# Patient Record
Sex: MALE | Race: WHITE | NOT HISPANIC OR LATINO | Employment: FULL TIME | URBAN - NONMETROPOLITAN AREA
[De-identification: names, ages, dates, MRNs, and addresses within clinical notes are randomized per-mention and may not be internally consistent; named-entity substitution may affect disease eponyms.]

---

## 2017-03-16 ENCOUNTER — CONVERSION ENCOUNTER (OUTPATIENT)
Dept: GASTROENTEROLOGY | Facility: CLINIC | Age: 47
End: 2017-03-16

## 2017-03-17 LAB
ALBUMIN SERPL-MCNC: 4.7 G/DL (ref 3.5–5)
ALBUMIN/GLOB SERPL: 1.7 G/DL (ref 1–2)
ALP SERPL-CCNC: 71 U/L (ref 38–126)
ALT SERPL-CCNC: 51 U/L (ref 13–69)
AMBIG ABBREV CMP14 DEFAULT: NORMAL
AMBIG ABBREV LP DEFAULT: NORMAL
AST SERPL-CCNC: 29 U/L (ref 15–46)
BASOPHILS # BLD AUTO: 0.03 10*3/MM3 (ref 0–0.2)
BASOPHILS NFR BLD AUTO: 0.4 % (ref 0–2.5)
BILIRUB SERPL-MCNC: 0.9 MG/DL (ref 0.2–1.3)
BUN SERPL-MCNC: 16 MG/DL (ref 7–20)
BUN/CREAT SERPL: 14.5 (ref 6.3–21.9)
CALCIUM SERPL-MCNC: 9.7 MG/DL (ref 8.4–10.2)
CHLORIDE SERPL-SCNC: 106 MMOL/L (ref 98–107)
CHOLEST SERPL-MCNC: 243 MG/DL (ref 0–199)
CO2 SERPL-SCNC: 30 MMOL/L (ref 26–30)
CREAT SERPL-MCNC: 1.1 MG/DL (ref 0.6–1.3)
EOSINOPHIL # BLD AUTO: 0.1 10*3/MM3 (ref 0–0.7)
EOSINOPHIL NFR BLD AUTO: 1.3 % (ref 0–7)
ERYTHROCYTE [DISTWIDTH] IN BLOOD BY AUTOMATED COUNT: 12.7 % (ref 11.5–14.5)
GLOBULIN SER CALC-MCNC: 2.7 GM/DL
GLUCOSE SERPL-MCNC: 109 MG/DL (ref 74–98)
HBA1C MFR BLD: 5.7 %
HCT VFR BLD AUTO: 48.3 % (ref 42–52)
HDLC SERPL-MCNC: 40 MG/DL (ref 40–60)
HGB BLD-MCNC: 15.7 G/DL (ref 14–18)
IMM GRANULOCYTES # BLD: 0.03 10*3/MM3 (ref 0–0.06)
IMM GRANULOCYTES NFR BLD: 0.4 % (ref 0–0.6)
LDLC SERPL CALC-MCNC: 178 MG/DL (ref 0–99)
LYMPHOCYTES # BLD AUTO: 1.34 10*3/MM3 (ref 0.6–3.4)
LYMPHOCYTES NFR BLD AUTO: 16.8 % (ref 10–50)
MCH RBC QN AUTO: 29.3 PG (ref 27–31)
MCHC RBC AUTO-ENTMCNC: 32.5 G/DL (ref 30–37)
MCV RBC AUTO: 90.1 FL (ref 80–94)
MONOCYTES # BLD AUTO: 0.9 10*3/MM3 (ref 0–0.9)
MONOCYTES NFR BLD AUTO: 11.3 % (ref 0–12)
NEUTROPHILS # BLD AUTO: 5.57 10*3/MM3 (ref 2–6.9)
NEUTROPHILS NFR BLD AUTO: 69.8 % (ref 37–80)
NRBC BLD AUTO-RTO: 0 /100 WBC (ref 0–0)
PLATELET # BLD AUTO: 218 10*3/MM3 (ref 130–400)
POTASSIUM SERPL-SCNC: 4.8 MMOL/L (ref 3.5–5.1)
PROT SERPL-MCNC: 7.4 G/DL (ref 6.3–8.2)
RBC # BLD AUTO: 5.36 10*6/MM3 (ref 4.7–6.1)
SODIUM SERPL-SCNC: 145 MMOL/L (ref 137–145)
TRIGL SERPL-MCNC: 126 MG/DL
TSH SERPL DL<=0.005 MIU/L-ACNC: 1.63 MIU/ML (ref 0.47–4.68)
VLDLC SERPL CALC-MCNC: 25.2 MG/DL
WBC # BLD AUTO: 7.97 10*3/MM3 (ref 4.8–10.8)

## 2017-03-20 PROBLEM — M26.609 TEMPOROMANDIBULAR JOINT DISORDER: Status: ACTIVE | Noted: 2017-03-20

## 2017-03-22 ENCOUNTER — OFFICE VISIT (OUTPATIENT)
Dept: FAMILY MEDICINE CLINIC | Facility: CLINIC | Age: 47
End: 2017-03-22

## 2017-03-22 VITALS
BODY MASS INDEX: 39.82 KG/M2 | OXYGEN SATURATION: 98 % | RESPIRATION RATE: 17 BRPM | TEMPERATURE: 98 F | HEIGHT: 72 IN | SYSTOLIC BLOOD PRESSURE: 119 MMHG | DIASTOLIC BLOOD PRESSURE: 72 MMHG | HEART RATE: 70 BPM | WEIGHT: 294 LBS

## 2017-03-22 DIAGNOSIS — G47.39 OTHER SLEEP APNEA: ICD-10-CM

## 2017-03-22 DIAGNOSIS — R53.82 CHRONIC FATIGUE: ICD-10-CM

## 2017-03-22 DIAGNOSIS — R73.03 BORDERLINE DIABETES: ICD-10-CM

## 2017-03-22 DIAGNOSIS — G47.09 OTHER INSOMNIA: ICD-10-CM

## 2017-03-22 DIAGNOSIS — F43.9 STRESS AT HOME: Primary | ICD-10-CM

## 2017-03-22 PROCEDURE — 99214 OFFICE O/P EST MOD 30 MIN: CPT | Performed by: INTERNAL MEDICINE

## 2017-03-22 RX ORDER — AMITRIPTYLINE HYDROCHLORIDE 10 MG/1
10 TABLET, FILM COATED ORAL NIGHTLY
Qty: 30 TABLET | Refills: 2 | Status: SHIPPED | OUTPATIENT
Start: 2017-03-22 | End: 2019-09-16

## 2017-03-22 RX ORDER — METFORMIN HYDROCHLORIDE 500 MG/1
TABLET, EXTENDED RELEASE ORAL
COMMUNITY
Start: 2016-02-04 | End: 2017-03-22

## 2017-03-22 NOTE — PROGRESS NOTES
Chief Complaint   Patient presents with   • Establish Care     Patient is here to establish care today.    • Fatigue     Patient states he just has chronic fatigue. When he comes home from work he just wants to crash.    • Weight Gain     Patient states he has an issue losing weight.        Subjective     History of Present Illness   Harish Anaya is a 46 y.o. male with history of borderline DM and obesity who presents to transfer care.  Patient had formerly been following with Dr. Washington and had been started on on metformin after his A1C was noted to be 6.4. He has since stopped metformin and had been controlling diet.     Typical meals: Egg in AM, Salad for lunch, meats for dinner.    He has tried to lose weight by walking regularly but notes that despite walking 3-5 miles per day on a regular basis he is unable to lose weight.  He does express he has stress associated with family life, work at [employer], and as a .      Sleep is a problem. His wife notes that he snores loud.  Patient admits to waking up fatigued.  He also has difficulty falling asleep due to thoughts which wont let him relax.    The following portions of the patient's history were reviewed and updated as appropriate: allergies, current medications, past family history, past medical history, past social history, past surgical history and problem list.    Review of Systems   Constitutional: Positive for fatigue. Negative for chills and fever.   HENT: Negative for congestion, ear pain, rhinorrhea, sinus pressure and sore throat.    Eyes: Negative for visual disturbance.   Respiratory: Negative for cough, chest tightness, shortness of breath and wheezing.    Cardiovascular: Negative for chest pain, palpitations and leg swelling.   Gastrointestinal: Negative for abdominal pain, blood in stool, constipation, diarrhea, nausea and vomiting.   Endocrine: Negative for polydipsia and polyuria.   Genitourinary: Negative for dysuria and hematuria.  "  Musculoskeletal: Negative for back pain.   Skin: Negative for rash.   Neurological: Negative for dizziness, light-headedness, numbness and headaches.   Psychiatric/Behavioral: Positive for sleep disturbance. Negative for dysphoric mood. The patient is not nervous/anxious.        Allergies   Allergen Reactions   • Other        Past Medical History:   Diagnosis Date   • Allergic rhinitis    • BMI 38.0-38.9,adult    • Diverticulosis of colon without hemorrhage    • Dizziness    • Heartburn    • Malaise and fatigue    • Polyuria    • Sore throat        Social History     Social History   • Marital status:      Spouse name: N/A   • Number of children: N/A   • Years of education: N/A     Occupational History   • Not on file.     Social History Main Topics   • Smoking status: Former Smoker   • Smokeless tobacco: Not on file   • Alcohol use No   • Drug use: No   • Sexual activity: Not on file     Other Topics Concern   • Not on file     Social History Narrative   • No narrative on file        Past Surgical History:   Procedure Laterality Date   • APPENDECTOMY     • CHOLECYSTECTOMY     • COLONOSCOPY      complete    • ELBOW PROCEDURE         Family History   Problem Relation Age of Onset   • Depression Mother    • Cancer Maternal Grandmother    • Melanoma Maternal Grandmother    • Parkinsonism Maternal Grandfather    • Diabetes Maternal Aunt    • Alcohol abuse Maternal Uncle    • Liver disease Maternal Uncle          Current Outpatient Prescriptions:   •  amitriptyline (ELAVIL) 10 MG tablet, Take 1 tablet by mouth Every Night., Disp: 30 tablet, Rfl: 2    Objective   /72 (BP Location: Right arm, Patient Position: Sitting, Cuff Size: Adult)  Pulse 70  Temp 98 °F (36.7 °C) (Oral)   Resp 17  Ht 72\" (182.9 cm)  Wt 294 lb (133 kg)  SpO2 98%  BMI 39.87 kg/m2    Physical Exam   Constitutional: He is oriented to person, place, and time. He appears well-nourished. No distress.   HENT:   Head: Atraumatic.   Right " Ear: External ear normal.   Left Ear: External ear normal.   Eyes: Conjunctivae are normal. Right eye exhibits no discharge. Left eye exhibits no discharge.   Cardiovascular: Normal rate and regular rhythm.    No murmur heard.  Pulmonary/Chest: Effort normal and breath sounds normal. He has no wheezes. He has no rales.   Abdominal: Soft. Bowel sounds are normal. He exhibits no distension. There is no tenderness.   Neurological: He is alert and oriented to person, place, and time.   Skin: No rash noted. He is not diaphoretic.   Psychiatric: He has a normal mood and affect.   Nursing note and vitals reviewed.      Assessment/Plan   Harish was seen today for establish care, fatigue and weight gain.    Diagnoses and all orders for this visit:    Stress at home    Other sleep apnea  -     Ambulatory Referral to Sleep Medicine    Chronic fatigue    Other insomnia  -     amitriptyline (ELAVIL) 10 MG tablet; Take 1 tablet by mouth Every Night.    Borderline diabetes          Discussion Summary:  47 yo W M presenting for follow up.    1. Borderline Diabetes  - hold metformin, continue diet control, A1C was 5.5 while off meds recently.      2. Morbid obesity  - diet control seems ineffective per patient  - cardio exercises encouraged  - appears to be associated with stress and CONRAD    3. Sleep apnea  - sleep referral placed, pt to see Dr. Rosales    4. Abnormal Polyps  - Pt to see Dr. Lara for Colonoscopy, needs follow up after 3 years which is this May.    5. Insomnia  - start low dose amitryptiline.    6. HCM - address at later visit.      Follow up:  Return in about 1 month (around 4/22/2017) for Next scheduled follow up.     Patient Instructions:  Patient instructions were provided.

## 2017-03-22 NOTE — PATIENT INSTRUCTIONS
Diabetes Mellitus and Food  It is important for you to manage your blood sugar (glucose) level. Your blood glucose level can be greatly affected by what you eat. Eating healthier foods in the appropriate amounts throughout the day at about the same time each day will help you control your blood glucose level. It can also help slow or prevent worsening of your diabetes mellitus. Healthy eating may even help you improve the level of your blood pressure and reach or maintain a healthy weight.   General recommendations for healthful eating and cooking habits include:  · Eating meals and snacks regularly. Avoid going long periods of time without eating to lose weight.  · Eating a diet that consists mainly of plant-based foods, such as fruits, vegetables, nuts, legumes, and whole grains.  · Using low-heat cooking methods, such as baking, instead of high-heat cooking methods, such as deep frying.  Work with your dietitian to make sure you understand how to use the Nutrition Facts information on food labels.  HOW CAN FOOD AFFECT ME?  Carbohydrates  Carbohydrates affect your blood glucose level more than any other type of food. Your dietitian will help you determine how many carbohydrates to eat at each meal and teach you how to count carbohydrates. Counting carbohydrates is important to keep your blood glucose at a healthy level, especially if you are using insulin or taking certain medicines for diabetes mellitus.  Alcohol  Alcohol can cause sudden decreases in blood glucose (hypoglycemia), especially if you use insulin or take certain medicines for diabetes mellitus. Hypoglycemia can be a life-threatening condition. Symptoms of hypoglycemia (sleepiness, dizziness, and disorientation) are similar to symptoms of having too much alcohol.   If your health care provider has given you approval to drink alcohol, do so in moderation and use the following guidelines:  · Women should not have more than one drink per day, and men  should not have more than two drinks per day. One drink is equal to:    12 oz of beer.    5 oz of wine.    1½ oz of hard liquor.  · Do not drink on an empty stomach.  · Keep yourself hydrated. Have water, diet soda, or unsweetened iced tea.  · Regular soda, juice, and other mixers might contain a lot of carbohydrates and should be counted.  WHAT FOODS ARE NOT RECOMMENDED?  As you make food choices, it is important to remember that all foods are not the same. Some foods have fewer nutrients per serving than other foods, even though they might have the same number of calories or carbohydrates. It is difficult to get your body what it needs when you eat foods with fewer nutrients. Examples of foods that you should avoid that are high in calories and carbohydrates but low in nutrients include:  · Trans fats (most processed foods list trans fats on the Nutrition Facts label).  · Regular soda.  · Juice.  · Candy.  · Sweets, such as cake, pie, doughnuts, and cookies.  · Fried foods.  WHAT FOODS CAN I EAT?  Eat nutrient-rich foods, which will nourish your body and keep you healthy. The food you should eat also will depend on several factors, including:  · The calories you need.  · The medicines you take.  · Your weight.  · Your blood glucose level.  · Your blood pressure level.  · Your cholesterol level.  You should eat a variety of foods, including:  · Protein.    Lean cuts of meat.    Proteins low in saturated fats, such as fish, egg whites, and beans. Avoid processed meats.  · Fruits and vegetables.    Fruits and vegetables that may help control blood glucose levels, such as apples, mangoes, and yams.  · Dairy products.    Choose fat-free or low-fat dairy products, such as milk, yogurt, and cheese.  · Grains, bread, pasta, and rice.    Choose whole grain products, such as multigrain bread, whole oats, and brown rice. These foods may help control blood pressure.  · Fats.    Foods containing healthful fats, such as nuts,  avocado, olive oil, canola oil, and fish.  DOES EVERYONE WITH DIABETES MELLITUS HAVE THE SAME MEAL PLAN?  Because every person with diabetes mellitus is different, there is not one meal plan that works for everyone. It is very important that you meet with a dietitian who will help you create a meal plan that is just right for you.     This information is not intended to replace advice given to you by your health care provider. Make sure you discuss any questions you have with your health care provider.     Document Released: 09/14/2006 Document Revised: 01/08/2016 Document Reviewed: 11/14/2014  [employer] Interactive Patient Education ©2016 [employer]

## 2017-03-23 DIAGNOSIS — E78.49 OTHER HYPERLIPIDEMIA: Primary | ICD-10-CM

## 2017-03-23 RX ORDER — ATORVASTATIN CALCIUM 20 MG/1
20 TABLET, FILM COATED ORAL DAILY
Qty: 30 TABLET | Refills: 3 | Status: SHIPPED | OUTPATIENT
Start: 2017-03-23 | End: 2020-01-14

## 2017-03-24 ENCOUNTER — TELEPHONE (OUTPATIENT)
Dept: FAMILY MEDICINE CLINIC | Facility: CLINIC | Age: 47
End: 2017-03-24

## 2017-03-24 NOTE — TELEPHONE ENCOUNTER
----- Message from Matti Rivera DO sent at 3/23/2017 10:52 PM EDT -----  Contact: SARA MOSQUEDA called it in.  ----- Message -----     From: Delmis Ruffin MA     Sent: 3/23/2017   4:08 PM       To: Matti Rivera DO    WIFE CAME BY OFFICE STATING THAT YOU HAD DISCUSSED PUTTING FRAN ON A LOW DOSE STATIN BUT NOTHING WAS SENT TO THE PHARMACY.

## 2017-11-10 DIAGNOSIS — R10.32 LEFT LOWER QUADRANT PAIN: Primary | ICD-10-CM

## 2017-11-10 RX ORDER — CIPROFLOXACIN 500 MG/1
500 TABLET, FILM COATED ORAL 2 TIMES DAILY
Qty: 14 TABLET | Refills: 0 | Status: SHIPPED | OUTPATIENT
Start: 2017-11-10 | End: 2017-11-17

## 2017-11-10 RX ORDER — METRONIDAZOLE 250 MG/1
250 TABLET ORAL 4 TIMES DAILY
Qty: 28 TABLET | Refills: 0 | Status: SHIPPED | OUTPATIENT
Start: 2017-11-10 | End: 2017-11-17

## 2019-09-16 ENCOUNTER — OFFICE VISIT (OUTPATIENT)
Dept: GASTROENTEROLOGY | Facility: CLINIC | Age: 49
End: 2019-09-16

## 2019-09-16 VITALS
SYSTOLIC BLOOD PRESSURE: 126 MMHG | TEMPERATURE: 98.5 F | WEIGHT: 299 LBS | HEART RATE: 105 BPM | BODY MASS INDEX: 40.5 KG/M2 | RESPIRATION RATE: 18 BRPM | HEIGHT: 72 IN | DIASTOLIC BLOOD PRESSURE: 87 MMHG

## 2019-09-16 DIAGNOSIS — R10.30 LOWER ABDOMINAL PAIN: Primary | ICD-10-CM

## 2019-09-16 DIAGNOSIS — R13.10 DYSPHAGIA, UNSPECIFIED TYPE: ICD-10-CM

## 2019-09-16 DIAGNOSIS — R10.32 LEFT LOWER QUADRANT PAIN: Primary | ICD-10-CM

## 2019-09-16 DIAGNOSIS — R12 HEARTBURN: ICD-10-CM

## 2019-09-16 DIAGNOSIS — K59.00 CONSTIPATION, UNSPECIFIED CONSTIPATION TYPE: ICD-10-CM

## 2019-09-16 PROCEDURE — 99204 OFFICE O/P NEW MOD 45 MIN: CPT | Performed by: INTERNAL MEDICINE

## 2019-09-16 RX ORDER — METRONIDAZOLE 250 MG/1
250 TABLET ORAL 4 TIMES DAILY
Qty: 28 TABLET | Refills: 0 | Status: SHIPPED | OUTPATIENT
Start: 2019-09-16 | End: 2020-01-10

## 2019-09-16 RX ORDER — CIPROFLOXACIN 500 MG/1
500 TABLET, FILM COATED ORAL 2 TIMES DAILY
Qty: 14 TABLET | Refills: 0 | Status: SHIPPED | OUTPATIENT
Start: 2019-09-16 | End: 2020-01-10

## 2019-09-16 NOTE — PROGRESS NOTES
Chief Complaint   Patient presents with   • Abdominal Pain     History of Present Illness     There is history of bilateral lower quadrant abdominal pain for the last 2 days.  The patient started having lower abdominal pain on Saturday night.  The pain is gradual in onset, moderate to severe in severity and aching in character.  Frequency being 2-3 times a day.  The bad pains may last for several minutes.  There is no radiation of abdominal pain.  Eating worsens the abdominal pain.  No definite relieving factors of abdominal pain.  There is associated fever.  Yesterday the patient was running a temperature of 99.5.  However today her temperature was 100.2.  He denies chills.  The patient has been constipated for the last 2 days.  The patient has however been passing flatus.  The patient has history of diverticulitis in the past.  His last colonoscopy was in 2014.  The patient has colon polyps.    The patient has a history of reflux off and on for the last several years.  The reflux is moderately severe.  Symptoms are described as retrosternal burning sensation, and indigestion.  There is history of occasional regurgitative symptoms.  Frequency being several times per week.  The symptoms are worse at night.  Currently the patient is not taking acid suppressive therapy.  The patient has intermittent mild solid food dysphagia at times.  This is not progressive.    The patient denies nausea or vomiting.  There is no history of recent weight loss.  There is no history of overt GI bleed (Hematemesis, melena or hematochezia).  The patient has history of anemia.  There is no history of liver or pancreatic disease.       Review of Systems   Constitutional: Positive for fatigue and fever. Negative for appetite change, chills and unexpected weight change.   HENT: Positive for trouble swallowing. Negative for mouth sores and nosebleeds.    Eyes: Negative for discharge and redness.   Respiratory: Positive for apnea. Negative for  cough and shortness of breath.    Cardiovascular: Negative for chest pain, palpitations and leg swelling.   Gastrointestinal: Positive for abdominal pain and constipation. Negative for abdominal distention, anal bleeding, blood in stool, diarrhea, nausea and vomiting.   Endocrine: Negative for cold intolerance, heat intolerance and polydipsia.   Genitourinary: Negative for dysuria, hematuria and urgency.   Musculoskeletal: Negative for arthralgias, joint swelling and myalgias.   Skin: Negative for rash.   Allergic/Immunologic: Negative for food allergies and immunocompromised state.   Neurological: Positive for weakness. Negative for dizziness, seizures, syncope and headaches.   Hematological: Negative for adenopathy. Does not bruise/bleed easily.   Psychiatric/Behavioral: Negative for dysphoric mood. The patient is not nervous/anxious and is not hyperactive.      Patient Active Problem List   Diagnosis   • Temporomandibular joint disorder   • Borderline diabetes   • Chronic fatigue     Past Medical History:   Diagnosis Date   • Allergic rhinitis    • BMI 38.0-38.9,adult    • Diverticulosis of colon without hemorrhage    • Dizziness    • Heartburn    • Malaise and fatigue    • Polyuria    • Sore throat      Past Surgical History:   Procedure Laterality Date   • APPENDECTOMY     • CHOLECYSTECTOMY     • COLONOSCOPY      complete    • ELBOW PROCEDURE       Family History   Problem Relation Age of Onset   • Depression Mother    • Cancer Maternal Grandmother    • Melanoma Maternal Grandmother    • Parkinsonism Maternal Grandfather    • Diabetes Maternal Aunt    • Alcohol abuse Maternal Uncle    • Liver disease Maternal Uncle      Social History     Tobacco Use   • Smoking status: Former Smoker   Substance Use Topics   • Alcohol use: No       Current Outpatient Medications:   •  atorvastatin (LIPITOR) 20 MG tablet, Take 1 tablet by mouth Daily., Disp: 30 tablet, Rfl: 3  •  ciprofloxacin (CIPRO) 500 MG tablet, Take 1  "tablet by mouth 2 (Two) Times a Day., Disp: 14 tablet, Rfl: 0  •  metroNIDAZOLE (FLAGYL) 250 MG tablet, Take 1 tablet by mouth 4 (Four) Times a Day., Disp: 28 tablet, Rfl: 0  •  Probiotic capsule, Take 1 capsule by mouth Daily., Disp: 30 capsule, Rfl: 1    Allergies   Allergen Reactions   • Other Unknown (See Comments)     NONE REPORTED ON 09.16.19       Blood pressure 126/87, pulse 105, temperature 98.5 °F (36.9 °C), resp. rate 18, height 182.9 cm (72\"), weight 136 kg (299 lb).    Physical Exam   Constitutional: He is oriented to person, place, and time. He appears well-developed and well-nourished. No distress.   HENT:   Head: Normocephalic and atraumatic.   Right Ear: Hearing and external ear normal.   Left Ear: Hearing and external ear normal.   Nose: Nose normal.   Mouth/Throat: Oropharynx is clear and moist and mucous membranes are normal. Mucous membranes are not pale, not dry and not cyanotic. No oral lesions. No oropharyngeal exudate.   Multiple skin tags around neck   Eyes: Conjunctivae and EOM are normal. Right eye exhibits no discharge. Left eye exhibits no discharge. No scleral icterus.   Neck: Trachea normal. Neck supple. No JVD present. No edema present. No thyroid mass and no thyromegaly present.   Cardiovascular: Normal rate, regular rhythm, S2 normal and normal heart sounds. Exam reveals no gallop, no S3 and no friction rub.   No murmur heard.  Pulmonary/Chest: Effort normal and breath sounds normal. No respiratory distress. He has no wheezes. He has no rales. He exhibits no tenderness.   Abdominal: Soft. Normal appearance and bowel sounds are normal. He exhibits no distension, no ascites and no mass. There is no splenomegaly or hepatomegaly. There is tenderness. There is no rigidity, no rebound and no guarding. No hernia.   Musculoskeletal: He exhibits no tenderness or deformity.     Vascular Status -  His right foot exhibits no edema. His left foot exhibits no edema.  Lymphadenopathy:     He has " no cervical adenopathy.        Left: No supraclavicular adenopathy present.   Neurological: He is alert and oriented to person, place, and time. He has normal strength. No cranial nerve deficit or sensory deficit. He exhibits normal muscle tone. Coordination normal.   Skin: No rash noted. He is not diaphoretic. No cyanosis. No pallor. Nails show no clubbing.   Psychiatric: He has a normal mood and affect. His behavior is normal. Judgment and thought content normal.   Nursing note and vitals reviewed.  Stigmata of chronic liver disease:  None.  Asterixis:  None.    Laboratory Testing:  Upon review of medical records:    Dated March 16, 2017 sodium 145 potassium 4.8 chloride 106 CO2 30 BUN 16 serum creatinine 1.10 glucose 109.  Calcium 9.7.  Total protein 7.4.  Albumin 4.70.  T bili 0.9 AST 29 ALT 51 alkaline phosphatase 71.  Total cholesterol 243.  Triglycerides 126.  TSH 1.630.  Hemoglobin A1c 5.70%.  WBC 7.97 hemoglobin 15.7 hematocrit 48.3 MCV 90.1 and platelet count 218.     Procedures:   Upon review of records:     Dated May 14, 2014 the patient underwent a colonoscopy to the terminal ileum which revealed: Left-sided diverticulosis, with an area of significant erythema and swelling in the left colon consistent with underlying diverticulitis, colon polyps, inverted diverticula, rectal nodule, and internal hemorrhoids.  Mucosa was noted to be somewhat flattened within the terminal ileum peer small bowel, terminal ileum, biopsy revealed no pathologic alterations, negative for celiac disease.  Random colon biopsy revealed no significant pathologic alterations.  Sigmoid colon polyp, biopsy revealed sessile serrated adenoma's without high-grade dysplasia (x2).  Sigmoid colon polyp, biopsy revealed inflammatory polyps (x2).  Rectum, biopsy revealed mucosal lymphoid aggregates (x4).    Dated May 14, 2014 the patient underwent an upper endoscopy which revealed: Severe erosive distal esophagitis grade 4, free  "gastroesophageal reflux, distal esophageal diverticulum (small), early stricturing of the distal esophagus, polypoid areas at the cardia, sliding hiatal hernia (less than 3 cm), and erythematous gastritis.  A possible underlying Pierce's cannot be excluded.  No scalloping was seen within the second portion of duodenum.  A significant amount of bile was noted within the stomach and this was suctioned.  Second portion of duodenum, biopsy revealed no pathologic alterations.  Antrum and body, biopsy revealed no pathologic alterations.  No H. pylori identified (negative CFV stain).  Stomach, cardia, biopsy revealed chronic active carditis.  No H. pylori identified (negative CFV stain, adequate control).  Negative for metaplasia, dysplasia, or malignancy.  Mid base, esophagus, left lateral, biopsy revealed reflux esophagitis.  No metaplasia identified (negative AB/PAS stain, adequate control).      Assessment:      ICD-10-CM ICD-9-CM   1. Lower abdominal pain R10.30 789.09   2. Constipation, unspecified constipation type K59.00 564.00   3. Heartburn R12 787.1   4. Dysphagia, unspecified type R13.10 787.20         Discussion:  1.     Plan/  Patient Instructions   1. Clear liquid diet until tomorrow morning.  2. If the patient feels better the diet may then be advanced to low fiber-low-fat diet, otherwise continue with clear liquids for another 24 hours.  3. Once the solid food is resumed eat low fiber diet (avoid fruits, vegetables, cereals, fiber supplements, nuts and seeds) for 5 days thereafter low-fat high-fiber diet.  4. Cipro (ciprofloxacin) 500 mg tablets.  Take 1 tablet twice a day for 7 days.  Side effects were discussed.  5. Flagyl (metronidazole) tablets 250 mg. Take 1 tablet one by mouth 4 times a day for 7 days.  Side effects were discussed.  6. Avoid laxatives, enemas for next 5 days.  However, for constipation the patient may use \"stool softeners\" 1-2 per day.  7. May take probiotics such as Align.  Take " one orally daily while taking antibiotics and 1-2 weeks thereafter.  8. The patient may call back tomorrow and day after tomorrow regarding progress.  9. A possible colonoscopy is recommended in 3 to 4 weeks.  The patient has history of colon polyps.  His last colonoscopy was in 2014.  10. The patient may take Tylenol 500 to 650 mg orally 4 times a day for fever.  11. Should the symptoms get worse the patient has been advised to call back or to seek medical help in the emergency room.  12. Recommend acid suppressive therapy.           Ruddy Lara MD

## 2019-09-16 NOTE — PATIENT INSTRUCTIONS
"1. Clear liquid diet until tomorrow morning.  2. If the patient feels better the diet may then be advanced to low fiber-low-fat diet, otherwise continue with clear liquids for another 24 hours.  3. Once the solid food is resumed eat low fiber diet (avoid fruits, vegetables, cereals, fiber supplements, nuts and seeds) for 5 days thereafter low-fat high-fiber diet.  4. Cipro (ciprofloxacin) 500 mg tablets.  Take 1 tablet twice a day for 7 days.  Side effects were discussed.  5. Flagyl (metronidazole) tablets 250 mg. Take 1 tablet one by mouth 4 times a day for 7 days.  Side effects were discussed.  6. Avoid laxatives, enemas for next 5 days.  However, for constipation the patient may use \"stool softeners\" 1-2 per day.  7. May take probiotics such as Align.  Take one orally daily while taking antibiotics and 1-2 weeks thereafter.  8. The patient may call back tomorrow and day after tomorrow regarding progress.  9. A possible colonoscopy is recommended in 3 to 4 weeks.  The patient has history of colon polyps.  His last colonoscopy was in 2014.  10. The patient may take Tylenol 500 to 650 mg orally 4 times a day for fever.  11. Should the symptoms get worse the patient has been advised to call back or to seek medical help in the emergency room.  12. Recommend acid suppressive therapy.      "

## 2019-12-19 ENCOUNTER — PREP FOR SURGERY (OUTPATIENT)
Dept: OTHER | Facility: HOSPITAL | Age: 49
End: 2019-12-19

## 2019-12-19 DIAGNOSIS — K59.00 CONSTIPATION, UNSPECIFIED CONSTIPATION TYPE: ICD-10-CM

## 2019-12-19 DIAGNOSIS — Z86.010 HISTORY OF COLON POLYPS: ICD-10-CM

## 2019-12-19 DIAGNOSIS — Z12.11 COLON CANCER SCREENING: ICD-10-CM

## 2019-12-19 DIAGNOSIS — R10.30 LOWER ABDOMINAL PAIN: Primary | ICD-10-CM

## 2019-12-19 DIAGNOSIS — R10.32 LEFT LOWER QUADRANT PAIN: ICD-10-CM

## 2019-12-19 DIAGNOSIS — K62.89 RECTAL NODULE: ICD-10-CM

## 2019-12-19 RX ORDER — SODIUM CHLORIDE 9 MG/ML
70 INJECTION, SOLUTION INTRAVENOUS CONTINUOUS PRN
Status: CANCELLED | OUTPATIENT
Start: 2020-01-17

## 2019-12-27 PROBLEM — Z86.012 HISTORY OF BENIGN CARCINOID TUMOR: Status: ACTIVE | Noted: 2019-12-27

## 2019-12-27 PROBLEM — R10.32 LEFT LOWER QUADRANT PAIN: Status: ACTIVE | Noted: 2019-12-27

## 2019-12-27 PROBLEM — K59.00 CONSTIPATION: Status: ACTIVE | Noted: 2019-12-27

## 2019-12-27 PROBLEM — R10.30 LOWER ABDOMINAL PAIN: Status: ACTIVE | Noted: 2019-12-27

## 2019-12-27 PROBLEM — Z12.11 COLON CANCER SCREENING: Status: ACTIVE | Noted: 2019-12-27

## 2020-01-10 ENCOUNTER — HOSPITAL ENCOUNTER (OUTPATIENT)
Dept: GENERAL RADIOLOGY | Facility: HOSPITAL | Age: 50
Discharge: HOME | End: 2020-01-10
Admitting: INTERNAL MEDICINE

## 2020-01-10 ENCOUNTER — OFFICE VISIT (OUTPATIENT)
Dept: INTERNAL MEDICINE | Facility: CLINIC | Age: 50
End: 2020-01-10

## 2020-01-10 VITALS
DIASTOLIC BLOOD PRESSURE: 75 MMHG | BODY MASS INDEX: 42.26 KG/M2 | SYSTOLIC BLOOD PRESSURE: 124 MMHG | OXYGEN SATURATION: 99 % | HEIGHT: 72 IN | HEART RATE: 65 BPM | RESPIRATION RATE: 18 BRPM | WEIGHT: 312 LBS | TEMPERATURE: 97.7 F

## 2020-01-10 DIAGNOSIS — G89.29 CHRONIC PAIN OF RIGHT KNEE: ICD-10-CM

## 2020-01-10 DIAGNOSIS — R53.82 CHRONIC FATIGUE: ICD-10-CM

## 2020-01-10 DIAGNOSIS — M79.10 MUSCLE PAIN: ICD-10-CM

## 2020-01-10 DIAGNOSIS — G47.39 OTHER SLEEP APNEA: ICD-10-CM

## 2020-01-10 DIAGNOSIS — M25.561 CHRONIC PAIN OF RIGHT KNEE: ICD-10-CM

## 2020-01-10 DIAGNOSIS — G47.09 OTHER INSOMNIA: ICD-10-CM

## 2020-01-10 DIAGNOSIS — N52.9 ERECTILE DYSFUNCTION, UNSPECIFIED ERECTILE DYSFUNCTION TYPE: ICD-10-CM

## 2020-01-10 DIAGNOSIS — R73.03 BORDERLINE DIABETES: ICD-10-CM

## 2020-01-10 DIAGNOSIS — R73.03 PREDIABETES: Primary | ICD-10-CM

## 2020-01-10 LAB — HBA1C MFR BLD: 5.8 %

## 2020-01-10 PROCEDURE — 73560 X-RAY EXAM OF KNEE 1 OR 2: CPT

## 2020-01-10 PROCEDURE — 99396 PREV VISIT EST AGE 40-64: CPT | Performed by: INTERNAL MEDICINE

## 2020-01-10 PROCEDURE — 83036 HEMOGLOBIN GLYCOSYLATED A1C: CPT | Performed by: INTERNAL MEDICINE

## 2020-01-10 PROCEDURE — 90715 TDAP VACCINE 7 YRS/> IM: CPT | Performed by: INTERNAL MEDICINE

## 2020-01-10 PROCEDURE — 90471 IMMUNIZATION ADMIN: CPT | Performed by: INTERNAL MEDICINE

## 2020-01-10 RX ORDER — AMITRIPTYLINE HYDROCHLORIDE 10 MG/1
10 TABLET, FILM COATED ORAL NIGHTLY PRN
Qty: 30 TABLET | Refills: 2 | Status: SHIPPED | OUTPATIENT
Start: 2020-01-10 | End: 2020-03-03 | Stop reason: SDUPTHER

## 2020-01-10 NOTE — PATIENT INSTRUCTIONS
Health Maintenance, Male  A healthy lifestyle and preventive care is important for your health and wellness. Ask your health care provider about what schedule of regular examinations is right for you.  What should I know about weight and diet?  Eat a Healthy Diet  · Eat plenty of vegetables, fruits, whole grains, low-fat dairy products, and lean protein.  · Do not eat a lot of foods high in solid fats, added sugars, or salt.    Maintain a Healthy Weight  Regular exercise can help you achieve or maintain a healthy weight. You should:  · Do at least 150 minutes of exercise each week. The exercise should increase your heart rate and make you sweat (moderate-intensity exercise).  · Do strength-training exercises at least twice a week.  Watch Your Levels of Cholesterol and Blood Lipids  · Have your blood tested for lipids and cholesterol every 5 years starting at 35 years of age. If you are at high risk for heart disease, you should start having your blood tested when you are 20 years old. You may need to have your cholesterol levels checked more often if:  ? Your lipid or cholesterol levels are high.  ? You are older than 50 years of age.  ? You are at high risk for heart disease.  What should I know about cancer screening?  Many types of cancers can be detected early and may often be prevented.  Lung Cancer  · You should be screened every year for lung cancer if:  ? You are a current smoker who has smoked for at least 30 years.  ? You are a former smoker who has quit within the past 15 years.  · Talk to your health care provider about your screening options, when you should start screening, and how often you should be screened.  Colorectal Cancer  · Routine colorectal cancer screening usually begins at 50 years of age and should be repeated every 5-10 years until you are 75 years old. You may need to be screened more often if early forms of precancerous polyps or small growths are found. Your health care provider may  recommend screening at an earlier age if you have risk factors for colon cancer.  · Your health care provider may recommend using home test kits to check for hidden blood in the stool.  · A small camera at the end of a tube can be used to examine your colon (sigmoidoscopy or colonoscopy). This checks for the earliest forms of colorectal cancer.  Prostate and Testicular Cancer  · Depending on your age and overall health, your health care provider may do certain tests to screen for prostate and testicular cancer.  · Talk to your health care provider about any symptoms or concerns you have about testicular or prostate cancer.  Skin Cancer  · Check your skin from head to toe regularly.  · Tell your health care provider about any new moles or changes in moles, especially if:  ? There is a change in a mole’s size, shape, or color.  ? You have a mole that is larger than a pencil eraser.  · Always use sunscreen. Apply sunscreen liberally and repeat throughout the day.  · Protect yourself by wearing long sleeves, pants, a wide-brimmed hat, and sunglasses when outside.  What should I know about heart disease, diabetes, and high blood pressure?  · If you are 18-39 years of age, have your blood pressure checked every 3-5 years. If you are 40 years of age or older, have your blood pressure checked every year. You should have your blood pressure measured twice--once when you are at a hospital or clinic, and once when you are not at a hospital or clinic. Record the average of the two measurements. To check your blood pressure when you are not at a hospital or clinic, you can use:  ? An automated blood pressure machine at a pharmacy.  ? A home blood pressure monitor.  · Talk to your health care provider about your target blood pressure.  · If you are between 45-79 years old, ask your health care provider if you should take aspirin to prevent heart disease.  · Have regular diabetes screenings by checking your fasting blood sugar  level.  ? If you are at a normal weight and have a low risk for diabetes, have this test once every three years after the age of 45.  ? If you are overweight and have a high risk for diabetes, consider being tested at a younger age or more often.  · A one-time screening for abdominal aortic aneurysm (AAA) by ultrasound is recommended for men aged 65-75 years who are current or former smokers.  What should I know about preventing infection?  Hepatitis B  If you have a higher risk for hepatitis B, you should be screened for this virus. Talk with your health care provider to find out if you are at risk for hepatitis B infection.  Hepatitis C  Blood testing is recommended for:  · Everyone born from 1945 through 1965.  · Anyone with known risk factors for hepatitis C.  Sexually Transmitted Diseases (STDs)  · You should be screened each year for STDs including gonorrhea and chlamydia if:  ? You are sexually active and are younger than 24 years of age.  ? You are older than 24 years of age and your health care provider tells you that you are at risk for this type of infection.  ? Your sexual activity has changed since you were last screened and you are at an increased risk for chlamydia or gonorrhea. Ask your health care provider if you are at risk.  · Talk with your health care provider about whether you are at high risk of being infected with HIV. Your health care provider may recommend a prescription medicine to help prevent HIV infection.  What else can I do?  · Schedule regular health, dental, and eye exams.  · Stay current with your vaccines (immunizations).  · Do not use any tobacco products, such as cigarettes, chewing tobacco, and e-cigarettes. If you need help quitting, ask your health care provider.  · Limit alcohol intake to no more than 2 drinks per day. One drink equals 12 ounces of beer, 5 ounces of wine, or 1½ ounces of hard liquor.  · Do not use street drugs.  · Do not share needles.  · Ask your health  care provider for help if you need support or information about quitting drugs.  · Tell your health care provider if you often feel depressed.  · Tell your health care provider if you have ever been abused or do not feel safe at home.  This information is not intended to replace advice given to you by your health care provider. Make sure you discuss any questions you have with your health care provider.  Document Released: 06/15/2009 Document Revised: 08/16/2017 Document Reviewed: 09/20/2016  [employer] Interactive Patient Education © 2019 [employer]

## 2020-01-10 NOTE — PROGRESS NOTES
Chief Complaint   Patient presents with   • Annual Exam     has concern for muscle weakness/pain in legs-mainly, knee pain, check sugar-Colonoscopy scheduled next week       Subjective     History of Present Illness   Harish Anaya is a 49 y.o. male presenting for annual physical.  Preventive health maintenance was reviewed and discussed today. Vaccines were updated.     Has been having muscle pains and calf pains with a sensation that his muscles are weak.  Has had moments where he feels weak in the legs.  Almost fell off stairs once.  Admits to having right knee troubles as a result of avoiding pressure on left.     Has colonoscopy planned for next week.     Has been having difficulty with falling asleep, did well with amitriptyline in the past.  Wishes to restart the medications.  Did not establish with sleep medicine for CONRAD noted a couple of years ago.     The following portions of the patient's history were reviewed and updated as appropriate: allergies, current medications, past family history, past medical history, past social history, past surgical history and problem list.    Review of Systems   Constitutional: Negative for chills, fatigue and fever.   HENT: Negative for congestion, ear pain, rhinorrhea, sinus pressure and sore throat.    Eyes: Negative for visual disturbance.   Respiratory: Negative for cough, chest tightness, shortness of breath and wheezing.    Cardiovascular: Negative for chest pain, palpitations and leg swelling.   Gastrointestinal: Negative for abdominal pain, blood in stool, constipation, diarrhea, nausea and vomiting.   Endocrine: Negative for polydipsia and polyuria.   Genitourinary: Negative for dysuria and hematuria.   Musculoskeletal: Negative for arthralgias and back pain.   Skin: Negative for rash.   Neurological: Negative for dizziness, light-headedness, numbness and headaches.   Psychiatric/Behavioral: Negative for dysphoric mood and sleep disturbance. The patient  "is not nervous/anxious.        No Known Allergies    Past Medical History:   Diagnosis Date   • Allergic rhinitis    • BMI 38.0-38.9,adult    • Diabetes mellitus (CMS/HCC)     borderline   • Diverticulosis of colon without hemorrhage    • Dizziness    • Heartburn    • Hyperlipidemia    • Malaise and fatigue    • Polyuria    • PONV (postoperative nausea and vomiting)    • Sore throat        Social History     Socioeconomic History   • Marital status:      Spouse name: Not on file   • Number of children: Not on file   • Years of education: Not on file   • Highest education level: Not on file   Tobacco Use   • Smoking status: Former Smoker   Substance and Sexual Activity   • Alcohol use: No   • Drug use: No   • Sexual activity: Defer        Past Surgical History:   Procedure Laterality Date   • APPENDECTOMY     • CHOLECYSTECTOMY     • COLONOSCOPY      complete    • ELBOW PROCEDURE         Family History   Problem Relation Age of Onset   • Depression Mother    • Cancer Maternal Grandmother    • Melanoma Maternal Grandmother    • Parkinsonism Maternal Grandfather    • Diabetes Maternal Aunt    • Alcohol abuse Maternal Uncle    • Liver disease Maternal Uncle          Current Outpatient Medications:   •  bisacodyl (DULCOLAX) 5 MG EC tablet, Take As Directed., Disp: 5 tablet, Rfl: 0  •  magnesium citrate 1.745 GM/30ML solution solution, Take As Directed., Disp: 592 mL, Rfl: 0  •  metoclopramide (REGLAN) 5 MG tablet, Take As Directed., Disp: 2 tablet, Rfl: 0  •  Probiotic capsule, Take 1 capsule by mouth Daily., Disp: 30 capsule, Rfl: 1  •  amitriptyline (ELAVIL) 10 MG tablet, Take 1 tablet by mouth At Night As Needed for Sleep., Disp: 30 tablet, Rfl: 2  •  atorvastatin (LIPITOR) 40 MG tablet, Take 1 tablet by mouth Every Night., Disp: 30 tablet, Rfl: 2    Objective   /75   Pulse 65   Temp 97.7 °F (36.5 °C)   Resp 18   Ht 182.9 cm (72\")   Wt (!) 142 kg (312 lb)   SpO2 99%   BMI 42.31 kg/m²     Physical " Exam   Constitutional: He is oriented to person, place, and time. He appears well-developed and well-nourished.   HENT:   Head: Normocephalic and atraumatic.   Right Ear: External ear normal.   Left Ear: External ear normal.   Nose: Nose normal.   Mouth/Throat: Oropharynx is clear and moist. No oropharyngeal exudate.   Eyes: Pupils are equal, round, and reactive to light. EOM are normal. Right eye exhibits no discharge. No scleral icterus.   Neck: Normal range of motion. Neck supple. No JVD present. No thyromegaly present.   Cardiovascular: Normal rate, regular rhythm, normal heart sounds and intact distal pulses. Exam reveals no friction rub.   No murmur heard.  Pulmonary/Chest: Effort normal and breath sounds normal. No stridor. No respiratory distress. He has no wheezes.   Abdominal: Soft. Bowel sounds are normal. He exhibits no distension. There is no tenderness. There is no guarding.   Genitourinary:   Genitourinary Comments: Deferred   Musculoskeletal: Normal range of motion. He exhibits no edema or tenderness.   Lymphadenopathy:     He has no cervical adenopathy.   Neurological: He is alert and oriented to person, place, and time. No cranial nerve deficit.   Skin: Skin is warm and dry. No rash noted.   Psychiatric: He has a normal mood and affect. His behavior is normal. Judgment and thought content normal.   Nursing note and vitals reviewed.      Assessment/Plan   Harish was seen today for annual exam.    Diagnoses and all orders for this visit:    Prediabetes  -     POC Glycosylated Hemoglobin (Hb A1C)    Other insomnia  -     amitriptyline (ELAVIL) 10 MG tablet; Take 1 tablet by mouth At Night As Needed for Sleep.    Other sleep apnea  -     Ambulatory Referral to Sleep Medicine    Chronic fatigue  -     CBC & Differential  -     Comprehensive Metabolic Panel  -     Lipid Panel  -     Vitamin B12  -     TSH  -     Vitamin D 25 Hydroxy  -     CK  -     Testosterone, Free, Total  -     CBC Auto  Differential    Borderline diabetes  -     CBC & Differential  -     Comprehensive Metabolic Panel  -     Lipid Panel  -     Vitamin B12  -     TSH  -     Vitamin D 25 Hydroxy  -     CK  -     CBC Auto Differential    Muscle pain  -     CBC & Differential  -     Comprehensive Metabolic Panel  -     Lipid Panel  -     Vitamin B12  -     TSH  -     Vitamin D 25 Hydroxy  -     CK  -     CBC Auto Differential    Erectile dysfunction, unspecified erectile dysfunction type  -     Testosterone, Free, Total    Chronic pain of right knee  -     XR Knee 1 or 2 View Right; Future    Other orders  -     Tdap Vaccine Greater Than or Equal To 6yo IM          Discussion Summary:  Patient is a 49 y.o. male presenting for annual physical    1. Preventive Health Maintenance  - Baseline labs are up-to-date or ordered per above.  - Vaccines reviewed and updated  - Preventive health measures were discussed including: healthy diet with increase in fruits and vegetables, regular exercise at least 3 times a week, safe sex practices, avoidance of drugs, tobacco, and alcohol, and regular seatbelt use.    2. Borderline Diabetes  - check labs  - diet control reviewed.     3. Obesity  - Weight loss options were discussed in detail including reducing fried, fatty, and sugary foods with diet control. A regular exercise regimen was discussed.  Patient's Body mass index is 42.31 kg/m². BMI is above normal parameters. Recommendations include: educational material and exercise counseling.    4. Right knee pain  - eval with knee xray.       5. Insomnia  - refilled amitryptiline, was working well for him.           Follow up:  Return in about 6 weeks (around 2/21/2020) for Next scheduled follow up.     Patient Instructions:  Patient instructions were provided.

## 2020-01-11 ENCOUNTER — APPOINTMENT (OUTPATIENT)
Dept: LAB | Facility: HOSPITAL | Age: 50
End: 2020-01-11

## 2020-01-11 LAB
25(OH)D3 SERPL-MCNC: 26.1 NG/ML (ref 30–100)
ALBUMIN SERPL-MCNC: 4.6 G/DL (ref 3.5–5.2)
ALBUMIN/GLOB SERPL: 1.8 G/DL
ALP SERPL-CCNC: 68 U/L (ref 39–117)
ALT SERPL W P-5'-P-CCNC: 30 U/L (ref 1–41)
ANION GAP SERPL CALCULATED.3IONS-SCNC: 12.2 MMOL/L (ref 5–15)
AST SERPL-CCNC: 18 U/L (ref 1–40)
BASOPHILS # BLD AUTO: 0.03 10*3/MM3 (ref 0–0.2)
BASOPHILS NFR BLD AUTO: 0.5 % (ref 0–1.5)
BILIRUB SERPL-MCNC: 0.3 MG/DL (ref 0.2–1.2)
BUN BLD-MCNC: 11 MG/DL (ref 6–20)
BUN/CREAT SERPL: 11.6 (ref 7–25)
CALCIUM SPEC-SCNC: 9.1 MG/DL (ref 8.6–10.5)
CHLORIDE SERPL-SCNC: 104 MMOL/L (ref 98–107)
CHOLEST SERPL-MCNC: 225 MG/DL (ref 0–200)
CK SERPL-CCNC: 114 U/L (ref 20–200)
CO2 SERPL-SCNC: 25.8 MMOL/L (ref 22–29)
CREAT BLD-MCNC: 0.95 MG/DL (ref 0.76–1.27)
DEPRECATED RDW RBC AUTO: 41.6 FL (ref 37–54)
EOSINOPHIL # BLD AUTO: 0.08 10*3/MM3 (ref 0–0.4)
EOSINOPHIL NFR BLD AUTO: 1.2 % (ref 0.3–6.2)
ERYTHROCYTE [DISTWIDTH] IN BLOOD BY AUTOMATED COUNT: 12.6 % (ref 12.3–15.4)
GFR SERPL CREATININE-BSD FRML MDRD: 85 ML/MIN/1.73
GLOBULIN UR ELPH-MCNC: 2.5 GM/DL
GLUCOSE BLD-MCNC: 120 MG/DL (ref 65–99)
HCT VFR BLD AUTO: 44 % (ref 37.5–51)
HDLC SERPL-MCNC: 32 MG/DL (ref 40–60)
HGB BLD-MCNC: 14.7 G/DL (ref 13–17.7)
IMM GRANULOCYTES # BLD AUTO: 0.02 10*3/MM3 (ref 0–0.05)
IMM GRANULOCYTES NFR BLD AUTO: 0.3 % (ref 0–0.5)
LDLC SERPL CALC-MCNC: 174 MG/DL (ref 0–100)
LDLC/HDLC SERPL: 5.44 {RATIO}
LYMPHOCYTES # BLD AUTO: 1.51 10*3/MM3 (ref 0.7–3.1)
LYMPHOCYTES NFR BLD AUTO: 23.1 % (ref 19.6–45.3)
MCH RBC QN AUTO: 30.1 PG (ref 26.6–33)
MCHC RBC AUTO-ENTMCNC: 33.4 G/DL (ref 31.5–35.7)
MCV RBC AUTO: 90.2 FL (ref 79–97)
MONOCYTES # BLD AUTO: 0.67 10*3/MM3 (ref 0.1–0.9)
MONOCYTES NFR BLD AUTO: 10.3 % (ref 5–12)
NEUTROPHILS # BLD AUTO: 4.22 10*3/MM3 (ref 1.7–7)
NEUTROPHILS NFR BLD AUTO: 64.6 % (ref 42.7–76)
NRBC BLD AUTO-RTO: 0 /100 WBC (ref 0–0.2)
PLATELET # BLD AUTO: 226 10*3/MM3 (ref 140–450)
PMV BLD AUTO: 12.1 FL (ref 6–12)
POTASSIUM BLD-SCNC: 4.6 MMOL/L (ref 3.5–5.2)
PROT SERPL-MCNC: 7.1 G/DL (ref 6–8.5)
RBC # BLD AUTO: 4.88 10*6/MM3 (ref 4.14–5.8)
SODIUM BLD-SCNC: 142 MMOL/L (ref 136–145)
TRIGL SERPL-MCNC: 95 MG/DL (ref 0–150)
TSH SERPL DL<=0.05 MIU/L-ACNC: 1.4 UIU/ML (ref 0.27–4.2)
VIT B12 BLD-MCNC: 283 PG/ML (ref 211–946)
VLDLC SERPL-MCNC: 19 MG/DL (ref 5–40)
WBC NRBC COR # BLD: 6.53 10*3/MM3 (ref 3.4–10.8)

## 2020-01-11 PROCEDURE — 80061 LIPID PANEL: CPT | Performed by: INTERNAL MEDICINE

## 2020-01-11 PROCEDURE — 36415 COLL VENOUS BLD VENIPUNCTURE: CPT | Performed by: INTERNAL MEDICINE

## 2020-01-11 PROCEDURE — 84403 ASSAY OF TOTAL TESTOSTERONE: CPT | Performed by: INTERNAL MEDICINE

## 2020-01-11 PROCEDURE — 84402 ASSAY OF FREE TESTOSTERONE: CPT | Performed by: INTERNAL MEDICINE

## 2020-01-11 PROCEDURE — 82306 VITAMIN D 25 HYDROXY: CPT | Performed by: INTERNAL MEDICINE

## 2020-01-11 PROCEDURE — 85025 COMPLETE CBC W/AUTO DIFF WBC: CPT | Performed by: INTERNAL MEDICINE

## 2020-01-11 PROCEDURE — 84443 ASSAY THYROID STIM HORMONE: CPT | Performed by: INTERNAL MEDICINE

## 2020-01-11 PROCEDURE — 82607 VITAMIN B-12: CPT | Performed by: INTERNAL MEDICINE

## 2020-01-11 PROCEDURE — 82550 ASSAY OF CK (CPK): CPT | Performed by: INTERNAL MEDICINE

## 2020-01-11 PROCEDURE — 80053 COMPREHEN METABOLIC PANEL: CPT | Performed by: INTERNAL MEDICINE

## 2020-01-13 LAB
TESTOST FREE SERPL-MCNC: 4 PG/ML (ref 6.8–21.5)
TESTOST SERPL-MCNC: 59 NG/DL (ref 264–916)

## 2020-01-14 DIAGNOSIS — R79.89 LOW TESTOSTERONE IN MALE: ICD-10-CM

## 2020-01-14 DIAGNOSIS — E78.5 HYPERLIPIDEMIA, UNSPECIFIED HYPERLIPIDEMIA TYPE: Primary | ICD-10-CM

## 2020-01-14 RX ORDER — ATORVASTATIN CALCIUM 40 MG/1
40 TABLET, FILM COATED ORAL NIGHTLY
Qty: 30 TABLET | Refills: 2 | Status: SHIPPED | OUTPATIENT
Start: 2020-01-14 | End: 2020-03-03 | Stop reason: SDUPTHER

## 2020-01-14 NOTE — PROGRESS NOTES
Testosterone was very low and cholesterol is very high again as compared to last year.  I would recommend work up with urologist.  I also want him to start a statin medication for high cholesterol.  It will be sent in to his pharmacy.

## 2020-01-17 ENCOUNTER — ANESTHESIA (OUTPATIENT)
Dept: GASTROENTEROLOGY | Facility: HOSPITAL | Age: 50
End: 2020-01-17

## 2020-01-17 ENCOUNTER — ANESTHESIA EVENT (OUTPATIENT)
Dept: GASTROENTEROLOGY | Facility: HOSPITAL | Age: 50
End: 2020-01-17

## 2020-01-17 ENCOUNTER — HOSPITAL ENCOUNTER (OUTPATIENT)
Facility: HOSPITAL | Age: 50
Setting detail: HOSPITAL OUTPATIENT SURGERY
Discharge: HOME | End: 2020-01-17
Attending: INTERNAL MEDICINE | Admitting: INTERNAL MEDICINE

## 2020-01-17 VITALS
BODY MASS INDEX: 41.63 KG/M2 | HEIGHT: 72 IN | SYSTOLIC BLOOD PRESSURE: 104 MMHG | RESPIRATION RATE: 16 BRPM | OXYGEN SATURATION: 97 % | HEART RATE: 60 BPM | TEMPERATURE: 98.6 F | DIASTOLIC BLOOD PRESSURE: 83 MMHG | WEIGHT: 307.38 LBS

## 2020-01-17 DIAGNOSIS — Z12.11 COLON CANCER SCREENING: ICD-10-CM

## 2020-01-17 DIAGNOSIS — R10.32 LEFT LOWER QUADRANT PAIN: ICD-10-CM

## 2020-01-17 DIAGNOSIS — K59.00 CONSTIPATION, UNSPECIFIED CONSTIPATION TYPE: ICD-10-CM

## 2020-01-17 DIAGNOSIS — R10.30 LOWER ABDOMINAL PAIN: ICD-10-CM

## 2020-01-17 DIAGNOSIS — Z86.012 HISTORY OF BENIGN CARCINOID TUMOR: ICD-10-CM

## 2020-01-17 PROCEDURE — 25010000002 FENTANYL CITRATE (PF) 100 MCG/2ML SOLUTION: Performed by: NURSE ANESTHETIST, CERTIFIED REGISTERED

## 2020-01-17 PROCEDURE — 45385 COLONOSCOPY W/LESION REMOVAL: CPT | Performed by: INTERNAL MEDICINE

## 2020-01-17 PROCEDURE — 25010000002 MIDAZOLAM PER 1MG: Performed by: NURSE ANESTHETIST, CERTIFIED REGISTERED

## 2020-01-17 PROCEDURE — 25010000002 PROPOFOL 10 MG/ML EMULSION: Performed by: NURSE ANESTHETIST, CERTIFIED REGISTERED

## 2020-01-17 PROCEDURE — 25010000002 ONDANSETRON PER 1 MG: Performed by: NURSE ANESTHETIST, CERTIFIED REGISTERED

## 2020-01-17 PROCEDURE — S0260 H&P FOR SURGERY: HCPCS | Performed by: INTERNAL MEDICINE

## 2020-01-17 PROCEDURE — 45380 COLONOSCOPY AND BIOPSY: CPT | Performed by: INTERNAL MEDICINE

## 2020-01-17 RX ORDER — SIMETHICONE 20 MG/.3ML
EMULSION ORAL AS NEEDED
Status: DISCONTINUED | OUTPATIENT
Start: 2020-01-17 | End: 2020-01-17 | Stop reason: HOSPADM

## 2020-01-17 RX ORDER — ONDANSETRON 2 MG/ML
INJECTION INTRAMUSCULAR; INTRAVENOUS AS NEEDED
Status: DISCONTINUED | OUTPATIENT
Start: 2020-01-17 | End: 2020-01-17 | Stop reason: SURG

## 2020-01-17 RX ORDER — LIDOCAINE 50 MG/G
OINTMENT TOPICAL AS NEEDED
Status: DISCONTINUED | OUTPATIENT
Start: 2020-01-17 | End: 2020-01-17 | Stop reason: HOSPADM

## 2020-01-17 RX ORDER — FENTANYL CITRATE 50 UG/ML
INJECTION, SOLUTION INTRAMUSCULAR; INTRAVENOUS AS NEEDED
Status: DISCONTINUED | OUTPATIENT
Start: 2020-01-17 | End: 2020-01-17 | Stop reason: SURG

## 2020-01-17 RX ORDER — SODIUM CHLORIDE 9 MG/ML
70 INJECTION, SOLUTION INTRAVENOUS CONTINUOUS PRN
Status: DISCONTINUED | OUTPATIENT
Start: 2020-01-17 | End: 2020-01-17 | Stop reason: HOSPADM

## 2020-01-17 RX ORDER — PROPOFOL 10 MG/ML
VIAL (ML) INTRAVENOUS AS NEEDED
Status: DISCONTINUED | OUTPATIENT
Start: 2020-01-17 | End: 2020-01-17 | Stop reason: SURG

## 2020-01-17 RX ORDER — KETAMINE HCL IN NACL, ISO-OSM 100MG/10ML
SYRINGE (ML) INJECTION AS NEEDED
Status: DISCONTINUED | OUTPATIENT
Start: 2020-01-17 | End: 2020-01-17 | Stop reason: SURG

## 2020-01-17 RX ORDER — MIDAZOLAM HYDROCHLORIDE 2 MG/2ML
INJECTION, SOLUTION INTRAMUSCULAR; INTRAVENOUS AS NEEDED
Status: DISCONTINUED | OUTPATIENT
Start: 2020-01-17 | End: 2020-01-17 | Stop reason: SURG

## 2020-01-17 RX ADMIN — PROPOFOL 20 MG: 10 INJECTION, EMULSION INTRAVENOUS at 09:22

## 2020-01-17 RX ADMIN — FENTANYL CITRATE 50 MCG: 50 INJECTION, SOLUTION INTRAMUSCULAR; INTRAVENOUS at 09:16

## 2020-01-17 RX ADMIN — PROPOFOL 20 MG: 10 INJECTION, EMULSION INTRAVENOUS at 09:24

## 2020-01-17 RX ADMIN — PROPOFOL 20 MG: 10 INJECTION, EMULSION INTRAVENOUS at 09:19

## 2020-01-17 RX ADMIN — PROPOFOL 20 MG: 10 INJECTION, EMULSION INTRAVENOUS at 09:20

## 2020-01-17 RX ADMIN — SODIUM CHLORIDE 70 ML/HR: 9 INJECTION, SOLUTION INTRAVENOUS at 08:30

## 2020-01-17 RX ADMIN — PROPOFOL 20 MG: 10 INJECTION, EMULSION INTRAVENOUS at 09:28

## 2020-01-17 RX ADMIN — Medication 25 MG: at 09:16

## 2020-01-17 RX ADMIN — PROPOFOL 20 MG: 10 INJECTION, EMULSION INTRAVENOUS at 09:44

## 2020-01-17 RX ADMIN — MIDAZOLAM HYDROCHLORIDE 1 MG: 1 INJECTION, SOLUTION INTRAMUSCULAR; INTRAVENOUS at 09:16

## 2020-01-17 RX ADMIN — FENTANYL CITRATE 50 MCG: 50 INJECTION, SOLUTION INTRAMUSCULAR; INTRAVENOUS at 09:17

## 2020-01-17 RX ADMIN — PROPOFOL 20 MG: 10 INJECTION, EMULSION INTRAVENOUS at 09:18

## 2020-01-17 RX ADMIN — Medication 25 MG: at 09:20

## 2020-01-17 RX ADMIN — PROPOFOL 20 MG: 10 INJECTION, EMULSION INTRAVENOUS at 09:41

## 2020-01-17 RX ADMIN — PROPOFOL 20 MG: 10 INJECTION, EMULSION INTRAVENOUS at 09:34

## 2020-01-17 RX ADMIN — PROPOFOL 20 MG: 10 INJECTION, EMULSION INTRAVENOUS at 09:32

## 2020-01-17 RX ADMIN — PROPOFOL 20 MG: 10 INJECTION, EMULSION INTRAVENOUS at 09:26

## 2020-01-17 RX ADMIN — PROPOFOL 20 MG: 10 INJECTION, EMULSION INTRAVENOUS at 09:30

## 2020-01-17 RX ADMIN — PROPOFOL 20 MG: 10 INJECTION, EMULSION INTRAVENOUS at 09:21

## 2020-01-17 RX ADMIN — PROPOFOL 20 MG: 10 INJECTION, EMULSION INTRAVENOUS at 09:38

## 2020-01-17 RX ADMIN — MIDAZOLAM HYDROCHLORIDE 1 MG: 1 INJECTION, SOLUTION INTRAMUSCULAR; INTRAVENOUS at 09:03

## 2020-01-17 RX ADMIN — ONDANSETRON 4 MG: 2 INJECTION INTRAMUSCULAR; INTRAVENOUS at 09:03

## 2020-01-17 RX ADMIN — PROPOFOL 20 MG: 10 INJECTION, EMULSION INTRAVENOUS at 09:47

## 2020-01-17 RX ADMIN — PROPOFOL 50 MG: 10 INJECTION, EMULSION INTRAVENOUS at 09:16

## 2020-01-17 RX ADMIN — PROPOFOL 20 MG: 10 INJECTION, EMULSION INTRAVENOUS at 09:36

## 2020-01-17 NOTE — ANESTHESIA POSTPROCEDURE EVALUATION
Patient: Harish Anaya    Procedure Summary     Date:  01/17/20 Room / Location:  [facility] / [facility]    Anesthesia Start:  0901 Anesthesia Stop:      Procedure:  COLONOSCOPY (N/A Anus) Diagnosis:       Lower abdominal pain      Constipation, unspecified constipation type      Left lower quadrant pain      History of benign carcinoid tumor      Colon cancer screening      (Lower abdominal pain [R10.30])      (Constipation, unspecified constipation type [K59.00])      (Left lower quadrant pain [R10.32])      (History of benign carcinoid tumor [Z86.012])      (Colon cancer screening [Z12.11])    Surgeon:  Ruddy Lara MD Provider:  Sg Billings CRNA    Anesthesia Type:  MAC ASA Status:  3          Anesthesia Type: MAC    Vitals  No vitals data found for the desired time range.          Post Anesthesia Care and Evaluation    Patient location during evaluation: PHASE II  Patient participation: complete - patient participated  Level of consciousness: awake  Pain score: 0  Pain management: adequate  Airway patency: patent  Anesthetic complications: No anesthetic complications  PONV Status: none  Cardiovascular status: acceptable  Respiratory status: acceptable and nasal cannula  Hydration status: acceptable    Comments: vsss resp spont, reflexes intact, responsive, report given to pacu nurse

## 2020-01-17 NOTE — H&P
Chief complaint: Colon cancer screening.    History of present illness: The patient has history of constipation.  History of carcinoid.  The patient had diverticulitis which has been treated and resolved.  Reflux.    Past medical history:   Past Medical History:   Diagnosis Date   • Allergic rhinitis    • BMI 38.0-38.9,adult    • Diabetes mellitus (CMS/HCC)     borderline   • Diverticulosis of colon without hemorrhage    • Dizziness    • Heartburn    • Hyperlipidemia    • Malaise and fatigue    • Polyuria    • PONV (postoperative nausea and vomiting)    • Sore throat        Surgical history:    Past Surgical History:   Procedure Laterality Date   • APPENDECTOMY     • CHOLECYSTECTOMY     • COLONOSCOPY      complete    • ELBOW PROCEDURE         Social history:  Social History     Socioeconomic History   • Marital status:      Spouse name: Not on file   • Number of children: Not on file   • Years of education: Not on file   • Highest education level: Not on file   Tobacco Use   • Smoking status: Former Smoker   Substance and Sexual Activity   • Alcohol use: No   • Drug use: No   • Sexual activity: Defer       Allergies:  Patient has no known allergies.  Latex allergy: None  Contrast allergy: None    Medications:  Medications Prior to Admission   Medication Sig Dispense Refill Last Dose   • bisacodyl (DULCOLAX) 5 MG EC tablet Take As Directed. 5 tablet 0 1/16/2020 at Unknown time   • magnesium citrate 1.745 GM/30ML solution solution Take As Directed. 592 mL 0 1/16/2020 at 0400   • metoclopramide (REGLAN) 5 MG tablet Take As Directed. 2 tablet 0 1/16/2020 at Unknown time   • Probiotic capsule Take 1 capsule by mouth Daily. 30 capsule 1 Taking   • amitriptyline (ELAVIL) 10 MG tablet Take 1 tablet by mouth At Night As Needed for Sleep. 30 tablet 2    • atorvastatin (LIPITOR) 40 MG tablet Take 1 tablet by mouth Every Night. 30 tablet 2        Review of systems:   Constitutional: No recent: Fever, Weight loss,  "  Respiratory: No recent: SOB, Cough,   Cardiovascular: No recent: Chest Pains, congestive heart failure or arrhythmias.   Neurological: No recent: Seizures, CVA, TIA.   Genitourinary: No recent: Renal Failure, UTI.  Endocrine: No recent: Worsening of diabetes or thyroid disease.  Musculoskeletal:   No recent: Joint swelling.  Hem. Oncology: No recent: Anemia or bleeding.  Psychiatric: No recent: Worsening of depression or anxiety.     VITAL SIGNS:    Blood pressure 148/80, pulse 65, temperature 98.1 °F (36.7 °C), temperature source Temporal, resp. rate 16, height 182.9 cm (72\"), weight (!) 139 kg (307 lb 6 oz), SpO2 97 %.    PHYSICAL EXAMINATION:   HEENT: Normal.   Lungs: Clear to auscultation.  Heart: No S3, no murmur.    Abdomen: Soft.  BS+ ND, NT  Extremities: No edema.  No cyanosis.  Neuro: Alert X 3. No focal deficit.    Assessment: Colon cancer screening.  History of carcinoid.  The patient has history of diverticulitis which has been treated and resolved.  History of constipation.    Plan:       Risks/Benefits:  The potential benefits, risk and/or side effects of the procedure and alternatives have been discussed with the patient/authorized representative and questions were answered.    "

## 2020-01-17 NOTE — DISCHARGE INSTRUCTIONS
To assist you in voiding:  Drink plenty of fluids  Listen to running water while attempting to void.    If you are unable to urinate and you have an uncomfortable urge to void or it has been   6 hours since you were discharged, return to the Emergency Room.    Rest today  No pushing,pulling,tugging,heavy lifting, or strenuous activity   No major decision making,driving,or drinking alcoholic beverages for 24 hours due to the sedation you received  Always use good hand hygiene/washing technique  No driving on pain medication.    ************************************************************************************    Postprocedure instructions:    1. Nothing by mouth to fully alert.  2. Once fully alert may have clear liquid diet.  3. Advance diet as tolerated.  4. Vital signs as routine.    Diet:     1. Low-fat diet.  2. [place] All Bran-Bran Buds 1/4 cup daily.  3. May add Honey Bunches of Oats with Almonds 1/4 cup for taste.  4. Use almond milk, 1 or 2% milk.  5. Drink liberal amounts of water.    Blood Thinner Directions:    Avoid Aspirin & other NSAIDS for _7__ days. Tylenol is okay.    Treatments:  If still constipated may use over-the-counter Schwartz magnesium caplet. Take one caplet one orally at night.    Other Instructions:    Call [facility] at [phone] or come to the Emergency Department if you experience the following: Chest pain, abdominal pain, bleeding (vomiting of blood or coffee colored material, black stools or epi blood in stools), fever/chills, nausea and vomiting or dizziness.      Follow-up:  DR. PATRICE LARA in 4 weeks.Office phone # [phone].    Follow-up colonoscopy: in 5 years.    ************************************************************************************    Notes to the patient and the family from Dr. Lara.    Dear patient/family member,    Today your colon was examined extensively from rectum to cecum and beyond into the small intestine twice.   Findings  on today's procedure are as follows:    1. Diverticulosis. These are benign outpouchings in the colon.   2. Colon polyps. 4 were found and removed.   3. No cancer. No inflammation or colitis. No suggestion of Crohn's disease.  4. Internal hemorrhoids.    Recommendations:    1. As above.      Should you have more questions please do not hesitate to talk to the nurse who can call me and let me talk to you.      I hope you feel better.    Ruddy Lara M.D., FACP, FACG.

## 2020-01-17 NOTE — ANESTHESIA PREPROCEDURE EVALUATION
Anesthesia Evaluation     Patient summary reviewed and Nursing notes reviewed   history of anesthetic complications: PONV  NPO Solid Status: > 8 hours  NPO Liquid Status: > 8 hours           Airway   Mallampati: II  TM distance: >3 FB  Neck ROM: full  Difficult intubation highly probable and Possible difficult intubation  Dental      Pulmonary    (+) a smoker Former, COPD, shortness of breath, sleep apnea, decreased breath sounds,   Cardiovascular     (+) PELAYO, hyperlipidemia,   CAD:  inc risk obese, florina, dm.      Neuro/Psych  (+) dizziness/light headedness,     GI/Hepatic/Renal/Endo    (+)   diabetes mellitus type 2 poorly controlled,     Musculoskeletal     (+) arthralgias, back pain, chronic pain, myalgias, neck pain,   Abdominal   (+) obese,    Substance History      OB/GYN          Other                        Anesthesia Plan    ASA 3     MAC   (Risks and benefits discussed including risk of aspiration, recall and dental damage. All patient questions answered.    Will continue with plan of care.)  intravenous induction     Anesthetic plan, all risks, benefits, and alternatives have been provided, discussed and informed consent has been obtained with: patient.    Plan discussed with CRNA.

## 2020-01-22 LAB
LAB AP CASE REPORT: NORMAL
PATH REPORT.FINAL DX SPEC: NORMAL

## 2020-01-23 ENCOUNTER — OFFICE VISIT (OUTPATIENT)
Dept: UROLOGY | Facility: CLINIC | Age: 50
End: 2020-01-23

## 2020-01-23 VITALS
HEART RATE: 74 BPM | OXYGEN SATURATION: 99 % | TEMPERATURE: 98 F | DIASTOLIC BLOOD PRESSURE: 82 MMHG | SYSTOLIC BLOOD PRESSURE: 144 MMHG

## 2020-01-23 DIAGNOSIS — E29.1 HYPOGONADISM MALE: Primary | ICD-10-CM

## 2020-01-23 PROCEDURE — 99204 OFFICE O/P NEW MOD 45 MIN: CPT | Performed by: UROLOGY

## 2020-01-23 RX ORDER — TESTOSTERONE CYPIONATE 200 MG/ML
400 INJECTION, SOLUTION INTRAMUSCULAR
Qty: 4 ML | Refills: 2 | Status: SHIPPED | OUTPATIENT
Start: 2020-01-23 | End: 2020-03-03

## 2020-01-23 RX ORDER — TESTOSTERONE CYPIONATE 200 MG/ML
400 INJECTION, SOLUTION INTRAMUSCULAR
Status: DISCONTINUED | OUTPATIENT
Start: 2020-01-23 | End: 2020-02-21

## 2020-01-23 NOTE — PROGRESS NOTES
Chief Complaint  Low Testosterone        HPI  Héctor is a 49 y.o. male who is referred today for evaluation of hypogonadism and low testosterone level.  He states that for the past several years he has had a declining energy level progressive problems with fatigue and weakness and now progressive problems with weight gain aching in the muscles low sexual desire and high cholesterol.  He admits to being a big meat eater and even tried the Atkins diet last year to try to lose weight.  Is having problems with erectile dysfunction for the past year but that is not a priority for him.  He has been told he may need Lipitor for his elevated cholesterol but has not taken it because he already has aching in the muscles.  Fortunately his LDL cholesterol is significantly elevated at 174.  Thankfully his thyroid and A1c were okay.    Vitals:    01/23/20 1449   BP: 144/82   Pulse: 74   Temp: 98 °F (36.7 °C)   SpO2: 99%       Past Medical History  Past Medical History:   Diagnosis Date   • Allergic rhinitis    • BMI 38.0-38.9,adult    • Diabetes mellitus (CMS/HCC)     borderline   • Diverticulosis of colon without hemorrhage    • Dizziness    • Heartburn    • Hyperlipidemia    • Malaise and fatigue    • Polyuria    • PONV (postoperative nausea and vomiting)    • Sore throat        Past Surgical History  Past Surgical History:   Procedure Laterality Date   • APPENDECTOMY     • CHOLECYSTECTOMY     • COLONOSCOPY      complete    • COLONOSCOPY N/A 1/17/2020    Procedure: COLONOSCOPY;  Surgeon: Ruddy Lara MD;  Location: [facility] ENDOSCOPY;  Service: Gastroenterology   • ELBOW PROCEDURE         Medications  has a current medication list which includes the following prescription(s): amitriptyline, atorvastatin, and probiotic.      Allergies  No Known Allergies    Social History  Social History     Socioeconomic History   • Marital status:      Spouse name: Not on file   • Number of children: Not on file   • Years of education:  Not on file   • Highest education level: Not on file   Tobacco Use   • Smoking status: Former Smoker   • Smokeless tobacco: Never Used   Substance and Sexual Activity   • Alcohol use: No   • Drug use: No   • Sexual activity: Defer       Family History  He has no family history of bladder or kidney cancer  He has no family history of kidney stones      AUA Symptom Score:      Review of Systems  Review of Systems  Positive for feeling poorly tired weight gain eyesight problems palpitations leg cramps dyspnea with exertion painful joints frequent heartburn problems swallowing limb weakness sleep disturbance hot flashes feeling of weakness negative in other categories.  Physical Exam  Physical Exam   Constitutional: He is oriented to person, place, and time. He appears well-developed and well-nourished.   HENT:   Head: Normocephalic and atraumatic.   Neck: Normal range of motion.   Pulmonary/Chest: Effort normal. No respiratory distress.   Abdominal: Soft. He exhibits no distension and no mass. There is no tenderness. No hernia. Hernia confirmed negative in the right inguinal area and confirmed negative in the left inguinal area.   Genitourinary: Penis normal.         Musculoskeletal: Normal range of motion.   Lymphadenopathy:     He has no cervical adenopathy.   Neurological: He is alert and oriented to person, place, and time.   Skin: Skin is warm and dry.   Psychiatric: He has a normal mood and affect. His behavior is normal.   Vitals reviewed.      Labs Recent and today in the office:  Results for orders placed or performed during the hospital encounter of 01/17/20   Tissue Pathology Exam   Result Value Ref Range    Case Report       Surgical Pathology Report                         Case: [id number]                                  Authorizing Provider:  Ruddy Lara MD         Collected:           01/17/2020 09:28 AM          Ordering Location:     [facility]    Received:            01/17/2020 12:24  PM                                 [facility] ENDO                                                                    Pathologist:           Thomas Abrams MD                                                             Specimens:   1) - Large Intestine, Transverse Colon                                                              2) - Large Intestine, Left / Descending Colon                                                       3) - Large Intestine, Rectum, area of previous resection biopsy                                     4) - Large Intestine, Cecum                                                                         5) - Large Intestine, Right / Ascending Colon                                                        6) - Anus, polypoid area - HPV P16                                                         Final Diagnosis       See Scanned Report           Assessment and plan:    Hypogonadism: Patient has had rather severe progressive problems for the past several years and is noted to have a near castrate level of testosterone.  I have therefore recommended a complete evaluation of his pituitary gonadal axis and then return for testosterone supplement for a trial.  If indicated.  It is explained about the critical need to monitor for toxicity of therapy with routine blood work and there is a possibility that he will need to take Arimidex to prevent testosterone conversion to estrogen and periodic blood donation if he develops polycythemia.  He has a negative family history of prostate cancer but we will monitor his PSA and he will need a TIFFANY if it ever goes up and when he turns 51.    He is informed about taking coenzyme Q 10 to reduce the muscle aching from statins and more importantly to make radical changes in his diet towards a more plant-based heart healthy diet such as the Mediterranean diet.    He will receive Depo-testosterone 400 mg every 14 days and then return in 2 months with follow-up blood work to  assess for toxicity.

## 2020-01-26 LAB
ESTRADIOL SERPL-MCNC: 23.3 PG/ML (ref 7.6–42.6)
LH SERPL-ACNC: 2.3 MIU/ML (ref 1.7–8.6)
PROLACTIN SERPL-MCNC: 34.9 NG/ML (ref 4–15.2)
PSA SERPL-MCNC: 0.08 NG/ML (ref 0–4)
TESTOST FREE SERPL-MCNC: 2.7 PG/ML (ref 6.8–21.5)
TESTOST SERPL-MCNC: 34.5 NG/DL (ref 264–916)

## 2020-01-28 ENCOUNTER — CLINICAL SUPPORT (OUTPATIENT)
Dept: UROLOGY | Facility: CLINIC | Age: 50
End: 2020-01-28

## 2020-01-28 DIAGNOSIS — E29.1 HYPOGONADISM IN MALE: ICD-10-CM

## 2020-01-28 PROCEDURE — 96372 THER/PROPH/DIAG INJ SC/IM: CPT | Performed by: UROLOGY

## 2020-01-28 RX ADMIN — TESTOSTERONE CYPIONATE 400 MG: 200 INJECTION, SOLUTION INTRAMUSCULAR at 13:44

## 2020-01-28 NOTE — PROGRESS NOTES
Patient in office for Testosterone Injection. Patient supplied medication.  No reaction at site  [id number]  Exp-07/2021  Lot-[id number]  KG/NIKKI

## 2020-01-30 ENCOUNTER — TELEPHONE (OUTPATIENT)
Dept: UROLOGY | Facility: CLINIC | Age: 50
End: 2020-01-30

## 2020-01-30 NOTE — TELEPHONE ENCOUNTER
I called patient per Dr. Mcgovern to let him know his prolactin level came back elevated/abnormal, patient can continue depo testo injections but Dr. Mcgovern wants patient to see an endocrinologist, at this time patient wants to discuss with his wife and he or she will call us back.  AR/CMA

## 2020-01-31 DIAGNOSIS — R79.89 PROLACTIN INCREASED: Primary | ICD-10-CM

## 2020-02-11 ENCOUNTER — CLINICAL SUPPORT (OUTPATIENT)
Dept: UROLOGY | Facility: CLINIC | Age: 50
End: 2020-02-11

## 2020-02-11 DIAGNOSIS — R79.89 LOW TESTOSTERONE: ICD-10-CM

## 2020-02-11 PROCEDURE — 96372 THER/PROPH/DIAG INJ SC/IM: CPT | Performed by: UROLOGY

## 2020-02-11 RX ADMIN — TESTOSTERONE CYPIONATE 400 MG: 200 INJECTION, SOLUTION INTRAMUSCULAR at 13:40

## 2020-02-11 NOTE — PROGRESS NOTES
2ml/400mg of testosterone was given Right dorsogluteal IM. No site reaction or other complications noted.   ndc:[id number]  lot#[id number]  Exp:07/2021  *patient supplied

## 2020-02-13 ENCOUNTER — OFFICE VISIT (OUTPATIENT)
Dept: GASTROENTEROLOGY | Facility: CLINIC | Age: 50
End: 2020-02-13

## 2020-02-13 VITALS
DIASTOLIC BLOOD PRESSURE: 80 MMHG | WEIGHT: 311 LBS | HEART RATE: 72 BPM | HEIGHT: 72 IN | TEMPERATURE: 97.4 F | SYSTOLIC BLOOD PRESSURE: 120 MMHG | BODY MASS INDEX: 42.12 KG/M2

## 2020-02-13 DIAGNOSIS — E66.3 OVER WEIGHT: ICD-10-CM

## 2020-02-13 DIAGNOSIS — R12 HEARTBURN: ICD-10-CM

## 2020-02-13 DIAGNOSIS — D12.2 ADENOMATOUS POLYP OF ASCENDING COLON: Primary | ICD-10-CM

## 2020-02-13 DIAGNOSIS — K57.30 DIVERTICULOSIS OF COLON WITHOUT HEMORRHAGE: ICD-10-CM

## 2020-02-13 PROCEDURE — 99214 OFFICE O/P EST MOD 30 MIN: CPT | Performed by: INTERNAL MEDICINE

## 2020-02-13 RX ORDER — MULTIPLE VITAMINS W/ MINERALS TAB 9MG-400MCG
1 TAB ORAL 2 TIMES DAILY
COMMUNITY

## 2020-02-13 NOTE — PROGRESS NOTES
Chief Complaint   Patient presents with   • Colon Polyps     History of Present Illness     The patient had undergone a colonoscopy to terminal ileum in January 2020.  He was found to have 4 small 3-5 millimeters sessile polyps which were removed.  Biopsies from the cecum and ascending colon polyps revealed tubular adenomata.  An area of previously resected submucosal nodule site appeared to be stable.  The patient was also found to have a perianal polypoid area.  Biopsies did not reveal HPV changes.  He was found to have pandiverticulosis.    Currently the patient feels okay in terms of reflux.  He denies dysphagia or odynophagia.  There is no nausea or vomiting.  He denies abdominal pain.  The patient denies diarrhea or constipation.  He has been having good bowel movements.  There is no overt GI bleed (hematemesis, melena or hematochezia).  The patient has been having generalized fatigability and tiredness.  Recent laboratory tests show low normal B12.  The patient is also being worked up  from endocrine point of view.  There is no anemia.  There is no history of liver or pancreatic disease.       Review of Systems   Constitutional: Positive for fatigue. Negative for appetite change, chills, fever and unexpected weight change.   HENT: Negative for mouth sores, nosebleeds and trouble swallowing.    Eyes: Negative for discharge and redness.   Respiratory: Positive for apnea. Negative for cough and shortness of breath.    Cardiovascular: Negative for chest pain, palpitations and leg swelling.   Gastrointestinal: Negative for abdominal distention, abdominal pain, anal bleeding, blood in stool, constipation, diarrhea, nausea and vomiting.   Endocrine: Negative for cold intolerance, heat intolerance and polydipsia.   Genitourinary: Negative for dysuria, hematuria and urgency.   Musculoskeletal: Negative for arthralgias, joint swelling and myalgias.   Skin: Negative for rash.   Allergic/Immunologic: Positive for  environmental allergies. Negative for food allergies and immunocompromised state.   Neurological: Negative for dizziness, seizures, syncope and headaches.   Hematological: Negative for adenopathy. Does not bruise/bleed easily.   Psychiatric/Behavioral: Negative for dysphoric mood. The patient is not nervous/anxious and is not hyperactive.      Patient Active Problem List   Diagnosis   • Temporomandibular joint disorder   • Borderline diabetes   • Chronic fatigue   • Lower abdominal pain   • Constipation   • Left lower quadrant pain   • History of benign carcinoid tumor   • Colon cancer screening     Past Medical History:   Diagnosis Date   • Allergic rhinitis    • BMI 38.0-38.9,adult    • Diabetes mellitus (CMS/HCC)     borderline   • Diverticulosis of colon without hemorrhage    • Dizziness    • Heartburn    • Hyperlipidemia    • Malaise and fatigue    • Polyuria    • PONV (postoperative nausea and vomiting)    • Sore throat    • Vegetarian diet     since 3 wk ago     Past Surgical History:   Procedure Laterality Date   • APPENDECTOMY     • CHOLECYSTECTOMY     • COLONOSCOPY      complete    • COLONOSCOPY N/A 1/17/2020    Procedure: COLONOSCOPY;  Surgeon: Ruddy Lara MD;  Location: [facility] ENDOSCOPY;  Service: Gastroenterology   • ELBOW PROCEDURE       Family History   Problem Relation Age of Onset   • Depression Mother    • Cancer Maternal Grandmother    • Melanoma Maternal Grandmother    • Parkinsonism Maternal Grandfather    • Diabetes Maternal Aunt    • Alcohol abuse Maternal Uncle    • Liver disease Maternal Uncle    • Colon cancer Neg Hx    • Cirrhosis Neg Hx    • Liver cancer Neg Hx      Social History     Tobacco Use   • Smoking status: Former Smoker   • Smokeless tobacco: Never Used   Substance Use Topics   • Alcohol use: No       Current Outpatient Medications:   •  amitriptyline (ELAVIL) 10 MG tablet, Take 1 tablet by mouth At Night As Needed for Sleep., Disp: 30 tablet, Rfl: 2  •  Ascorbic Acid  "(VITAMIN C GUMMIE PO), Take  by mouth Daily., Disp: , Rfl:   •  atorvastatin (LIPITOR) 40 MG tablet, Take 1 tablet by mouth Every Night., Disp: 30 tablet, Rfl: 2  •  COENZYME Q10 PO, Take 1 tablet by mouth Daily., Disp: , Rfl:   •  Multiple Vitamins-Minerals (MULTIVITAMIN WITH MINERALS) tablet tablet, Take 1 tablet by mouth 2 (Two) Times a Day., Disp: , Rfl:   •  Probiotic capsule, Take 1 capsule by mouth Daily., Disp: 30 capsule, Rfl: 1  •  Testosterone Cypionate (DEPO-TESTOSTERONE) 200 MG/ML injection, Inject 2 mL into the appropriate muscle as directed by prescriber Every 14 (Fourteen) Days., Disp: 4 mL, Rfl: 2    Current Facility-Administered Medications:   •  Testosterone Cypionate (DEPOTESTOTERONE CYPIONATE) injection 400 mg, 400 mg, Intramuscular, Q14 Days, Sg Mcgovern MD, 400 mg at 02/11/20 1340    No Known Allergies    Blood pressure 120/80, pulse 72, temperature 97.4 °F (36.3 °C), height 182.9 cm (72\"), weight (!) 141 kg (311 lb).    Physical Exam   Constitutional: He is oriented to person, place, and time. He appears well-developed and well-nourished. No distress.   HENT:   Head: Normocephalic and atraumatic.   Right Ear: Hearing and external ear normal.   Left Ear: Hearing and external ear normal.   Nose: Nose normal.   Mouth/Throat: Oropharynx is clear and moist and mucous membranes are normal. Mucous membranes are not pale, not dry and not cyanotic. No oral lesions. No oropharyngeal exudate.   Eyes: Conjunctivae and EOM are normal. Right eye exhibits no discharge. Left eye exhibits no discharge. No scleral icterus.   Neck: Trachea normal. Neck supple. No JVD present. No edema present. No thyroid mass and no thyromegaly present.   Cardiovascular: Normal rate, regular rhythm, S2 normal and normal heart sounds. Exam reveals no gallop, no S3 and no friction rub.   No murmur heard.  Pulmonary/Chest: Effort normal and breath sounds normal. No respiratory distress. He has no wheezes. He has no rales. He " exhibits no tenderness.   Abdominal: Soft. Normal appearance and bowel sounds are normal. He exhibits no distension, no ascites and no mass. There is no splenomegaly or hepatomegaly. There is no tenderness. There is no rigidity, no rebound and no guarding. No hernia.   Musculoskeletal: He exhibits no tenderness or deformity.     Vascular Status -  His right foot exhibits no edema. His left foot exhibits no edema.  Lymphadenopathy:     He has no cervical adenopathy.        Left: No supraclavicular adenopathy present.   Neurological: He is alert and oriented to person, place, and time. He has normal strength. No cranial nerve deficit or sensory deficit. He exhibits normal muscle tone. Coordination normal.   Skin: No rash noted. He is not diaphoretic. No cyanosis. No pallor. Nails show no clubbing.   Psychiatric: He has a normal mood and affect. His behavior is normal. Judgment and thought content normal.   Nursing note and vitals reviewed.  Stigmata of chronic liver disease:  None.  Asterixis:  None.    Laboratory Testing:  Upon review of medical records:    Dated March 16, 2017 sodium 145 potassium 4.8 chloride 106 CO2 30 BUN 16 serum creatinine 1.10 glucose 109.  Calcium 9.7.  Total protein 7.4.  Albumin 4.70.  T bili 0.9 AST 29 ALT 51 alkaline phosphatase 71.  Total cholesterol 243.  Triglycerides 126.  TSH 1.630.  Hemoglobin A1c 5.70%.  WBC 7.97 hemoglobin 15.7 hematocrit 48.3 MCV 90.1 and platelet count 218.    Dated January 11, 2020 sodium 142 potassium 4.6 chloride 104 CO2 25.8 BUN 11 serum creatinine 0.95 glucose 120.  Calcium 9.1.  Total protein 7.1.  Albumin 4.60.  T bili 0.3 AST 18 ALT 30 alkaline phosphatase 68.  Total cholesterol 225.  Triglycerides 95.  TSH 1.400.  Vitamin B12 level 283.  WBC 6.53 hemoglobin 14.7 hematocrit 44.0 MCV 90.2 and platelet count 226.     Procedures:   Upon review of records:     Dated May 14, 2014 the patient underwent a colonoscopy to the terminal ileum which revealed:  Left-sided diverticulosis, with an area of significant erythema and swelling in the left colon consistent with underlying diverticulitis, colon polyps, inverted diverticula, rectal nodule, and internal hemorrhoids.  Mucosa was noted to be somewhat flattened within the terminal ileum. Small bowel, terminal ileum, biopsy revealed no pathologic alterations, negative for celiac disease.  Random colon biopsy revealed no significant pathologic alterations.  Sigmoid colon polyp, biopsy revealed sessile serrated adenoma's without high-grade dysplasia (x2).  Sigmoid colon polyp, biopsy revealed inflammatory polyps (x2).  Rectum, biopsy revealed mucosal lymphoid aggregates (x4).     Dated May 14, 2014 the patient underwent an upper endoscopy which revealed: Severe erosive distal esophagitis grade 4, free gastroesophageal reflux, distal esophageal diverticulum (small), early stricturing of the distal esophagus, polypoid areas at the cardia, sliding hiatal hernia (less than 3 cm), and erythematous gastritis.  A possible underlying Pierce's cannot be excluded.  No scalloping was seen within the second portion of duodenum.  A significant amount of bile was noted within the stomach and this was suctioned.  Second portion of duodenum, biopsy revealed no pathologic alterations.  Antrum and body, biopsy revealed no pathologic alterations.  No H. pylori identified (negative CFV stain).  Stomach, cardia, biopsy revealed chronic active carditis.  No H. pylori identified (negative CFV stain, adequate control).  Negative for metaplasia, dysplasia, or malignancy.  Mid base, esophagus, left lateral, biopsy revealed reflux esophagitis.  No metaplasia identified (negative AB/PAS stain, adequate control).     Dated January 17, 2020 the patient underwent colonoscopy to terminal ileum which revealed: Pandiverticulosis.  Colon polyps.  4 were removed.  3-5 mm in size.  Internal hemorrhoids.  Verrucoid skin lesion in the perineum.  Biopsied.   Transverse colon polyp, biopsy revealed early hyperplastic polyp.  Descending colon polyp, biopsy revealed adenomatous polyp (tubular adenoma).  Negative for high-grade dysplasia.  Rectal, area of previous resection, biopsy revealed colonic type mucosa with nonspecific surface erosions.  No additional abnormalities identified.  Cecum polyp, biopsy revealed benign mucosal fold, multiple deeper levels evaluated.  Ascending colon polyp, biopsy revealed sessile serrated adenoma.  Negative for cytologic dysplasia.  Anal, polypoid area, HPV P 16, biopsy revealed mildly polypoid squamous mucosa with no evidence of viral cytopathic change or dysplasia.  Negative for P16 (immunohistochemical stain with good control, requested by ordering clinician).      Assessment:      ICD-10-CM ICD-9-CM   1. Adenomatous polyp of ascending colon D12.2 211.3   2. Diverticulosis of colon without hemorrhage K57.30 562.10   3. Heartburn R12 787.1   4. Over weight E66.3 278.02         Discussion:  1.     Plan/  Patient Instructions   1. Antireflux measures.  2. Low-fat diet.  3. Low impact regular exercise.  4. Weight reduction.  5. Protonix (Pantoprazole) tablet 40 mg tablet. Take 1 tablet orally in the morning half an hour before eating every day.  6. It may be prudent to check MMA (the patient has low normal B12 level).  There is no history of renal insufficiency.  7. Of interest the patient will be having endocrine evaluation in the near future.  8. A follow-up colonoscopy is recommended in 5 years (January 2025).  9. For follow-up the patient will call back.       Ruddy Lara MD

## 2020-02-14 NOTE — PATIENT INSTRUCTIONS
1. Antireflux measures.  2. Low-fat diet.  3. Low impact regular exercise.  4. Weight reduction.  5. Protonix (Pantoprazole) tablet 40 mg tablet. Take 1 tablet orally in the morning half an hour before eating every day.  6. It may be prudent to check MMA (the patient has low normal B12 level).  There is no history of renal insufficiency.  7. Of interest the patient will be having endocrine evaluation in the near future.  8. A follow-up colonoscopy is recommended in 5 years (January 2025).  9. For follow-up the patient will call back.

## 2020-02-21 ENCOUNTER — OFFICE VISIT (OUTPATIENT)
Dept: INTERNAL MEDICINE | Facility: CLINIC | Age: 50
End: 2020-02-21

## 2020-02-21 VITALS
RESPIRATION RATE: 17 BRPM | BODY MASS INDEX: 41.85 KG/M2 | WEIGHT: 309 LBS | OXYGEN SATURATION: 100 % | HEIGHT: 72 IN | DIASTOLIC BLOOD PRESSURE: 75 MMHG | HEART RATE: 72 BPM | TEMPERATURE: 98 F | SYSTOLIC BLOOD PRESSURE: 142 MMHG

## 2020-02-21 DIAGNOSIS — E78.5 HYPERLIPIDEMIA, UNSPECIFIED HYPERLIPIDEMIA TYPE: ICD-10-CM

## 2020-02-21 DIAGNOSIS — E53.8 LOW VITAMIN B12 LEVEL: ICD-10-CM

## 2020-02-21 DIAGNOSIS — R73.03 BORDERLINE DIABETES: ICD-10-CM

## 2020-02-21 DIAGNOSIS — R79.89 ELEVATED PROLACTIN LEVEL: ICD-10-CM

## 2020-02-21 DIAGNOSIS — R53.82 CHRONIC FATIGUE: ICD-10-CM

## 2020-02-21 DIAGNOSIS — R79.89 LOW TESTOSTERONE IN MALE: Primary | ICD-10-CM

## 2020-02-21 PROCEDURE — 99214 OFFICE O/P EST MOD 30 MIN: CPT | Performed by: INTERNAL MEDICINE

## 2020-02-21 NOTE — PATIENT INSTRUCTIONS
Chronic Fatigue Syndrome  Chronic fatigue syndrome (CFS) is a condition that causes extreme tiredness (fatigue). This fatigue does not improve with rest, and it gets worse with physical or mental activity. You may have several other symptoms along with fatigue.  Symptoms may come and go, but they generally last for months. Sometimes, CFS gets better over time, but it can be a lifelong condition. There is no cure, but there are many possible treatments. You will need to work with your health care providers to find a treatment plan that works best for you.  What are the causes?  The cause of CFS is not known. There may be more than one cause. Possible causes include:  · An infection.  · An abnormal body defense system (immune system).  · Low blood pressure.  · Poor diet.  · Physical or emotional stress.  What increases the risk?  You are more likely to develop this condition if:  · You are female.  · You are 40?50 years old.  · You have a family history of CFS.  · You live with a lot of emotional stress.  What are the signs or symptoms?  The main symptom of CFS is fatigue that is severe enough to interfere with day-to-day activities. This fatigue does not get better with rest, and it gets worse with physical or mental activity. There are eight other major symptoms of CFS:  · Lack of energy (malaise) that lasts more than 24 hours after physical exertion.  · Sleep that does not relieve fatigue (unrefreshing sleep).  · Short-term memory loss or confusion.  · Joint pain without redness or swelling.  · Muscle aches.  · Headaches.  · Painful and swollen glands (lymph nodes) in the neck or under the arms.  · Sore throat.  You may also have:  · Abdominal cramps, constipation, or diarrhea (irritable bowel).  · Chills.  · Night sweats.  · Vision changes.  · Dizziness.  · Mental confusion (brain fog).  · Clumsiness.  · Sensitivity to food, noise, or odors.  · Mood swings, depression, or anxiety attacks.  How is this  diagnosed?  There are no tests that can diagnose this condition. Your health care provider will make the diagnosis based on your medical history, a physical exam, and a mental health exam. However, it is important to make sure that your symptoms are not caused by another medical condition. You may have lab tests or X-rays to rule out other conditions.  For your health care provider to diagnose CFS:  · You must have had fatigue for at least 6 straight months.  · Fatigue must be your first symptom, and it must be severe enough to interfere with day-to-day activities.  · There must be no other cause found for the fatigue.  · You must also have at least four of the eight other major symptoms of CFS.  How is this treated?  There is no cure for CFS. The condition affects everyone differently. You will need to work with your team of health care providers to find the best treatments for your symptoms. Your team may include your primary care provider, physical and exercise therapists, and mental health therapists. Treatment may include:  · Improving sleep with a regular bedtime routine.  · Avoiding caffeine, alcohol, and tobacco.  · Doing light exercise and stretching during the day.  · Taking medicines to help you sleep or to relieve joint or muscle pain.  · Learning and practicing relaxation techniques.  · Using memory aids or doing brainteasers to improve memory and concentration.  · Seeing a mental health therapist to evaluate and treat depression, if necessary.  · Trying massage therapy, acupuncture, and movement exercises, such as yoga or leopoldo chi.  Follow these instructions at home:    Activity  · Exercise regularly, as told by your health care provider.  · Avoid fatigue by pacing yourself during the day and getting enough sleep at night.  · Go to bed and get up at the same time every day.  Eating and drinking  · Avoid caffeine and alcohol.  · Avoid heavy meals in the evening.  · Eat a well-balanced diet.  General  instructions  · Take over-the-counter and prescription medicines only as told by your health care provider.  · Do not use herbal or dietary supplements unless they are approved by your health care provider.  · Maintain a healthy weight.  · Avoid stress and use stress-reducing techniques that you learn in therapy.  · Do not use any products that contain nicotine or tobacco, such as cigarettes and e-cigarettes. If you need help quitting, ask your health care provider.  · Consider joining a CFS support group.  · Keep all follow-up visits as told by your health care provider. This is important.  Contact a health care provider if:  · Your symptoms do not get better or they get worse.  · You feel angry, guilty, anxious, or depressed.  This information is not intended to replace advice given to you by your health care provider. Make sure you discuss any questions you have with your health care provider.  Document Released: 01/25/2006 Document Revised: 08/24/2017 Document Reviewed: 03/27/2017  [employer] Interactive Patient Education © 2020 [employer]

## 2020-02-22 ENCOUNTER — LAB (OUTPATIENT)
Dept: LAB | Facility: HOSPITAL | Age: 50
End: 2020-02-22

## 2020-02-22 DIAGNOSIS — R79.89 ELEVATED PROLACTIN LEVEL: ICD-10-CM

## 2020-02-22 DIAGNOSIS — E53.8 LOW VITAMIN B12 LEVEL: ICD-10-CM

## 2020-02-22 LAB
HCYS SERPL-MCNC: 14.4 UMOL/L (ref 0–15)
PROLACTIN SERPL-MCNC: 115 NG/ML (ref 4.04–15.2)

## 2020-02-22 PROCEDURE — 83921 ORGANIC ACID SINGLE QUANT: CPT

## 2020-02-22 PROCEDURE — 83090 ASSAY OF HOMOCYSTEINE: CPT

## 2020-02-22 PROCEDURE — 84146 ASSAY OF PROLACTIN: CPT

## 2020-02-22 PROCEDURE — 36415 COLL VENOUS BLD VENIPUNCTURE: CPT

## 2020-02-23 DIAGNOSIS — E22.1 HYPERPROLACTINEMIA (HCC): Primary | ICD-10-CM

## 2020-02-23 NOTE — PROGRESS NOTES
Let the patient know that I have ordered an MRI of the head for further evaluation of hyperprolactinemia.  His levels were higher on his recent blood work.

## 2020-02-26 LAB
Lab: NORMAL
METHYLMALONATE SERPL-SCNC: 157 NMOL/L (ref 0–378)

## 2020-02-27 ENCOUNTER — HOSPITAL ENCOUNTER (OUTPATIENT)
Dept: MRI IMAGING | Facility: HOSPITAL | Age: 50
Discharge: HOME | End: 2020-02-27
Admitting: INTERNAL MEDICINE

## 2020-02-27 DIAGNOSIS — E22.1 HYPERPROLACTINEMIA (HCC): ICD-10-CM

## 2020-02-27 PROCEDURE — A9577 INJ MULTIHANCE: HCPCS | Performed by: INTERNAL MEDICINE

## 2020-02-27 PROCEDURE — 70553 MRI BRAIN STEM W/O & W/DYE: CPT

## 2020-02-27 PROCEDURE — 0 GADOBENATE DIMEGLUMINE 529 MG/ML SOLUTION: Performed by: INTERNAL MEDICINE

## 2020-02-27 RX ADMIN — GADOBENATE DIMEGLUMINE 15 ML: 529 INJECTION, SOLUTION INTRAVENOUS at 19:54

## 2020-02-27 RX ADMIN — GADOBENATE DIMEGLUMINE 13 ML: 529 INJECTION, SOLUTION INTRAVENOUS at 19:52

## 2020-02-28 ENCOUNTER — APPOINTMENT (OUTPATIENT)
Dept: MRI IMAGING | Facility: HOSPITAL | Age: 50
End: 2020-02-28

## 2020-02-28 NOTE — PROGRESS NOTES
Imaging does show positive for a pituitary adenoma which is small.  Please notify endocrinology and see if they can arrange for an earlier appointment. Pt currently has an appointment in a month with [facility] location.

## 2020-03-03 ENCOUNTER — OFFICE VISIT (OUTPATIENT)
Dept: ENDOCRINOLOGY | Facility: CLINIC | Age: 50
End: 2020-03-03

## 2020-03-03 VITALS
HEIGHT: 72 IN | BODY MASS INDEX: 42.1 KG/M2 | OXYGEN SATURATION: 99 % | HEART RATE: 71 BPM | SYSTOLIC BLOOD PRESSURE: 132 MMHG | DIASTOLIC BLOOD PRESSURE: 80 MMHG | WEIGHT: 310.8 LBS

## 2020-03-03 DIAGNOSIS — R29.818 SUSPECTED SLEEP APNEA: ICD-10-CM

## 2020-03-03 DIAGNOSIS — D35.2 PITUITARY MICROADENOMA (HCC): ICD-10-CM

## 2020-03-03 DIAGNOSIS — E29.1 HYPOGONADISM IN MALE: ICD-10-CM

## 2020-03-03 DIAGNOSIS — G47.09 OTHER INSOMNIA: ICD-10-CM

## 2020-03-03 DIAGNOSIS — E22.1 HYPERPROLACTINEMIA (HCC): Primary | ICD-10-CM

## 2020-03-03 DIAGNOSIS — E66.01 MORBID OBESITY (HCC): ICD-10-CM

## 2020-03-03 DIAGNOSIS — E78.5 HYPERLIPIDEMIA, UNSPECIFIED HYPERLIPIDEMIA TYPE: ICD-10-CM

## 2020-03-03 PROBLEM — K59.00 CONSTIPATION: Status: RESOLVED | Noted: 2019-12-27 | Resolved: 2020-03-03

## 2020-03-03 PROBLEM — E78.2 MULTIPLE-TYPE HYPERLIPIDEMIA: Status: ACTIVE | Noted: 2020-03-03

## 2020-03-03 PROBLEM — K57.30 DIVERTICULOSIS OF LARGE INTESTINE: Status: ACTIVE | Noted: 2020-03-03

## 2020-03-03 PROCEDURE — 99204 OFFICE O/P NEW MOD 45 MIN: CPT | Performed by: INTERNAL MEDICINE

## 2020-03-03 RX ORDER — AMITRIPTYLINE HYDROCHLORIDE 10 MG/1
10 TABLET, FILM COATED ORAL NIGHTLY PRN
Qty: 30 TABLET | Refills: 2 | Status: SHIPPED | OUTPATIENT
Start: 2020-03-03 | End: 2022-06-08 | Stop reason: SDUPTHER

## 2020-03-03 RX ORDER — ATORVASTATIN CALCIUM 40 MG/1
40 TABLET, FILM COATED ORAL NIGHTLY
Qty: 30 TABLET | Refills: 2 | Status: SHIPPED | OUTPATIENT
Start: 2020-03-03 | End: 2021-01-21

## 2020-03-03 NOTE — PROGRESS NOTES
"Chief Complaint   Patient presents with   • Prolactin Increase     New Patient        Referring Provider  Sg Mcgovern MD     HPI   Harish Anaya is a 49 y.o. male had concerns including Prolactin Increase (New Patient).      Has had fatigue for 5 years or so. Thought was in part due to prediabetes.    In the workup for fatigue he was diagnosed with low testosterone and elevated prolactin.   Prolactin level when first checked was slightly elevated at 34 and on recheck a few weeks later was 115.  My the pituitary completed on 2/27/2020 confirmed a 5 mm pituitary microadenoma.    Urology started him on testosterone recently. Since being started on the testosterone therapy he has noted some increase in anxiousness. This has fluctuated - comes on for a few hours and resolves spontaneously. His last injection was on Tuesday. He still has fatigue but feels anxious internally. He was started on a dose of 400 mg every 2 weeks.     In 2016/2017 he developed some symptoms of brain fog and head pressure. He has noted some breast soreness, no breast discharge or galactorrhea.   Also has some knee pain - joint pains.     Weigh:t over the last several years if he wants to lose weight he has to exercise strenuously and \"almost starve\". He has done atkins/keto. For the past 6 weeks he has been on a vegan diet and he hasn't lost any weight.     He has prediabetes - was on metformin in the past.     He also has suspected sleep apnea - snores at night. Hasn't been screened for CONRAD but has been referred to a sleep specialist.    Past Medical History:   Diagnosis Date   • Allergic rhinitis    • BMI 38.0-38.9,adult    • Diabetes mellitus (CMS/HCC)     borderline   • Diverticulosis of colon without hemorrhage    • Dizziness    • Heartburn    • Hyperlipidemia    • Malaise and fatigue    • Polyuria    • PONV (postoperative nausea and vomiting)    • Sore throat    • Vegetarian diet     since 3 wk ago     Past Surgical History: "   Procedure Laterality Date   • APPENDECTOMY     • CHOLECYSTECTOMY     • COLONOSCOPY      complete    • COLONOSCOPY N/A 1/17/2020    Procedure: COLONOSCOPY;  Surgeon: Ruddy Lara MD;  Location: [facility] ENDOSCOPY;  Service: Gastroenterology   • ELBOW PROCEDURE        Family History   Problem Relation Age of Onset   • Depression Mother    • Cancer Maternal Grandmother    • Melanoma Maternal Grandmother    • Parkinsonism Maternal Grandfather    • Diabetes Maternal Aunt    • Alcohol abuse Maternal Uncle    • Liver disease Maternal Uncle    • Colon cancer Neg Hx    • Cirrhosis Neg Hx    • Liver cancer Neg Hx       Social History     Socioeconomic History   • Marital status:      Spouse name: Not on file   • Number of children: Not on file   • Years of education: Not on file   • Highest education level: Not on file   Tobacco Use   • Smoking status: Former Smoker   • Smokeless tobacco: Never Used   Substance and Sexual Activity   • Alcohol use: No   • Drug use: No   • Sexual activity: Defer      No Known Allergies   Current Outpatient Medications on File Prior to Visit   Medication Sig Dispense Refill   • amitriptyline (ELAVIL) 10 MG tablet Take 1 tablet by mouth At Night As Needed for Sleep. 30 tablet 2   • Ascorbic Acid (VITAMIN C GUMMIE PO) Take  by mouth Daily.     • atorvastatin (LIPITOR) 40 MG tablet Take 1 tablet by mouth Every Night. 30 tablet 2   • COENZYME Q10 PO Take 1 tablet by mouth Daily.     • cyanocobalamin ([employer] VITAMIN B-12) 1000 MCG tablet Take 1 tablet by mouth Daily. 30 tablet 2   • Multiple Vitamins-Minerals (MULTIVITAMIN WITH MINERALS) tablet tablet Take 1 tablet by mouth 2 (Two) Times a Day.     • Probiotic capsule Take 1 capsule by mouth Daily. 30 capsule 1   • [DISCONTINUED] Testosterone Cypionate (DEPO-TESTOSTERONE) 200 MG/ML injection Inject 2 mL into the appropriate muscle as directed by prescriber Every 14 (Fourteen) Days. 4 mL 2     No current facility-administered medications on  "file prior to visit.         Review of Systems   Constitutional: Positive for fatigue and unexpected weight gain.   HENT: Negative.    Eyes: Negative.    Respiratory: Negative.    Cardiovascular: Negative.    Gastrointestinal: Negative.    Endocrine: Negative.    Genitourinary: Negative.    Musculoskeletal: Negative.    Skin: Negative.    Allergic/Immunologic: Negative.    Neurological: Positive for weakness. Negative for headache.   Hematological: Negative.    Psychiatric/Behavioral: Positive for sleep disturbance. The patient is nervous/anxious.       ROS was reviewed and verified. All other ROS negative.    /80 (BP Location: Right arm, Patient Position: Sitting, Cuff Size: Adult)   Pulse 71   Ht 182.9 cm (72\")   Wt (!) 141 kg (310 lb 12.8 oz)   SpO2 99%   BMI 42.15 kg/m²      Physical Exam     Constitutional:  well developed; well nourished  no acute distress  obese - Body mass index is 42.15 kg/m².   ENT/Thyroid: no thyromegaly  no palpable nodules   Eyes: EOM intact  Conjunctiva: clear   Respiratory:  breathing is unlabored  clear to auscultation bilaterally   Cardiovascular:  regular rate and rhythm, S1, S2 normal, no murmur, click, rub or gallop   Chest:  Not performed.   Abdomen: Not performed.   : Not performed.   Musculoskeletal: negative findings:  ROM of all joints is normal, no deformities present   Skin: dry and warm   Neuro: normal without focal findings, mental status, speech normal, alert and oriented x3 and DIEGO   Psych: oriented to time, place and person, mood and affect are within normal limits     Labs/Imaging    Lab on 02/22/2020   Component Date Value Ref Range Status   • Methylmalonic Acid 02/22/2020 157  0 - 378 nmol/L Final   • Disclaimer: 02/22/2020 Comment   Final    This test was developed and its performance characteristics  determined by [facility]. It has not been cleared or approved  by the Food and Drug Administration.   • Homocysteine, Plasma (Quant) 02/22/2020 14.4  0.0 " - 15.0 umol/L Final   • Prolactin 02/22/2020 115.00* 4.04 - 15.20 ng/mL Final   Office Visit on 01/23/2020   Component Date Value Ref Range Status   • PSA 01/23/2020 0.082  0.000 - 4.000 ng/mL Final    Results may be falsely decreased if patient taking Biotin.   • Estradiol 01/23/2020 23.3  7.6 - 42.6 pg/mL Final    Roche ECLIA methodology   • LH 01/23/2020 2.3  1.7 - 8.6 mIU/mL Final   • Prolactin 01/23/2020 34.9* 4.0 - 15.2 ng/mL Final   • Testosterone, Total 01/23/2020 34.5* 264.0 - 916.0 ng/dL Final    Comment: This [facility] LC/MS-MS method is currently certified by the CDC  Hormone Standardization Program (HoSt). Adult male reference  interval is based on a population of healthy nonobese males  (BMI <30) between 19 and 39 years old. Waleska, et.al. JCEM  2017,102;6203-6600. PMID: 24827740.     • Testosterone, Free 01/23/2020 2.7* 6.8 - 21.5 pg/mL Final   Admission on 01/17/2020, Discharged on 01/17/2020   Component Date Value Ref Range Status   • Case Report 01/17/2020    Final                    Value:Surgical Pathology Report                         Case: [id number]                                  Authorizing Provider:  Ruddy Lara MD         Collected:           01/17/2020 09:28 AM          Ordering Location:     [facility]    Received:            01/17/2020 12:24 PM                                 [facility]                                                                    Pathologist:           Thomas Abrams MD                                                             Specimens:   1) - Large Intestine, Transverse Colon                                                              2) - Large Intestine, Left / Descending Colon                                                       3) - Large Intestine, Rectum, area of previous resection biopsy                                     4) - Large Intestine, Cecum                                                                         5) - Large  Intestine, Right / Ascending Colon                                                                                 6) - Anus, polypoid area - HPV P16                                                        • Final Diagnosis 01/17/2020    Final                    Value:This result contains rich text formatting which cannot be displayed here.   Office Visit on 01/10/2020   Component Date Value Ref Range Status   • Hemoglobin A1C 01/10/2020 5.8  % Final   • Glucose 01/11/2020 120* 65 - 99 mg/dL Final   • BUN 01/11/2020 11  6 - 20 mg/dL Final   • Creatinine 01/11/2020 0.95  0.76 - 1.27 mg/dL Final   • Sodium 01/11/2020 142  136 - 145 mmol/L Final   • Potassium 01/11/2020 4.6  3.5 - 5.2 mmol/L Final   • Chloride 01/11/2020 104  98 - 107 mmol/L Final   • CO2 01/11/2020 25.8  22.0 - 29.0 mmol/L Final   • Calcium 01/11/2020 9.1  8.6 - 10.5 mg/dL Final   • Total Protein 01/11/2020 7.1  6.0 - 8.5 g/dL Final   • Albumin 01/11/2020 4.60  3.50 - 5.20 g/dL Final   • ALT (SGPT) 01/11/2020 30  1 - 41 U/L Final   • AST (SGOT) 01/11/2020 18  1 - 40 U/L Final   • Alkaline Phosphatase 01/11/2020 68  39 - 117 U/L Final   • Total Bilirubin 01/11/2020 0.3  0.2 - 1.2 mg/dL Final   • eGFR Non African Amer 01/11/2020 85  >60 mL/min/1.73 Final   • Globulin 01/11/2020 2.5  gm/dL Final   • A/G Ratio 01/11/2020 1.8  g/dL Final   • BUN/Creatinine Ratio 01/11/2020 11.6  7.0 - 25.0 Final   • Anion Gap 01/11/2020 12.2  5.0 - 15.0 mmol/L Final   • Total Cholesterol 01/11/2020 225* 0 - 200 mg/dL Final   • Triglycerides 01/11/2020 95  0 - 150 mg/dL Final   • HDL Cholesterol 01/11/2020 32* 40 - 60 mg/dL Final   • LDL Cholesterol  01/11/2020 174* 0 - 100 mg/dL Final   • VLDL Cholesterol 01/11/2020 19  5 - 40 mg/dL Final   • LDL/HDL Ratio 01/11/2020 5.44   Final   • Vitamin B-12 01/11/2020 283  211 - 946 pg/mL Final   • TSH 01/11/2020 1.400  0.270 - 4.200 uIU/mL Final   • 25 Hydroxy, Vitamin D 01/11/2020 26.1* 30.0 - 100.0 ng/ml Final   • Creatine Kinase  01/11/2020 114  20 - 200 U/L Final   • Testosterone, Total 01/11/2020 59* 264 - 916 ng/dL Final    Adult male reference interval is based on a population of  healthy nonobese males (BMI <30) between 19 and 39 years old.  Waleska et.al. JCEM 2017,102;3235-6426. PMID: [id number].   • Testosterone, Free 01/11/2020 4.0* 6.8 - 21.5 pg/mL Final   • WBC 01/11/2020 6.53  3.40 - 10.80 10*3/mm3 Final   • RBC 01/11/2020 4.88  4.14 - 5.80 10*6/mm3 Final   • Hemoglobin 01/11/2020 14.7  13.0 - 17.7 g/dL Final   • Hematocrit 01/11/2020 44.0  37.5 - 51.0 % Final   • MCV 01/11/2020 90.2  79.0 - 97.0 fL Final   • MCH 01/11/2020 30.1  26.6 - 33.0 pg Final   • MCHC 01/11/2020 33.4  31.5 - 35.7 g/dL Final   • RDW 01/11/2020 12.6  12.3 - 15.4 % Final   • RDW-SD 01/11/2020 41.6  37.0 - 54.0 fl Final   • MPV 01/11/2020 12.1* 6.0 - 12.0 fL Final   • Platelets 01/11/2020 226  140 - 450 10*3/mm3 Final   • Neutrophil % 01/11/2020 64.6  42.7 - 76.0 % Final   • Lymphocyte % 01/11/2020 23.1  19.6 - 45.3 % Final   • Monocyte % 01/11/2020 10.3  5.0 - 12.0 % Final   • Eosinophil % 01/11/2020 1.2  0.3 - 6.2 % Final   • Basophil % 01/11/2020 0.5  0.0 - 1.5 % Final   • Immature Grans % 01/11/2020 0.3  0.0 - 0.5 % Final   • Neutrophils, Absolute 01/11/2020 4.22  1.70 - 7.00 10*3/mm3 Final   • Lymphocytes, Absolute 01/11/2020 1.51  0.70 - 3.10 10*3/mm3 Final   • Monocytes, Absolute 01/11/2020 0.67  0.10 - 0.90 10*3/mm3 Final   • Eosinophils, Absolute 01/11/2020 0.08  0.00 - 0.40 10*3/mm3 Final   • Basophils, Absolute 01/11/2020 0.03  0.00 - 0.20 10*3/mm3 Final   • Immature Grans, Absolute 01/11/2020 0.02  0.00 - 0.05 10*3/mm3 Final   • nRBC 01/11/2020 0.0  0.0 - 0.2 /100 WBC Final       PROCEDURE: MRI BRAIN W WO CONTRAST-     HISTORY: Neuroendocrine sign / symptoms; E22.1-Hyperprolactinemia     PROCEDURE: Multiplanar multisequence imaging of the brain was performed  both before and following the administration of 15 mL MultiHance  intravenous contrast.      COMPARISON: None.     FINDINGS: There are no significant white matter abnormalities. There is  no mass, mass effect or midline shift. There is no hydrocephalus. There  are no areas of restricted diffusion. There is no pathologic contrast  enhancement.     The midbrain, yasmin, cerebellum and craniocervical junction are  unremarkable. Dedicated imaging of the sella and pituitary gland  demonstrates a 5 mm nonenhancing lesion in the left aspects of the  pituitary gland consistent with a microadenoma. The major intracranial  vasculature demonstrates the expected flow related signal. The paranasal  sinuses are clear.     IMPRESSION:  Findings consistent with a 5 mm pituitary microadenoma in  the left aspects of the pituitary gland.           This report was finalized on 2/28/2020 7:35 AM by April Nagy M.D.      Assessment and Plan    Harish was seen today for prolactin increase.    Diagnoses and all orders for this visit:    Hyperprolactinemia (CMS/HCC)  Most recent prolactin level on 2/22/2020 was elevated at 115.  Consistent with prolactinoma with a 5 mm pituitary microadenoma noted on MRI.  Recheck level today as the two levels checked between January and February were quite different.  Once prolactin level has resulted, plan to initiate cabergoline twice weekly.  Discussed potential side effects including fatigue, malaise, nausea, headache.  Recheck prolactin level 1 month after cabergoline has been started.  Repeat MRI in 6 to 12 months depending on prolactin response.    Pituitary microadenoma (CMS/HCC)  MRI from 2/27/2020 shows a 5 mm pituitary microadenoma.  This is most likely a prolactinoma, but we need to screen other pituitary axes to ensure there isn't co-secretion of another hormone or alternatively another pituitary deficiency.  He does have hypogonadotrophic hypogonadism, which is due in part to hyperprolactinemia. TSH was normal, but free T4 also should be checked due to pituitary  abnormality.  -     TSH; Future  -     T4, Free; Future  -     Prolactin; Future  -     Insulin-like Growth Factor; Future  -     Cortisol - AM; Future  -     ACTH; Future    Hypogonadism in male  Hypogonadotrophic hypogonadism confirmed with morning testosterone level from 1/11/2020 of 59.  The recheck was unfortunately done in the afternoon, but despite that was still quite low at 34.  There are multiple factors contributing to hypogonadism including obesity, untreated CONRAD, hyperprolactinemia.  I have recommended that he discontinue testosterone therapy as hyperprolactinemia should be treated as this is a known cause for hypogonadism.  He also needs to seek treatment for sleep apnea and continue efforts at weight loss, all of which should improve testosterone levels.    I will monitor the testosterone levels and if they do not return to normal, treatment will be considered at that time.  Patient and his wife are in agreement with the plan.    Morbid obesity (CMS/formerly [facility])  BMI 42.2.  Screening additional labs as above.  Once CONRAD treated and hypogonadism improved or treated, he should have some success with weight loss as long as he is maintaining a calorie deficit.    Suspected sleep apnea  Patient snores at night and has daytime fatigue.  He needs a sleep evaluation.  Untreated sleep apnea increases risk of cardiovascular disease, stroke, insulin resistance, difficulty with weight loss, hypogonadism.       Return in about 3 months (around 6/3/2020) for next scheduled follow up. The patient was instructed to contact the clinic with any interval questions or concerns.    Elma Thomas, DO   Endocrinologist    Please note that portions of this document were completed with a voice recognition program. Efforts were made to edit the dictations, but occasionally words are mis-transcribed.  Answers for HPI/ROS submitted by the patient on 3/3/2020   What is the primary reason for your visit?: Other  Please describe your  symptoms.: Fatigue , high prolactin  Have you had these symptoms before?: Yes  How long have you been having these symptoms?: 1-4 weeks ago

## 2020-03-04 ENCOUNTER — LAB (OUTPATIENT)
Dept: LAB | Facility: HOSPITAL | Age: 50
End: 2020-03-04

## 2020-03-04 DIAGNOSIS — D35.2 PITUITARY MICROADENOMA (HCC): ICD-10-CM

## 2020-03-04 LAB
CORTIS SERPL-MCNC: 5 MCG/DL
PROLACTIN SERPL-MCNC: 126 NG/ML (ref 4.04–15.2)
T4 FREE SERPL-MCNC: 0.96 NG/DL (ref 0.93–1.7)
TSH SERPL DL<=0.05 MIU/L-ACNC: 0.74 UIU/ML (ref 0.27–4.2)

## 2020-03-04 PROCEDURE — 82024 ASSAY OF ACTH: CPT

## 2020-03-04 PROCEDURE — 84439 ASSAY OF FREE THYROXINE: CPT

## 2020-03-04 PROCEDURE — 84146 ASSAY OF PROLACTIN: CPT

## 2020-03-04 PROCEDURE — 82533 TOTAL CORTISOL: CPT

## 2020-03-04 PROCEDURE — 84305 ASSAY OF SOMATOMEDIN: CPT

## 2020-03-04 PROCEDURE — 84443 ASSAY THYROID STIM HORMONE: CPT

## 2020-03-05 DIAGNOSIS — E22.1 HYPERPROLACTINEMIA (HCC): Primary | ICD-10-CM

## 2020-03-05 LAB — ACTH PLAS-MCNC: 32.7 PG/ML (ref 7.2–63.3)

## 2020-03-05 RX ORDER — COSYNTROPIN 0.25 MG/ML
0.25 INJECTION, POWDER, FOR SOLUTION INTRAMUSCULAR; INTRAVENOUS ONCE
Status: COMPLETED | OUTPATIENT
Start: 2020-03-06 | End: 2020-03-18

## 2020-03-05 RX ORDER — CABERGOLINE 0.5 MG/1
0.25 TABLET ORAL 2 TIMES WEEKLY
Qty: 8 TABLET | Refills: 5 | Status: SHIPPED | OUTPATIENT
Start: 2020-03-05 | End: 2020-06-03 | Stop reason: SDUPTHER

## 2020-03-06 LAB — IGF-I SERPL-MCNC: 125 NG/ML (ref 67–205)

## 2020-03-18 ENCOUNTER — LAB (OUTPATIENT)
Dept: ENDOCRINOLOGY | Facility: CLINIC | Age: 50
End: 2020-03-18
Payer: COMMERCIAL

## 2020-03-18 ENCOUNTER — CLINICAL SUPPORT (OUTPATIENT)
Dept: ENDOCRINOLOGY | Facility: CLINIC | Age: 50
End: 2020-03-18

## 2020-03-18 DIAGNOSIS — R53.82 CHRONIC FATIGUE: ICD-10-CM

## 2020-03-18 LAB
CORTIS SERPL-MCNC: 16.42 MCG/DL
CORTIS SERPL-MCNC: 20.71 MCG/DL
CORTIS SERPL-MCNC: 6.8 MCG/DL

## 2020-03-18 PROCEDURE — 82533 TOTAL CORTISOL: CPT | Performed by: INTERNAL MEDICINE

## 2020-03-18 PROCEDURE — 96372 THER/PROPH/DIAG INJ SC/IM: CPT | Performed by: INTERNAL MEDICINE

## 2020-03-18 RX ADMIN — COSYNTROPIN 0.25 MG: 0.25 INJECTION, POWDER, FOR SOLUTION INTRAMUSCULAR; INTRAVENOUS at 08:36

## 2020-04-17 ENCOUNTER — TELEPHONE (OUTPATIENT)
Dept: INTERNAL MEDICINE | Facility: CLINIC | Age: 50
End: 2020-04-17

## 2020-04-17 DIAGNOSIS — B35.4 RINGWORM OF BODY: Primary | ICD-10-CM

## 2020-04-17 RX ORDER — CLOTRIMAZOLE 1 %
CREAM (GRAM) TOPICAL 2 TIMES DAILY
Qty: 28 G | Refills: 0 | Status: SHIPPED | OUTPATIENT
Start: 2020-04-17 | End: 2020-06-03

## 2020-04-17 NOTE — TELEPHONE ENCOUNTER
PT'S WIFE SARA STATED THAT THE PT HAS A BUMP ON HIS BACK AND IT CONTINUES TO GET BIGGER EACH DAY. STARTED OUT VERY SMALL ON Sunday AND NOW IT IS THE SIZE OF THE PALM OF HIS HAND. IT IS A SOLID RAISED WHELP, HOT TO TOUCH AND PAINFUL BUT NOT ITCHY.     CALL BACK [phone]. PT IS AT WORK TODAY BUT IF YOU DON'T REACH HIM PLEASE CALL HIS WIFE [phone]    SAM POWELL

## 2020-04-17 NOTE — TELEPHONE ENCOUNTER
Patient sent photo of a lesion and I sent in cream.  Video visit may be appropriate.  Please check with patient.

## 2020-05-21 ENCOUNTER — TELEPHONE (OUTPATIENT)
Dept: INTERNAL MEDICINE | Facility: CLINIC | Age: 50
End: 2020-05-21

## 2020-05-28 DIAGNOSIS — R73.03 BORDERLINE DIABETES: ICD-10-CM

## 2020-05-28 DIAGNOSIS — E78.5 HYPERLIPIDEMIA, UNSPECIFIED HYPERLIPIDEMIA TYPE: Primary | ICD-10-CM

## 2020-05-29 DIAGNOSIS — E78.5 HYPERLIPIDEMIA, UNSPECIFIED HYPERLIPIDEMIA TYPE: Primary | ICD-10-CM

## 2020-05-29 DIAGNOSIS — R73.03 BORDERLINE DIABETES: ICD-10-CM

## 2020-05-30 ENCOUNTER — LAB (OUTPATIENT)
Dept: LAB | Facility: HOSPITAL | Age: 50
End: 2020-05-30

## 2020-05-30 DIAGNOSIS — E22.1 HYPERPROLACTINEMIA (HCC): ICD-10-CM

## 2020-05-30 DIAGNOSIS — R73.03 BORDERLINE DIABETES: ICD-10-CM

## 2020-05-30 DIAGNOSIS — E78.5 HYPERLIPIDEMIA, UNSPECIFIED HYPERLIPIDEMIA TYPE: ICD-10-CM

## 2020-05-30 LAB
ALBUMIN SERPL-MCNC: 4.7 G/DL (ref 3.5–5.2)
ALBUMIN/GLOB SERPL: 2 G/DL
ALP SERPL-CCNC: 77 U/L (ref 39–117)
ALT SERPL W P-5'-P-CCNC: 35 U/L (ref 1–41)
ANION GAP SERPL CALCULATED.3IONS-SCNC: 10.8 MMOL/L (ref 5–15)
AST SERPL-CCNC: 23 U/L (ref 1–40)
BASOPHILS # BLD AUTO: 0.04 10*3/MM3 (ref 0–0.2)
BASOPHILS NFR BLD AUTO: 0.7 % (ref 0–1.5)
BILIRUB SERPL-MCNC: 0.7 MG/DL (ref 0.2–1.2)
BUN BLD-MCNC: 9 MG/DL (ref 6–20)
BUN/CREAT SERPL: 10 (ref 7–25)
CALCIUM SPEC-SCNC: 9.4 MG/DL (ref 8.6–10.5)
CHLORIDE SERPL-SCNC: 104 MMOL/L (ref 98–107)
CHOLEST SERPL-MCNC: 136 MG/DL (ref 0–200)
CO2 SERPL-SCNC: 26.2 MMOL/L (ref 22–29)
CREAT BLD-MCNC: 0.9 MG/DL (ref 0.76–1.27)
DEPRECATED RDW RBC AUTO: 41.7 FL (ref 37–54)
EOSINOPHIL # BLD AUTO: 0.08 10*3/MM3 (ref 0–0.4)
EOSINOPHIL NFR BLD AUTO: 1.4 % (ref 0.3–6.2)
ERYTHROCYTE [DISTWIDTH] IN BLOOD BY AUTOMATED COUNT: 12.4 % (ref 12.3–15.4)
GFR SERPL CREATININE-BSD FRML MDRD: 90 ML/MIN/1.73
GLOBULIN UR ELPH-MCNC: 2.3 GM/DL
GLUCOSE BLD-MCNC: 120 MG/DL (ref 65–99)
HBA1C MFR BLD: 6.3 % (ref 4.8–5.6)
HCT VFR BLD AUTO: 45.8 % (ref 37.5–51)
HDLC SERPL-MCNC: 32 MG/DL (ref 40–60)
HGB BLD-MCNC: 15.3 G/DL (ref 13–17.7)
IMM GRANULOCYTES # BLD AUTO: 0.02 10*3/MM3 (ref 0–0.05)
IMM GRANULOCYTES NFR BLD AUTO: 0.3 % (ref 0–0.5)
LDLC SERPL CALC-MCNC: 85 MG/DL (ref 0–100)
LDLC/HDLC SERPL: 2.64 {RATIO}
LYMPHOCYTES # BLD AUTO: 1.44 10*3/MM3 (ref 0.7–3.1)
LYMPHOCYTES NFR BLD AUTO: 24.9 % (ref 19.6–45.3)
MCH RBC QN AUTO: 30.2 PG (ref 26.6–33)
MCHC RBC AUTO-ENTMCNC: 33.4 G/DL (ref 31.5–35.7)
MCV RBC AUTO: 90.5 FL (ref 79–97)
MONOCYTES # BLD AUTO: 0.59 10*3/MM3 (ref 0.1–0.9)
MONOCYTES NFR BLD AUTO: 10.2 % (ref 5–12)
NEUTROPHILS # BLD AUTO: 3.61 10*3/MM3 (ref 1.7–7)
NEUTROPHILS NFR BLD AUTO: 62.5 % (ref 42.7–76)
NRBC BLD AUTO-RTO: 0 /100 WBC (ref 0–0.2)
PLATELET # BLD AUTO: 210 10*3/MM3 (ref 140–450)
PMV BLD AUTO: 11.5 FL (ref 6–12)
POTASSIUM BLD-SCNC: 4.5 MMOL/L (ref 3.5–5.2)
PROLACTIN SERPL-MCNC: 0.7 NG/ML (ref 4.04–15.2)
PROT SERPL-MCNC: 7 G/DL (ref 6–8.5)
RBC # BLD AUTO: 5.06 10*6/MM3 (ref 4.14–5.8)
SODIUM BLD-SCNC: 141 MMOL/L (ref 136–145)
TRIGL SERPL-MCNC: 97 MG/DL (ref 0–150)
VLDLC SERPL-MCNC: 19.4 MG/DL (ref 5–40)
WBC NRBC COR # BLD: 5.78 10*3/MM3 (ref 3.4–10.8)

## 2020-05-30 PROCEDURE — 80053 COMPREHEN METABOLIC PANEL: CPT

## 2020-05-30 PROCEDURE — 83036 HEMOGLOBIN GLYCOSYLATED A1C: CPT

## 2020-05-30 PROCEDURE — 80061 LIPID PANEL: CPT

## 2020-05-30 PROCEDURE — 84146 ASSAY OF PROLACTIN: CPT

## 2020-05-30 PROCEDURE — 36415 COLL VENOUS BLD VENIPUNCTURE: CPT

## 2020-05-30 PROCEDURE — 85025 COMPLETE CBC W/AUTO DIFF WBC: CPT

## 2020-06-03 ENCOUNTER — TELEMEDICINE (OUTPATIENT)
Dept: ENDOCRINOLOGY | Facility: CLINIC | Age: 50
End: 2020-06-03

## 2020-06-03 VITALS — WEIGHT: 299 LBS | HEIGHT: 72 IN | BODY MASS INDEX: 40.5 KG/M2

## 2020-06-03 DIAGNOSIS — R29.818 SUSPECTED SLEEP APNEA: ICD-10-CM

## 2020-06-03 DIAGNOSIS — E22.1 HYPERPROLACTINEMIA (HCC): Primary | ICD-10-CM

## 2020-06-03 DIAGNOSIS — R73.03 PREDIABETES: ICD-10-CM

## 2020-06-03 DIAGNOSIS — E29.1 HYPOGONADISM IN MALE: ICD-10-CM

## 2020-06-03 DIAGNOSIS — D35.2 PITUITARY MICROADENOMA (HCC): ICD-10-CM

## 2020-06-03 DIAGNOSIS — E66.01 MORBID OBESITY (HCC): ICD-10-CM

## 2020-06-03 PROCEDURE — 99214 OFFICE O/P EST MOD 30 MIN: CPT | Performed by: INTERNAL MEDICINE

## 2020-06-03 RX ORDER — CABERGOLINE 0.5 MG/1
0.25 TABLET ORAL
Qty: 4 TABLET | Refills: 5 | Status: SHIPPED | OUTPATIENT
Start: 2020-06-03 | End: 2020-09-09 | Stop reason: SDUPTHER

## 2020-06-03 NOTE — PROGRESS NOTES
"Chief Complaint   Patient presents with   • Prolactinoma     follow-up        HPI   Harish Anaya is a 50 y.o. male had concerns including Prolactinoma (follow-up).      Visit was conducted via telemedicine. Consent was obtained from the patient to conduct a video visit.    Pt was started on cabergoline 0.25 mg twice a week. Prolactin level has improved from 100s to 0.7.    He is feeling better overall. Still has some days that he is fatigued.     He has noted improvement significant improvement in his stamina for physical activity.     He is no longer experiencing anxiousness as he had after the high testosterone doses.   Libido has also improved. He denies any ED.     He is following a vegan diet and lipids have improved.   A1c has increased to 6.3 as he has been eating a few more carbs.   In the past was on metformin and tolerated without difficulty.   Weight is down from our last measurement though he feels like he hasn't lost weight.     He hasn't been screened for sleep apnea. He has a referral but hasn't gone.     The following portions of the patient's history were reviewed and updated as appropriate: allergies, current medications, past family history, past medical history, past social history, past surgical history and problem list.    Review of Systems   Constitutional: Positive for fatigue.   HENT: Negative.    Eyes: Negative.    Respiratory: Negative.    Cardiovascular: Negative.    Gastrointestinal: Negative.    Endocrine: Negative.    Genitourinary: Negative.    Musculoskeletal: Negative.    Skin: Negative.    Allergic/Immunologic: Negative.    Neurological: Negative.    Hematological: Negative.    Psychiatric/Behavioral: Negative.       ROS was reviewed and verified. All other ROS negative.    Ht 182.9 cm (72\")   Wt 136 kg (299 lb)   BMI 40.55 kg/m²      Constitutional:  no acute distress   ENT/Thyroid: Not examined    Eyes: Not examined    Respiratory:  No conversational dyspnea  "   Cardiovascular:  Not performed.   Chest:  Not performed.   Abdomen: Not performed.   : Not performed.   Musculoskeletal: Not examined   Skin: not performed.   Neuro: mental status, speech normal, alert and oriented x3   Psych: oriented to time, place and person, mood and affect are within normal limits     CMP:  Lab Results   Component Value Date     (H) 03/16/2017    BUN 9 05/30/2020    CREATININE 0.90 05/30/2020    EGFRIFNONA 90 05/30/2020    EGFRIFAFRI 88 03/16/2017    BCR 10.0 05/30/2020     05/30/2020    K 4.5 05/30/2020    CO2 26.2 05/30/2020    CALCIUM 9.4 05/30/2020    PROTENTOTREF 7.4 03/16/2017    ALBUMIN 4.70 05/30/2020    LABGLOBREF 2.7 03/16/2017    LABIL2 1.7 03/16/2017    BILITOT 0.7 05/30/2020    ALKPHOS 77 05/30/2020    AST 23 05/30/2020    ALT 35 05/30/2020     Lipid Panel:  Lab Results   Component Value Date    CHOL 136 05/30/2020    TRIG 97 05/30/2020    HDL 32 (L) 05/30/2020    VLDL 19.4 05/30/2020    LDL 85 05/30/2020     HbA1c:  Lab Results   Component Value Date    HGBA1C 6.30 (H) 05/30/2020    HGBA1C 5.8 01/10/2020     TSH:  Lab Results   Component Value Date    TSH 0.737 03/04/2020     Lab Results   Component Value Date    PROLACTIN 0.70 (L) 05/30/2020    PROLACTIN 126.00 (H) 03/04/2020    PROLACTIN 115.00 (H) 02/22/2020     Lab Results   Component Value Date    TESTOSTERONE 34.5 (L) 01/23/2020       Assessment and Plan    Harish was seen today for prolactinoma.    Diagnoses and all orders for this visit:    Hyperprolactinemia (CMS/HCC)  Improved on 0.25 mg twice weekly.  Decrease dose to 0.25 mg weekly.  Recheck level in 3 months.  -     Prolactin; Future  -     cabergoline (DOSTINEX) 0.5 MG tablet; Take 0.5 tablets by mouth Every 7 (Seven) Days.    Pituitary microadenoma (CMS/HCC)  MRI from February 2020 confirmed a 5 mm pituitary microadenoma.  Repeat MRI in August.  -     MRI Pituitary With & Without Contrast; Future    Hypogonadism in male  Suspected multifactorial  due to obesity, suspected sleep apnea, hyperprolactinemia.  Recheck level in 3 months as prolactin levels are now low.  Libido and fatigue improved since cabergoline was started.  -     Testosterone, Free, Total; Future    Prediabetes  A1c has trended up to 6.3.  Discussed the option of adding back metformin as he has been on in the past though due to recent recalls and patient plans for lifestyle modifications, will hold off for now.    Morbid obesity (CMS/[facility])  BMI 40.6.  5 to 10% or more weight loss is recommended for management of glucose levels and overall health.  Discussed decreasing calorie intake, intermittent fasting, increasing exercise.    Suspected sleep apnea  Untreated sleep apnea increases insulin resistance, makes weight loss more difficult, increases cardiovascular and stroke risk.  Sleep evaluation recommended.  Patient has a referral but has not scheduled.       Return in about 3 months (around 9/3/2020) for next scheduled follow up. The patient was instructed to contact the clinic with any interval questions or concerns.    Elma Thomas, DO   Endocrinologist    Please note that portions of this document were completed with a voice recognition program. Efforts were made to edit the dictations, but occasionally words are mis-transcribed.

## 2020-06-05 ENCOUNTER — TELEMEDICINE (OUTPATIENT)
Dept: INTERNAL MEDICINE | Facility: CLINIC | Age: 50
End: 2020-06-05

## 2020-06-05 DIAGNOSIS — E66.9 OBESITY, UNSPECIFIED CLASSIFICATION, UNSPECIFIED OBESITY TYPE, UNSPECIFIED WHETHER SERIOUS COMORBIDITY PRESENT: ICD-10-CM

## 2020-06-05 DIAGNOSIS — Z11.59 ENCOUNTER FOR HEPATITIS C SCREENING TEST FOR LOW RISK PATIENT: Primary | ICD-10-CM

## 2020-06-05 DIAGNOSIS — E78.49 OTHER HYPERLIPIDEMIA: ICD-10-CM

## 2020-06-05 DIAGNOSIS — R73.03 BORDERLINE DIABETES: ICD-10-CM

## 2020-06-05 PROCEDURE — 99214 OFFICE O/P EST MOD 30 MIN: CPT | Performed by: INTERNAL MEDICINE

## 2020-06-05 NOTE — PROGRESS NOTES
No chief complaint on file.  CC: borderline diabetes    Subjective     History of Present Illness   Harish Anaya is a 50 y.o. male presenting for follow up on lab work.  Recent lab work presented elevated A1c of 6.3.  His prolactin levels have dropped to low levels and he continues to follow with endocrinology and was recently told to decrease his dose of bromocriptine.  He feels very well and states that overall he is happy with his current care.  He has switched over to a plant-based diet and his cholesterol levels have dropped significantly.  He unfortunately has not been able to lose much weight as his carbohydrate intake increased with this diet.    The following portions of the patient's history were reviewed and updated as appropriate: allergies, current medications, past family history, past medical history, past social history, past surgical history and problem list.    Review of Systems   Constitutional: Negative for chills, fatigue and fever.   HENT: Negative for congestion, ear pain, rhinorrhea, sinus pressure and sore throat.    Eyes: Negative for visual disturbance.   Respiratory: Negative for cough, chest tightness, shortness of breath and wheezing.    Cardiovascular: Negative for chest pain, palpitations and leg swelling.   Gastrointestinal: Negative for abdominal pain, blood in stool, constipation, diarrhea, nausea and vomiting.   Endocrine: Negative for polydipsia and polyuria.   Genitourinary: Negative for dysuria and hematuria.   Musculoskeletal: Negative for arthralgias and back pain.   Skin: Negative for rash.   Neurological: Negative for dizziness, light-headedness, numbness and headaches.   Psychiatric/Behavioral: Negative for dysphoric mood and sleep disturbance. The patient is not nervous/anxious.        No Known Allergies    Past Medical History:   Diagnosis Date   • Allergic rhinitis    • BMI 38.0-38.9,adult    • Diabetes mellitus (CMS/[facility])     borderline   • Diverticulosis of  colon without hemorrhage    • Dizziness    • Heartburn    • Hyperlipidemia    • Malaise and fatigue    • Polyuria    • PONV (postoperative nausea and vomiting)    • Sore throat    • Vegetarian diet     since 3 wk ago       Social History     Socioeconomic History   • Marital status:      Spouse name: Not on file   • Number of children: Not on file   • Years of education: Not on file   • Highest education level: Not on file   Tobacco Use   • Smoking status: Former Smoker   • Smokeless tobacco: Never Used   Substance and Sexual Activity   • Alcohol use: No   • Drug use: No   • Sexual activity: Defer        Past Surgical History:   Procedure Laterality Date   • APPENDECTOMY     • CHOLECYSTECTOMY     • COLONOSCOPY      complete    • COLONOSCOPY N/A 1/17/2020    Procedure: COLONOSCOPY;  Surgeon: Ruddy Lara MD;  Location: [facility];  Service: Gastroenterology   • ELBOW PROCEDURE         Family History   Problem Relation Age of Onset   • Depression Mother    • Cancer Maternal Grandmother    • Melanoma Maternal Grandmother    • Parkinsonism Maternal Grandfather    • Diabetes Maternal Aunt    • Alcohol abuse Maternal Uncle    • Liver disease Maternal Uncle    • Colon cancer Neg Hx    • Cirrhosis Neg Hx    • Liver cancer Neg Hx          Current Outpatient Medications:   •  amitriptyline (ELAVIL) 10 MG tablet, Take 1 tablet by mouth At Night As Needed for Sleep., Disp: 30 tablet, Rfl: 2  •  Ascorbic Acid (VITAMIN C GUMMIE PO), Take  by mouth Daily., Disp: , Rfl:   •  atorvastatin (LIPITOR) 40 MG tablet, Take 1 tablet by mouth Every Night., Disp: 30 tablet, Rfl: 2  •  cabergoline (DOSTINEX) 0.5 MG tablet, Take 0.5 tablets by mouth Every 7 (Seven) Days., Disp: 4 tablet, Rfl: 5  •  COENZYME Q10 PO, Take 1 tablet by mouth Daily., Disp: , Rfl:   •  Multiple Vitamins-Minerals (MULTIVITAMIN WITH MINERALS) tablet tablet, Take 1 tablet by mouth 2 (Two) Times a Day., Disp: , Rfl:   •  Probiotic capsule, Take 1 capsule  by mouth Daily., Disp: 30 capsule, Rfl: 1    Objective   There were no vitals taken for this visit.    Physical Exam   Constitutional: He is oriented to person, place, and time. He appears well-developed and well-nourished.   HENT:   Head: Normocephalic and atraumatic.   Eyes: Conjunctivae are normal.   Neck: Normal range of motion. Neck supple.   Pulmonary/Chest: Effort normal.   Musculoskeletal: Normal range of motion.   Neurological: He is alert and oriented to person, place, and time.   Skin: No rash noted.   Psychiatric: He has a normal mood and affect. His behavior is normal.   Nursing note and vitals reviewed.      Assessment/Plan   Diagnoses and all orders for this visit:    Encounter for hepatitis C screening test for low risk patient  -     Hepatitis Panel, Acute; Future    Borderline diabetes  -     Hemoglobin A1c; Future    Other hyperlipidemia  -     Lipid Panel; Future    Obesity, unspecified classification, unspecified obesity type, unspecified whether serious comorbidity present          Discussion Summary:  Patient is a 50 y.o. male presenting for follow up.    Borderline diabetes  -Patient counseled to reduce carbohydrate intake.  Try taking more protein and reducing portion sizes of meals.  Follow-up A1c in 3 months.    Obesity  - Weight loss options were discussed in detail including reducing fried, fatty, and sugary foods with diet control. A regular exercise regimen was discussed.  Patient's There is no height or weight on file to calculate BMI. BMI is above normal parameters. Recommendations include: exercise counseling and nutrition counseling.    Hyperlipidemia  -Consider reducing statin medications if patient has significant improvement in weight and glucose control.        Follow up:  Return in about 3 months (around 9/5/2020) for Annual physical.

## 2020-07-10 ENCOUNTER — OFFICE VISIT (OUTPATIENT)
Dept: PULMONOLOGY | Facility: CLINIC | Age: 50
End: 2020-07-10

## 2020-07-10 VITALS
WEIGHT: 304 LBS | BODY MASS INDEX: 41.17 KG/M2 | HEIGHT: 72 IN | SYSTOLIC BLOOD PRESSURE: 124 MMHG | RESPIRATION RATE: 18 BRPM | OXYGEN SATURATION: 97 % | HEART RATE: 70 BPM | DIASTOLIC BLOOD PRESSURE: 72 MMHG

## 2020-07-10 DIAGNOSIS — G47.19 EXCESSIVE DAYTIME SLEEPINESS: ICD-10-CM

## 2020-07-10 DIAGNOSIS — G47.33 OBSTRUCTIVE SLEEP APNEA: Primary | ICD-10-CM

## 2020-07-10 DIAGNOSIS — R06.83 SNORING: ICD-10-CM

## 2020-07-10 PROCEDURE — 99204 OFFICE O/P NEW MOD 45 MIN: CPT | Performed by: INTERNAL MEDICINE

## 2020-07-10 NOTE — PROGRESS NOTES
CONSULT NOTE    Requested by:  Matti Rivera DO      Chief Complaint   Patient presents with   • Consult   • Sleeping Problem       Subjective:  Harish Anaya is a 50 y.o. male.     History of Present Illness   Patient came in today for evaluation of possible sleep apnea. Patient endorses loud snoring. he also says that he has been tired and has fatigue during the day, for the past few years.      The patient says that he rarely gets restful night sleep and his quality has diminished considerably. he does have a tendency to feel sleepy while watching TV and reading a book.      The patient has been told that he also occasionally acts out his dreams.    he is complaining of occasional headaches. There is known family history of sleep apnea, in his mother.      his Epsworth Sleepiness score was 13 /24.     he drinks 2-3 cups/cans of caffeinated drinks per day.      The following portions of the patient's history were reviewed and updated as appropriate: allergies, current medications, past family history, past medical history, past social history and past surgical history.    Review of Systems   Constitutional: Negative for chills, fatigue and fever.   HENT: Negative for sinus pressure, sneezing and sore throat.    Respiratory: Positive for cough. Negative for chest tightness, shortness of breath and wheezing.    Cardiovascular: Negative for palpitations and leg swelling.   Psychiatric/Behavioral: Positive for sleep disturbance.   All other systems reviewed and are negative.      Past Medical History:   Diagnosis Date   • Allergic rhinitis    • BMI 38.0-38.9,adult    • Diabetes mellitus (CMS/HCC)     borderline   • Diverticulosis of colon without hemorrhage    • Dizziness    • Heartburn    • Hyperlipidemia    • Malaise and fatigue    • Polyuria    • PONV (postoperative nausea and vomiting)    • Sore throat    • Vegetarian diet     since 3 wk ago       Social History     Tobacco Use   • Smoking status:  "Former Smoker   • Smokeless tobacco: Never Used   Substance Use Topics   • Alcohol use: No         Objective:  Visit Vitals  /72   Pulse 70   Resp 18   Ht 182.9 cm (72.01\")   Wt (!) 138 kg (304 lb)   SpO2 97%   BMI 41.22 kg/m²       Physical Exam   Constitutional: He is oriented to person, place, and time. He appears well-developed and well-nourished.   HENT:   Head: Atraumatic.   Crowded Oropharynx.    Eyes: Pupils are equal, round, and reactive to light. EOM are normal.   Neck: No JVD present. No tracheal deviation present. No thyromegaly present.   Increased adipose tissue.    Cardiovascular: Normal rate and regular rhythm.   Pulmonary/Chest: Effort normal and breath sounds normal. No respiratory distress. He has no wheezes.   Musculoskeletal: Normal range of motion. He exhibits no edema.   Gait was normal.   Neurological: He is alert and oriented to person, place, and time.   Skin: Skin is warm and dry.   Psychiatric: He has a normal mood and affect. His behavior is normal.   Vitals reviewed.      Assessment/Plan:  Harish was seen today for consult and sleeping problem.    Diagnoses and all orders for this visit:    Obstructive sleep apnea  -     Home Sleep Study; Future    Excessive daytime sleepiness  -     Home Sleep Study; Future    Snoring  -     Home Sleep Study; Future        Return in about 14 weeks (around 10/16/2020) for Recheck, Monty/Monse, In [place], ....Also 7-8 mths w/ Dr. Rosales ([place]).    DISCUSSION(if any):  Laboratory workup was also reviewed which showed   Lab Results   Component Value Date    CO2 26.2 05/30/2020     ===========================  ===========================    Sleep questionnaire was reviewed with the patient    The pathophysiology of sleep apnea was discussed, with the patient.     We will encourage him to schedule the sleep study soon.     The patient was made aware of the limitation of the home sleep study, whereby it may underestimate the true AHI and also " carries a low sensitivity.  I have informed him that even if the home sleep study is negative, we may suggest an in lab sleep study to completely and definitively rule out/in sleep apnea.  The patient has understood.  This was communicated to the patient, in case home study is to be requested.    The patient is agreeable to try CPAP/BiPAP, if needed.     Patient was educated on good sleep hygiene measures and voiced understanding of the same.     Patient was given reading material regarding sleep apnea    Patient was counseled regarding weight loss.       Dictated utilizing Dragon dictation.    This document was electronically signed by Kristian Rosales MD on 07/10/20 at 09:25

## 2020-07-29 ENCOUNTER — HOSPITAL ENCOUNTER (OUTPATIENT)
Dept: SLEEP MEDICINE | Facility: HOSPITAL | Age: 50
Discharge: HOME | End: 2020-07-29
Admitting: INTERNAL MEDICINE

## 2020-07-29 DIAGNOSIS — G47.33 OBSTRUCTIVE SLEEP APNEA: ICD-10-CM

## 2020-07-29 DIAGNOSIS — G47.19 EXCESSIVE DAYTIME SLEEPINESS: ICD-10-CM

## 2020-07-29 DIAGNOSIS — R06.83 SNORING: ICD-10-CM

## 2020-07-29 PROCEDURE — 95806 SLEEP STUDY UNATT&RESP EFFT: CPT

## 2020-07-29 PROCEDURE — 95806 SLEEP STUDY UNATT&RESP EFFT: CPT | Performed by: INTERNAL MEDICINE

## 2020-08-04 DIAGNOSIS — G47.33 OSA (OBSTRUCTIVE SLEEP APNEA): Primary | ICD-10-CM

## 2020-08-27 ENCOUNTER — HOSPITAL ENCOUNTER (OUTPATIENT)
Dept: MRI IMAGING | Facility: HOSPITAL | Age: 50
Discharge: HOME | End: 2020-08-27
Admitting: INTERNAL MEDICINE

## 2020-08-27 DIAGNOSIS — D35.2 PITUITARY MICROADENOMA (HCC): ICD-10-CM

## 2020-08-27 PROCEDURE — 70553 MRI BRAIN STEM W/O & W/DYE: CPT

## 2020-08-27 PROCEDURE — 0 GADOBENATE DIMEGLUMINE 529 MG/ML SOLUTION: Performed by: INTERNAL MEDICINE

## 2020-08-27 PROCEDURE — A9577 INJ MULTIHANCE: HCPCS | Performed by: INTERNAL MEDICINE

## 2020-08-27 RX ADMIN — GADOBENATE DIMEGLUMINE 15 ML: 529 INJECTION, SOLUTION INTRAVENOUS at 15:12

## 2020-08-27 RX ADMIN — GADOBENATE DIMEGLUMINE 12 ML: 529 INJECTION, SOLUTION INTRAVENOUS at 15:13

## 2020-09-05 ENCOUNTER — LAB (OUTPATIENT)
Dept: LAB | Facility: HOSPITAL | Age: 50
End: 2020-09-05

## 2020-09-05 DIAGNOSIS — Z11.59 ENCOUNTER FOR HEPATITIS C SCREENING TEST FOR LOW RISK PATIENT: ICD-10-CM

## 2020-09-05 DIAGNOSIS — E22.1 HYPERPROLACTINEMIA (HCC): ICD-10-CM

## 2020-09-05 DIAGNOSIS — R73.03 BORDERLINE DIABETES: ICD-10-CM

## 2020-09-05 DIAGNOSIS — E29.1 HYPOGONADISM IN MALE: ICD-10-CM

## 2020-09-05 DIAGNOSIS — E78.49 OTHER HYPERLIPIDEMIA: ICD-10-CM

## 2020-09-05 LAB
CHOLEST SERPL-MCNC: 165 MG/DL (ref 0–200)
HAV IGM SERPL QL IA: NORMAL
HBA1C MFR BLD: 6.1 % (ref 4.8–5.6)
HBV CORE IGM SERPL QL IA: NORMAL
HBV SURFACE AG SERPL QL IA: NORMAL
HCV AB SER DONR QL: NORMAL
HDLC SERPL-MCNC: 29 MG/DL (ref 40–60)
HOLD SPECIMEN: NORMAL
LDLC SERPL CALC-MCNC: 116 MG/DL (ref 0–100)
LDLC/HDLC SERPL: 3.99 {RATIO}
PROLACTIN SERPL-MCNC: 0.78 NG/ML (ref 4.04–15.2)
TRIGL SERPL-MCNC: 102 MG/DL (ref 0–150)
VLDLC SERPL-MCNC: 20.4 MG/DL (ref 5–40)

## 2020-09-05 PROCEDURE — 83036 HEMOGLOBIN GLYCOSYLATED A1C: CPT

## 2020-09-05 PROCEDURE — 80074 ACUTE HEPATITIS PANEL: CPT

## 2020-09-05 PROCEDURE — 84402 ASSAY OF FREE TESTOSTERONE: CPT

## 2020-09-05 PROCEDURE — 80061 LIPID PANEL: CPT

## 2020-09-05 PROCEDURE — 84146 ASSAY OF PROLACTIN: CPT

## 2020-09-05 PROCEDURE — 84403 ASSAY OF TOTAL TESTOSTERONE: CPT

## 2020-09-05 PROCEDURE — 36415 COLL VENOUS BLD VENIPUNCTURE: CPT

## 2020-09-08 LAB
TESTOST FREE SERPL-MCNC: 5.1 PG/ML (ref 6.8–21.5)
TESTOST SERPL-MCNC: 247 NG/DL (ref 264–916)

## 2020-09-09 ENCOUNTER — PATIENT MESSAGE (OUTPATIENT)
Dept: ENDOCRINOLOGY | Facility: CLINIC | Age: 50
End: 2020-09-09

## 2020-09-09 ENCOUNTER — OFFICE VISIT (OUTPATIENT)
Dept: ENDOCRINOLOGY | Facility: CLINIC | Age: 50
End: 2020-09-09

## 2020-09-09 VITALS
DIASTOLIC BLOOD PRESSURE: 78 MMHG | WEIGHT: 309.2 LBS | BODY MASS INDEX: 41.88 KG/M2 | HEART RATE: 72 BPM | OXYGEN SATURATION: 98 % | RESPIRATION RATE: 16 BRPM | SYSTOLIC BLOOD PRESSURE: 126 MMHG | HEIGHT: 72 IN

## 2020-09-09 DIAGNOSIS — G47.33 OBSTRUCTIVE SLEEP APNEA: ICD-10-CM

## 2020-09-09 DIAGNOSIS — E22.1 HYPERPROLACTINEMIA (HCC): Primary | ICD-10-CM

## 2020-09-09 DIAGNOSIS — E29.1 HYPOGONADISM IN MALE: ICD-10-CM

## 2020-09-09 DIAGNOSIS — R73.03 PREDIABETES: ICD-10-CM

## 2020-09-09 DIAGNOSIS — D35.2 PITUITARY MICROADENOMA (HCC): ICD-10-CM

## 2020-09-09 DIAGNOSIS — E66.01 MORBID OBESITY (HCC): ICD-10-CM

## 2020-09-09 PROCEDURE — 99214 OFFICE O/P EST MOD 30 MIN: CPT | Performed by: INTERNAL MEDICINE

## 2020-09-09 RX ORDER — CABERGOLINE 0.5 MG/1
0.25 TABLET ORAL
Qty: 4 TABLET | Refills: 5 | Status: SHIPPED | OUTPATIENT
Start: 2020-09-09 | End: 2021-05-24 | Stop reason: SDUPTHER

## 2020-09-09 NOTE — PROGRESS NOTES
"Chief Complaint   Patient presents with   • Hyperprolactinemia        HPI   Harish Anaya is a 50 y.o. male had concerns including Hyperprolactinemia.    Here today for follow-up on hyperprolactinemia, low testosterone, prediabetes and obesity.  Sleep apnea was diagnosed and he has had a CPAP for 3/4 weeks but reports taking it off during the night unknowingly.   He hasn't been taking the elavil consistently but feels better when he takes it consistently, also sleeps more soundly.    Testosterone level has improved from 30s to 240s.  He has noted improvement in his stamina and energy levels though still is not feeling back to his baseline.    Prolactin levels are low at 0.78 on cabergoline 0.25 mg once a week.  Dose was decreased from twice a week after his last level was also low at 0.7.    Recent MRI from 8/27/2020 showed a microadenoma was unchanged in size (5mm) though was less conspicuous.     Was on a vegan diet for some time. Has added back eggs and dairy (cheese) and fish.   Feels like he eats late too often. Also wants to start exercising.   Weight, despite diet control, is up about 10 lbs since we last spoke. He hasn't been monitoring his calorie intake.     The following portions of the patient's history were reviewed and updated as appropriate: allergies, current medications, past family history, past medical history, past social history, past surgical history and problem list.    Review of Systems   Constitutional: Negative.    HENT: Negative.    Eyes: Negative.    Respiratory: Negative.    Cardiovascular: Negative.    Gastrointestinal: Negative.    Endocrine: Negative.    Genitourinary: Negative.    Musculoskeletal: Negative.    Skin: Negative.    Allergic/Immunologic: Negative.    Neurological: Negative.    Hematological: Negative.    Psychiatric/Behavioral: Negative.       ROS was reviewed and verified. All other ROS negative.    /78   Pulse 72   Resp 16   Ht 182.9 cm (72.01\")   Wt " (!) 140 kg (309 lb 3.2 oz)   SpO2 98%   BMI 41.93 kg/m²      Physical Exam      Constitutional:  well developed; well nourished  no acute distress  obese - Body mass index is 41.93 kg/m².   ENT/Thyroid: not examined   Eyes: EOM intact  Conjunctiva: clear   Respiratory:  breathing is unlabored   Cardiovascular:  Not performed.   Chest:  Not performed.   Abdomen: Not performed.   : Not performed.   Musculoskeletal: negative findings:  ROM of all joints is normal, no deformities present   Skin: not performed.   Neuro: normal without focal findings and mental status, speech normal, alert and oriented x3   Psych: oriented to time, place and person, mood and affect are within normal limits     Labs/Imaging    Lab on 09/05/2020   Component Date Value Ref Range Status   • Prolactin 09/05/2020 0.78* 4.04 - 15.20 ng/mL Final   • Testosterone, Total 09/05/2020 247* 264 - 916 ng/dL Final    Adult male reference interval is based on a population of  healthy nonobese males (BMI <30) between 19 and 39 years old.  Waleska et.al. JCEM 2017,102;4194-4392. PMID: 98397311.   • Testosterone, Free 09/05/2020 5.1* 6.8 - 21.5 pg/mL Final   • Total Cholesterol 09/05/2020 165  0 - 200 mg/dL Final   • Triglycerides 09/05/2020 102  0 - 150 mg/dL Final   • HDL Cholesterol 09/05/2020 29* 40 - 60 mg/dL Final   • LDL Cholesterol  09/05/2020 116* 0 - 100 mg/dL Final   • VLDL Cholesterol 09/05/2020 20.4  5 - 40 mg/dL Final   • LDL/HDL Ratio 09/05/2020 3.99   Final   • Hemoglobin A1C 09/05/2020 6.10* 4.80 - 5.60 % Final   • Hepatitis B Surface Ag 09/05/2020 Non-Reactive  Non-Reactive Final   • Hep A IgM 09/05/2020 Non-Reactive  Non-Reactive Final   • Hep B C IgM 09/05/2020 Non-Reactive  Non-Reactive Final   • Hepatitis C Ab 09/05/2020 Non-Reactive  Non-Reactive Final   • Extra Tube 09/05/2020 Hold for add-ons.   Final    Auto resulted.       Lab Results   Component Value Date    TESTOSTEROTT 247 (L) 09/05/2020    TESTOSTEROTT 59 (L) 01/11/2020  "    Lab Results   Component Value Date    TESTFRE 5.1 (L) 09/05/2020    TESTFRE 2.7 (L) 01/23/2020    TESTFRE 4.0 (L) 01/11/2020     Lab Results   Component Value Date    PROLACTIN 0.78 (L) 09/05/2020    PROLACTIN 0.70 (L) 05/30/2020    PROLACTIN 126.00 (H) 03/04/2020     MRI PITUITARY     INDICATION: Neuroendocrine sign/symptoms. Follow-up pituitary adenoma.     FINDINGS: Multisequence multiplanar MR imaging of the pituitary without  and with intravenous contrast. Correlation is made with previous exam  dated 02/27/2020. A previously seen 5 mm area of decreased enhancement  involving the left side of the pituitary gland is less conspicuous. The  left side of the pituitary does remain somewhat more prominent than the  right side. The findings again are likely related to a tiny microadenoma  measuring a few millimeters in size and less conspicuous than previous.  The pituitary infundibulum is unremarkable. Posterior pituitary  otherwise appears unremarkable as well. This was not a complete MRI of  the brain but the visualized portions of the brain demonstrate no acute  process or abnormal enhancement.     IMPRESSION:  Previously described 5 mm left-sided pituitary microadenoma  is slightly less conspicuous. No other change identified.     This report was finalized on 8/27/2020 3:48 PM by Carson Ivy MD.    Assessment and Plan    Harish was seen today for hyperprolactinemia.    Diagnoses and all orders for this visit:    Hyperprolactinemia (CMS/HCC)  Controlled on cabergoline 0.25 mg once a week.  Prolactin level remains low therefore, decreased to half a tablet every 2 weeks.  Recheck labs in 2 months.  -     cabergoline (DOSTINEX) 0.5 MG tablet; Take 0.5 tablets by mouth Every 14 (Fourteen) Days.  -     Prolactin; Future    Pituitary microadenoma (CMS/HCC)  Measuring unchanged at 5 mm though is \"less conspicuous\" on most recent MRI from 8/27/2020.  Repeat MRI in 1 year.    Morbid obesity (CMS/HCC)  BMI 41.9.  With " history of prediabetes and the need for weight loss, will try Ozempic (off label treatment) to hopefully aid in weight loss.  Patient had one episode of pancreatitis due to gallstones.  No family history of any interim PC.  Cautioned regarding possible GI side effects.  -     Semaglutide,0.25 or 0.5MG/DOS, (Ozempic, 0.25 or 0.5 MG/DOSE,) 2 MG/1.5ML solution pen-injector; Inject 0.25 mg under the skin into the appropriate area as directed 1 (One) Time Per Week. Increase to 0.5 mg weekly after 4 weeks    Prediabetes  Recently at 6.1.  Weight loss is necessary for overall health.  Trial of Ozempic (off label use for prediabetes) to aid in weight loss.  -     Semaglutide,0.25 or 0.5MG/DOS, (Ozempic, 0.25 or 0.5 MG/DOSE,) 2 MG/1.5ML solution pen-injector; Inject 0.25 mg under the skin into the appropriate area as directed 1 (One) Time Per Week. Increase to 0.5 mg weekly after 4 weeks    Hypogonadism in male  Testosterone levels are significantly improved since treatment of hyperprolactinemia.  Level remains slightly below normal which I suspect is predominantly due to sleep apnea (now treated) and obesity.  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  Recheck testosterone levels in 6 months.  No testosterone therapy is recommended at this time as I'd like to see how the levels improved with treatment of sleep apnea and obesity.    Obstructive sleep apnea  Not treated with CPAP x1 month.  Weight loss recommended as above.      Total of 35 minutes was spent with the patient face-to-face, more than 50% of the time in discussion of management of his current medical conditions and treatment options.       Return in about 4 months (around 1/9/2021) for next scheduled follow up. The patient was instructed to contact the clinic with any interval questions or concerns.    Elma Thomas, DO   Endocrinologist    Please note that portions of this document  were completed with a voice recognition program. Efforts were made to edit the dictations, but occasionally words are mis-transcribed.

## 2020-09-15 NOTE — TELEPHONE ENCOUNTER
From: Harish Anaya  Sent: 9/14/2020 5:30 PM EDT  To: e [facility]  Subject: RE: Non-Urgent Medical Question      I don't want to be a bother but what is the usual authorization time with an insurance co in this situation?    Sorry this is all new to me.    Harish      ----- Message -----  From: SYLVIA EDWARD  Sent: 9/10/20 10:41 AM  To: Harish Anaya  Subject: RE: Non-Urgent Medical Question    Good Morning Harish,   I received a fax this morning for a Prior Authorization for the Ozempic. I have submitted it to the insurance and should hear back soon. Thanks for giving me the heads up!     NIKKI Lala      ----- Message -----   From: Harish Anaya   Sent: 9/9/2020 8:16 PM EDT   To: Elma Thomas DO  Subject: Non-Urgent Medical Question    The medicine you prescribed for the pre diabetes needs to be pre authorized per Tenisha. I think the insurance just wants to confirm that you are treating a diabetic issue, shouldn't be more than a phone call or email I guess. They sent the info back to your office. Thank you.

## 2020-09-16 ENCOUNTER — TELEPHONE (OUTPATIENT)
Dept: ENDOCRINOLOGY | Facility: CLINIC | Age: 50
End: 2020-09-16

## 2020-09-16 DIAGNOSIS — E66.01 MORBID OBESITY (HCC): Primary | ICD-10-CM

## 2020-09-16 NOTE — TELEPHONE ENCOUNTER
I did not receive denial, I did contact pharmacy and ask for information on denial so I can attempt PA.

## 2020-10-15 DIAGNOSIS — K57.92 DIVERTICULITIS: Primary | ICD-10-CM

## 2020-10-15 RX ORDER — METRONIDAZOLE 250 MG/1
TABLET ORAL
Qty: 28 TABLET | Refills: 0 | Status: SHIPPED | OUTPATIENT
Start: 2020-10-15 | End: 2021-01-21

## 2020-10-15 RX ORDER — CIPROFLOXACIN 500 MG/1
TABLET, FILM COATED ORAL
Qty: 14 TABLET | Refills: 0 | Status: SHIPPED | OUTPATIENT
Start: 2020-10-15 | End: 2021-01-21

## 2020-10-30 ENCOUNTER — OFFICE VISIT (OUTPATIENT)
Dept: PULMONOLOGY | Facility: CLINIC | Age: 50
End: 2020-10-30

## 2020-10-30 VITALS
DIASTOLIC BLOOD PRESSURE: 74 MMHG | BODY MASS INDEX: 39.96 KG/M2 | HEART RATE: 81 BPM | SYSTOLIC BLOOD PRESSURE: 116 MMHG | OXYGEN SATURATION: 95 % | WEIGHT: 295 LBS | HEIGHT: 72 IN | RESPIRATION RATE: 18 BRPM

## 2020-10-30 DIAGNOSIS — R06.83 SNORING: ICD-10-CM

## 2020-10-30 DIAGNOSIS — G47.19 EXCESSIVE DAYTIME SLEEPINESS: ICD-10-CM

## 2020-10-30 DIAGNOSIS — G47.33 OBSTRUCTIVE SLEEP APNEA: Primary | ICD-10-CM

## 2020-10-30 PROCEDURE — 99213 OFFICE O/P EST LOW 20 MIN: CPT | Performed by: NURSE PRACTITIONER

## 2020-10-30 NOTE — PROGRESS NOTES
"Chief Complaint   Patient presents with   • Follow-up   • Sleep Apnea         Subjective   Harish Anaya is a 50 y.o. male.     History of Present Illness   The patient comes in today for follow-up of obstructive sleep apnea.    I reviewed his sleep study and discussed the results with him today.  The sleep study revealed mild sleep apnea with an apnea hypopnea index of 8 per hour.   He has been set up with AutoPAP at a pressure of 6/14. He is feeling much better however, he has had so many changes with other health issues that he cannot say it is all due to CPAP therapy. He has more energy and decreased daytime sleepiness and fatigue.    He is on call some nights and will not be able to use the machine.     He is working to lose weight and has lost 14 lbs since September.        The following portions of the patient's history were reviewed and updated as appropriate: allergies, current medications, past family history, past medical history, past social history and past surgical history.    Review of Systems   Constitutional: Negative for chills, fatigue and fever.   HENT: Negative for sinus pressure, sinus pain, sneezing and sore throat.    Respiratory: Negative for cough, shortness of breath and wheezing.    Cardiovascular: Negative for chest pain and leg swelling.   Psychiatric/Behavioral: Negative for sleep disturbance.       Objective   Visit Vitals  /74   Pulse 81   Resp 18   Ht 182.9 cm (72.01\")   Wt 134 kg (295 lb)   SpO2 95%   BMI 40.00 kg/m²         Physical Exam  Vitals signs reviewed.   HENT:      Head: Atraumatic.      Mouth/Throat:      Mouth: Mucous membranes are moist.      Pharynx: Oropharynx is clear.      Comments: Crowded oropharynx.  Neck:      Musculoskeletal: Neck supple.   Cardiovascular:      Rate and Rhythm: Normal rate and regular rhythm.   Pulmonary:      Effort: Pulmonary effort is normal. No respiratory distress.      Breath sounds: No wheezing.   Abdominal:      " Comments: Obese abdomen.   Musculoskeletal:      Comments: Gait normal.   Neurological:      Mental Status: He is alert and oriented to person, place, and time.             Assessment/Plan   Diagnoses and all orders for this visit:    1. Obstructive sleep apnea (Primary)    2. Excessive daytime sleepiness    3. Snoring           Return for keep appt in February.    DISCUSSION (if any):  Continue treatment with AutoPAP at a pressure of 6/14, with a nasal mask.    Patient's compliance data was reviewed and the compliance is greater than 70%.    Humidification setup, hose and mask care discussed.    Weight loss advised.    Use every night for at least 4 hours stressed.      Dictated utilizing Dragon dictation.    This document was electronically signed by ELAINE Johnson October 30, 2020  09:19 EDT

## 2020-12-17 ENCOUNTER — TELEPHONE (OUTPATIENT)
Dept: ENDOCRINOLOGY | Facility: CLINIC | Age: 50
End: 2020-12-17

## 2020-12-17 NOTE — TELEPHONE ENCOUNTER
"Your prior authorization for [employer] has been approved!  MORE INFO  For eligible patients, copay assistance may be available. To learn more and be redirected to the [employer]® website, click on the \"More Info\" button to the right. Please also note that you may need to schedule a follow-up visit with your patient prior to the expiration of this prior authorization, as updated patient weight may be required for reauthorization.    Message from plan: CaseId:[id number];Status:Approved;Review Type:Prior Auth;Coverage Start Date:11/17/2020;Coverage End Date:12/17/2021;  "

## 2021-01-08 ENCOUNTER — TELEPHONE (OUTPATIENT)
Dept: ENDOCRINOLOGY | Facility: CLINIC | Age: 51
End: 2021-01-08

## 2021-01-08 DIAGNOSIS — E22.1 HYPERPROLACTINEMIA (HCC): Primary | ICD-10-CM

## 2021-01-08 DIAGNOSIS — D35.2 PITUITARY MICROADENOMA (HCC): ICD-10-CM

## 2021-01-08 DIAGNOSIS — R73.03 PREDIABETES: ICD-10-CM

## 2021-01-18 ENCOUNTER — LAB (OUTPATIENT)
Dept: LAB | Facility: HOSPITAL | Age: 51
End: 2021-01-18

## 2021-01-18 DIAGNOSIS — E22.1 HYPERPROLACTINEMIA (HCC): ICD-10-CM

## 2021-01-18 DIAGNOSIS — R73.03 PREDIABETES: ICD-10-CM

## 2021-01-18 DIAGNOSIS — D35.2 PITUITARY MICROADENOMA (HCC): ICD-10-CM

## 2021-01-18 LAB
ALBUMIN SERPL-MCNC: 4.7 G/DL (ref 3.5–5.2)
ALBUMIN/GLOB SERPL: 2 G/DL
ALP SERPL-CCNC: 67 U/L (ref 39–117)
ALT SERPL W P-5'-P-CCNC: 28 U/L (ref 1–41)
ANION GAP SERPL CALCULATED.3IONS-SCNC: 9.8 MMOL/L (ref 5–15)
AST SERPL-CCNC: 17 U/L (ref 1–40)
BASOPHILS # BLD AUTO: 0.03 10*3/MM3 (ref 0–0.2)
BASOPHILS NFR BLD AUTO: 0.4 % (ref 0–1.5)
BILIRUB SERPL-MCNC: 0.5 MG/DL (ref 0–1.2)
BUN SERPL-MCNC: 15 MG/DL (ref 6–20)
BUN/CREAT SERPL: 15 (ref 7–25)
CALCIUM SPEC-SCNC: 9.6 MG/DL (ref 8.6–10.5)
CHLORIDE SERPL-SCNC: 103 MMOL/L (ref 98–107)
CO2 SERPL-SCNC: 28.2 MMOL/L (ref 22–29)
CREAT SERPL-MCNC: 1 MG/DL (ref 0.76–1.27)
DEPRECATED RDW RBC AUTO: 39.9 FL (ref 37–54)
EOSINOPHIL # BLD AUTO: 0.09 10*3/MM3 (ref 0–0.4)
EOSINOPHIL NFR BLD AUTO: 1.2 % (ref 0.3–6.2)
ERYTHROCYTE [DISTWIDTH] IN BLOOD BY AUTOMATED COUNT: 12.7 % (ref 12.3–15.4)
GFR SERPL CREATININE-BSD FRML MDRD: 79 ML/MIN/1.73
GLOBULIN UR ELPH-MCNC: 2.3 GM/DL
GLUCOSE SERPL-MCNC: 104 MG/DL (ref 65–99)
HBA1C MFR BLD: 5.8 % (ref 4.8–5.6)
HCT VFR BLD AUTO: 46.1 % (ref 37.5–51)
HGB BLD-MCNC: 15.7 G/DL (ref 13–17.7)
IMM GRANULOCYTES # BLD AUTO: 0.02 10*3/MM3 (ref 0–0.05)
IMM GRANULOCYTES NFR BLD AUTO: 0.3 % (ref 0–0.5)
LYMPHOCYTES # BLD AUTO: 1.66 10*3/MM3 (ref 0.7–3.1)
LYMPHOCYTES NFR BLD AUTO: 22.7 % (ref 19.6–45.3)
MCH RBC QN AUTO: 29.9 PG (ref 26.6–33)
MCHC RBC AUTO-ENTMCNC: 34.1 G/DL (ref 31.5–35.7)
MCV RBC AUTO: 87.8 FL (ref 79–97)
MONOCYTES # BLD AUTO: 0.62 10*3/MM3 (ref 0.1–0.9)
MONOCYTES NFR BLD AUTO: 8.5 % (ref 5–12)
NEUTROPHILS NFR BLD AUTO: 4.88 10*3/MM3 (ref 1.7–7)
NEUTROPHILS NFR BLD AUTO: 66.9 % (ref 42.7–76)
NRBC BLD AUTO-RTO: 0 /100 WBC (ref 0–0.2)
PLATELET # BLD AUTO: 213 10*3/MM3 (ref 140–450)
PMV BLD AUTO: 11.9 FL (ref 6–12)
POTASSIUM SERPL-SCNC: 4.9 MMOL/L (ref 3.5–5.2)
PROLACTIN SERPL-MCNC: 9.82 NG/ML (ref 4.04–15.2)
PROT SERPL-MCNC: 7 G/DL (ref 6–8.5)
RBC # BLD AUTO: 5.25 10*6/MM3 (ref 4.14–5.8)
SODIUM SERPL-SCNC: 141 MMOL/L (ref 136–145)
T4 FREE SERPL-MCNC: 1.31 NG/DL (ref 0.93–1.7)
TSH SERPL DL<=0.05 MIU/L-ACNC: 1.34 UIU/ML (ref 0.27–4.2)
WBC # BLD AUTO: 7.3 10*3/MM3 (ref 3.4–10.8)

## 2021-01-18 PROCEDURE — 84402 ASSAY OF FREE TESTOSTERONE: CPT

## 2021-01-18 PROCEDURE — 85025 COMPLETE CBC W/AUTO DIFF WBC: CPT

## 2021-01-18 PROCEDURE — 84443 ASSAY THYROID STIM HORMONE: CPT

## 2021-01-18 PROCEDURE — 84146 ASSAY OF PROLACTIN: CPT

## 2021-01-18 PROCEDURE — 80053 COMPREHEN METABOLIC PANEL: CPT

## 2021-01-18 PROCEDURE — 84439 ASSAY OF FREE THYROXINE: CPT

## 2021-01-18 PROCEDURE — 84403 ASSAY OF TOTAL TESTOSTERONE: CPT

## 2021-01-18 PROCEDURE — 83036 HEMOGLOBIN GLYCOSYLATED A1C: CPT

## 2021-01-21 ENCOUNTER — OFFICE VISIT (OUTPATIENT)
Dept: ENDOCRINOLOGY | Facility: CLINIC | Age: 51
End: 2021-01-21

## 2021-01-21 VITALS
DIASTOLIC BLOOD PRESSURE: 84 MMHG | WEIGHT: 295 LBS | HEIGHT: 73 IN | BODY MASS INDEX: 39.1 KG/M2 | SYSTOLIC BLOOD PRESSURE: 110 MMHG | HEART RATE: 76 BPM

## 2021-01-21 DIAGNOSIS — E66.01 CLASS 2 SEVERE OBESITY DUE TO EXCESS CALORIES WITH SERIOUS COMORBIDITY AND BODY MASS INDEX (BMI) OF 38.0 TO 38.9 IN ADULT (HCC): ICD-10-CM

## 2021-01-21 DIAGNOSIS — E29.1 HYPOGONADISM IN MALE: ICD-10-CM

## 2021-01-21 DIAGNOSIS — D35.2 PITUITARY MICROADENOMA (HCC): Primary | ICD-10-CM

## 2021-01-21 DIAGNOSIS — E22.1 HYPERPROLACTINEMIA (HCC): ICD-10-CM

## 2021-01-21 DIAGNOSIS — R73.03 PREDIABETES: ICD-10-CM

## 2021-01-21 LAB
TESTOST FREE SERPL-MCNC: 5 PG/ML (ref 6.8–21.5)
TESTOST SERPL-MCNC: 197 NG/DL (ref 264–916)

## 2021-01-21 PROCEDURE — 99214 OFFICE O/P EST MOD 30 MIN: CPT | Performed by: INTERNAL MEDICINE

## 2021-01-21 NOTE — PROGRESS NOTES
"Chief Complaint   Patient presents with   • Pituitary Adenoma        HPI   Harish Anaya is a 50 y.o. male had concerns including Pituitary Adenoma.      Two weeks ago he had significant fatigue but is feeling well now.     Weight is stable. He started on saxenda several months ago. Is up to 3 mg now. Has noted improvement in his appetite and is having no side effects.   He had lost more weight but regained over the holidays.   Is taking on a new role at work and will be more physically active.    Breakfast is one packet of sugar free oatmeal. Is eating more salads and less carbs/sweets since holidays are over.      Takes cabergoline 0.25 mg every other week. Prolactin remains normal.      The following portions of the patient's history were reviewed and updated as appropriate: allergies, current medications, past family history, past medical history, past social history, past surgical history and problem list.    Review of Systems   Constitutional: Positive for fatigue.   HENT: Negative.    Eyes: Negative.    Respiratory: Negative.    Cardiovascular: Negative.    Gastrointestinal: Negative.    Endocrine: Negative.    Genitourinary: Negative.    Musculoskeletal: Negative.    Skin: Negative.    Allergic/Immunologic: Negative.    Neurological: Negative.    Hematological: Negative.    Psychiatric/Behavioral: Negative.      I have reviewed and confirmed the accuracy of the ROS as documented by the MA/LPN/RN Elma Thomas, DO    /84 (BP Location: Left arm, Patient Position: Sitting, Cuff Size: Adult)   Pulse 76   Ht 185.4 cm (73\")   Wt 134 kg (295 lb)   BMI 38.92 kg/m²      Physical Exam  Vitals signs reviewed.   Constitutional:       Appearance: Normal appearance. He is obese.   Pulmonary:      Effort: Pulmonary effort is normal.   Neurological:      Mental Status: He is alert.   Psychiatric:         Mood and Affect: Mood normal.         Behavior: Behavior normal.          Labs/Imaging    Lab on " 01/18/2021   Component Date Value Ref Range Status   • Testosterone, Total 01/18/2021 197* 264 - 916 ng/dL Final    Adult male reference interval is based on a population of  healthy nonobese males (BMI <30) between 19 and 39 years old.  shay [email or web address].al. JCEM 2017,102;0560-6611. PMID: [id number].   • Testosterone, Free 01/18/2021 5.0* 6.8 - 21.5 pg/mL Final   • Hemoglobin A1C 01/18/2021 5.80* 4.80 - 5.60 % Final   • Prolactin 01/18/2021 9.82  4.04 - 15.20 ng/mL Final   • Glucose 01/18/2021 104* 65 - 99 mg/dL Final   • BUN 01/18/2021 15  6 - 20 mg/dL Final   • Creatinine 01/18/2021 1.00  0.76 - 1.27 mg/dL Final   • Sodium 01/18/2021 141  136 - 145 mmol/L Final   • Potassium 01/18/2021 4.9  3.5 - 5.2 mmol/L Final   • Chloride 01/18/2021 103  98 - 107 mmol/L Final   • CO2 01/18/2021 28.2  22.0 - 29.0 mmol/L Final   • Calcium 01/18/2021 9.6  8.6 - 10.5 mg/dL Final   • Total Protein 01/18/2021 7.0  6.0 - 8.5 g/dL Final   • Albumin 01/18/2021 4.70  3.50 - 5.20 g/dL Final   • ALT (SGPT) 01/18/2021 28  1 - 41 U/L Final   • AST (SGOT) 01/18/2021 17  1 - 40 U/L Final   • Alkaline Phosphatase 01/18/2021 67  39 - 117 U/L Final   • Total Bilirubin 01/18/2021 0.5  0.0 - 1.2 mg/dL Final   • eGFR Non  Amer 01/18/2021 79  >60 mL/min/1.73 Final   • Globulin 01/18/2021 2.3  gm/dL Final   • A/G Ratio 01/18/2021 2.0  g/dL Final   • BUN/Creatinine Ratio 01/18/2021 15.0  7.0 - 25.0 Final   • Anion Gap 01/18/2021 9.8  5.0 - 15.0 mmol/L Final   • Free T4 01/18/2021 1.31  0.93 - 1.70 ng/dL Final   • TSH 01/18/2021 1.340  0.270 - 4.200 uIU/mL Final   • WBC 01/18/2021 7.30  3.40 - 10.80 10*3/mm3 Final   • RBC 01/18/2021 5.25  4.14 - 5.80 10*6/mm3 Final   • Hemoglobin 01/18/2021 15.7  13.0 - 17.7 g/dL Final   • Hematocrit 01/18/2021 46.1  37.5 - 51.0 % Final   • MCV 01/18/2021 87.8  79.0 - 97.0 fL Final   • MCH 01/18/2021 29.9  26.6 - 33.0 pg Final   • MCHC 01/18/2021 34.1  31.5 - 35.7 g/dL Final   • RDW 01/18/2021 12.7  12.3 - 15.4 %  Final   • RDW-SD 01/18/2021 39.9  37.0 - 54.0 fl Final   • MPV 01/18/2021 11.9  6.0 - 12.0 fL Final   • Platelets 01/18/2021 213  140 - 450 10*3/mm3 Final   • Neutrophil % 01/18/2021 66.9  42.7 - 76.0 % Final   • Lymphocyte % 01/18/2021 22.7  19.6 - 45.3 % Final   • Monocyte % 01/18/2021 8.5  5.0 - 12.0 % Final   • Eosinophil % 01/18/2021 1.2  0.3 - 6.2 % Final   • Basophil % 01/18/2021 0.4  0.0 - 1.5 % Final   • Immature Grans % 01/18/2021 0.3  0.0 - 0.5 % Final   • Neutrophils, Absolute 01/18/2021 4.88  1.70 - 7.00 10*3/mm3 Final   • Lymphocytes, Absolute 01/18/2021 1.66  0.70 - 3.10 10*3/mm3 Final   • Monocytes, Absolute 01/18/2021 0.62  0.10 - 0.90 10*3/mm3 Final   • Eosinophils, Absolute 01/18/2021 0.09  0.00 - 0.40 10*3/mm3 Final   • Basophils, Absolute 01/18/2021 0.03  0.00 - 0.20 10*3/mm3 Final   • Immature Grans, Absolute 01/18/2021 0.02  0.00 - 0.05 10*3/mm3 Final   • nRBC 01/18/2021 0.0  0.0 - 0.2 /100 WBC Final     Lab Results   Component Value Date     (H) 09/05/2020    HDL 29 (L) 09/05/2020    TRIG 102 09/05/2020    CHLPL 243 (H) 03/16/2017         MRI PITUITARY     INDICATION: Neuroendocrine sign/symptoms. Follow-up pituitary adenoma.     FINDINGS: Multisequence multiplanar MR imaging of the pituitary without  and with intravenous contrast. Correlation is made with previous exam  dated 02/27/2020. A previously seen 5 mm area of decreased enhancement  involving the left side of the pituitary gland is less conspicuous. The  left side of the pituitary does remain somewhat more prominent than the  right side. The findings again are likely related to a tiny microadenoma  measuring a few millimeters in size and less conspicuous than previous.  The pituitary infundibulum is unremarkable. Posterior pituitary  otherwise appears unremarkable as well. This was not a complete MRI of  the brain but the visualized portions of the brain demonstrate no acute  process or abnormal enhancement.      IMPRESSION:  Previously described 5 mm left-sided pituitary microadenoma  is slightly less conspicuous. No other change identified.     This report was finalized on 8/27/2020 3:48 PM by Carson Ivy MD.    Assessment and Plan    Diagnoses and all orders for this visit:    1. Pituitary microadenoma (CMS/HCC) (Primary)  5 mm microadenoma. Unchanged on MRI from 8/27/20 from 6 months prior.   Repeat MRI 8/2021.    2. Hyperprolactinemia (CMS/HCC)  Controlled on cabergoline 0.25 mg every other week. Continue current dose.   Recheck prolactin in 4 months.     3. Hypogonadism in male  Due to obesity, CONRAD, history of hyperprolactinemia with hypogonadotropic hypogonadism.   Recommend to continue to monitor as I suspect this can improve with weight loss.  Recheck in 4 months.     4. Prediabetes  Controlled with A1c of 5.8 and improved from 6.1 last visit.   Continue saxenda and weight loss.  -     Liraglutide -Weight Management 18 MG/3ML solution pen-injector; 3 mg daily  Dispense: 15 mL; Refill: 1    5. Class 2 severe obesity due to excess calories with serious comorbidity and body mass index (BMI) of 38.0 to 38.9 in adult (CMS/HCC)  BMI stable at 38.9. Weight loss is necessary for overall health, management of diabetes and hypogonadism.   Pt is back on track after the holidays. Continue saxenda.  -     Liraglutide -Weight Management 18 MG/3ML solution pen-injector; 3 mg daily  Dispense: 15 mL; Refill: 1         Return in about 4 months (around 5/21/2021) for next scheduled follow up. The patient was instructed to contact the clinic with any interval questions or concerns.    Elma Thomas, DO   Endocrinologist    Please note that portions of this document were completed with a voice recognition program. Efforts were made to edit the dictations, but occasionally words are mis-transcribed.

## 2021-05-21 ENCOUNTER — LAB (OUTPATIENT)
Dept: LAB | Facility: HOSPITAL | Age: 51
End: 2021-05-21

## 2021-05-21 DIAGNOSIS — E29.1 HYPOGONADISM IN MALE: ICD-10-CM

## 2021-05-21 DIAGNOSIS — R73.03 PREDIABETES: ICD-10-CM

## 2021-05-21 DIAGNOSIS — E22.1 HYPERPROLACTINEMIA (HCC): ICD-10-CM

## 2021-05-21 LAB
HBA1C MFR BLD: 5.56 % (ref 4.8–5.6)
PROLACTIN SERPL-MCNC: 4.64 NG/ML (ref 4.04–15.2)

## 2021-05-21 PROCEDURE — 83036 HEMOGLOBIN GLYCOSYLATED A1C: CPT

## 2021-05-21 PROCEDURE — 36415 COLL VENOUS BLD VENIPUNCTURE: CPT

## 2021-05-21 PROCEDURE — 84146 ASSAY OF PROLACTIN: CPT

## 2021-05-21 PROCEDURE — 84402 ASSAY OF FREE TESTOSTERONE: CPT

## 2021-05-21 PROCEDURE — 84403 ASSAY OF TOTAL TESTOSTERONE: CPT

## 2021-05-24 ENCOUNTER — OFFICE VISIT (OUTPATIENT)
Dept: ENDOCRINOLOGY | Facility: CLINIC | Age: 51
End: 2021-05-24

## 2021-05-24 VITALS
HEIGHT: 72 IN | BODY MASS INDEX: 38.44 KG/M2 | WEIGHT: 283.8 LBS | SYSTOLIC BLOOD PRESSURE: 116 MMHG | OXYGEN SATURATION: 95 % | HEART RATE: 65 BPM | DIASTOLIC BLOOD PRESSURE: 70 MMHG

## 2021-05-24 DIAGNOSIS — D35.2 PITUITARY MICROADENOMA (HCC): ICD-10-CM

## 2021-05-24 DIAGNOSIS — E66.01 CLASS 2 SEVERE OBESITY DUE TO EXCESS CALORIES WITH SERIOUS COMORBIDITY AND BODY MASS INDEX (BMI) OF 38.0 TO 38.9 IN ADULT (HCC): ICD-10-CM

## 2021-05-24 DIAGNOSIS — E22.1 HYPERPROLACTINEMIA (HCC): Primary | ICD-10-CM

## 2021-05-24 DIAGNOSIS — E29.1 HYPOGONADISM IN MALE: ICD-10-CM

## 2021-05-24 DIAGNOSIS — R73.03 PREDIABETES: ICD-10-CM

## 2021-05-24 PROCEDURE — 99215 OFFICE O/P EST HI 40 MIN: CPT | Performed by: INTERNAL MEDICINE

## 2021-05-24 RX ORDER — CABERGOLINE 0.5 MG/1
0.25 TABLET ORAL
Qty: 4 TABLET | Refills: 3 | Status: SHIPPED | OUTPATIENT
Start: 2021-05-24 | End: 2022-01-31 | Stop reason: SDUPTHER

## 2021-05-24 NOTE — PROGRESS NOTES
"Chief Complaint   Patient presents with   • Pituitary Problem        HPI   Harish Anaya is a 51 y.o. male had concerns including Pituitary Problem.      Here for follow-up.  On cabergoline half a tab every 2 weeks.  Prolactin levels remain in normal and trending down.    Weight has decreased 12 pounds since his last visit here.  Ran out of Saxenda 2 weeks ago but wants to continue.    Feels like testosterone levels may be low and wants to consider initiation of therapy pending recent lab.    A1c down to 5.5.    The following portions of the patient's history were reviewed and updated as appropriate: allergies, current medications, past family history, past medical history, past social history, past surgical history and problem list.    Review of Systems   Constitutional: Positive for fatigue.   Endocrine: Negative.    Neurological: Positive for weakness.        /70   Pulse 65   Ht 182.9 cm (72\")   Wt 129 kg (283 lb 12.8 oz)   SpO2 95%   BMI 38.49 kg/m²      Physical Exam  Vitals reviewed.   Constitutional:       Appearance: Normal appearance. He is obese.   Cardiovascular:      Rate and Rhythm: Normal rate.   Pulmonary:      Effort: Pulmonary effort is normal.   Neurological:      General: No focal deficit present.      Mental Status: He is alert. Mental status is at baseline.   Psychiatric:         Mood and Affect: Mood normal.         Behavior: Behavior normal.        CMP  Lab Results   Component Value Date    GLUCOSE 104 (H) 01/18/2021    BUN 15 01/18/2021    CREATININE 1.00 01/18/2021    EGFRIFNONA 79 01/18/2021    EGFRIFAFRI 88 03/16/2017    BCR 15.0 01/18/2021    K 4.9 01/18/2021    CO2 28.2 01/18/2021    CALCIUM 9.6 01/18/2021    PROTENTOTREF 7.4 03/16/2017    ALBUMIN 4.70 01/18/2021    LABIL2 1.7 03/16/2017    AST 17 01/18/2021    ALT 28 01/18/2021        CBC w/DIFF   Lab Results   Component Value Date    WBC 7.30 01/18/2021    RBC 5.25 01/18/2021    HGB 15.7 01/18/2021    HCT 46.1 " 01/18/2021    MCV 87.8 01/18/2021    MCH 29.9 01/18/2021    MCHC 34.1 01/18/2021    RDW 12.7 01/18/2021    RDWSD 39.9 01/18/2021    MPV 11.9 01/18/2021     01/18/2021    NEUTRORELPCT 66.9 01/18/2021    LYMPHORELPCT 22.7 01/18/2021    MONORELPCT 8.5 01/18/2021    EOSRELPCT 1.2 01/18/2021    BASORELPCT 0.4 01/18/2021    AUTOIGPER 0.3 01/18/2021    NEUTROABS 4.88 01/18/2021    LYMPHSABS 1.66 01/18/2021    MONOSABS 0.62 01/18/2021    EOSABS 0.09 01/18/2021    BASOSABS 0.03 01/18/2021    AUTOIGNUM 0.02 01/18/2021    NRBC 0.0 01/18/2021       TSH  Lab Results   Component Value Date    TSH 1.340 01/18/2021    TSH 0.737 03/04/2020    TSH 1.400 01/11/2020       T4  Lab Results   Component Value Date    FREET4 1.31 01/18/2021    FREET4 0.96 03/04/2020    FREET4 1.51 09/09/2015     Lab Results   Component Value Date    TESTOSTERONE 34.5 (L) 01/23/2020    TESTOSTEROTT 197 (L) 01/18/2021    TESTOSTEROTT 247 (L) 09/05/2020    TESTOSTEROTT 59 (L) 01/11/2020    PROLACTIN 4.64 05/21/2021    PROLACTIN 9.82 01/18/2021    PROLACTIN 0.78 (L) 09/05/2020    PROLACTIN 0.70 (L) 05/30/2020    PROLACTIN 126.00 (H) 03/04/2020    PROLACTIN 115.00 (H) 02/22/2020     Hemoglobin A1C   Date Value Ref Range Status   05/21/2021 5.56 4.80 - 5.60 % Final       Assessment and Plan    Diagnoses and all orders for this visit:    1. Hyperprolactinemia (CMS/HCC) (Primary)  Controlled on cabergoline 0.25 mg every 2 weeks.  Level continues to trend down.  Continue current dose.  If level trends below normal, consider once monthly dosing.  -     cabergoline (DOSTINEX) 0.5 MG tablet; Take 0.5 tablets by mouth Every 14 (Fourteen) Days.  Dispense: 4 tablet; Refill: 3    2. Pituitary microadenoma (CMS/HCC)  Last from August 2020 indicated unchanged 5 mm pituitary microadenoma.  Repeat scan in August.  If stable or decreasing in size, decrease frequency of monitoring.  -     MRI pituitary w wo contrast; Future    3. Hypogonadism in male  Initially suspected  due to hyperprolactinemia though levels have not fully for quite third despite normalization of prolactin levels.  Suspect obesity is in part contributing.  If testosterone levels are still low patient would like to start treatment.  Will try first topical and if cost is prohibitive consider once weekly injections.    4. Class 2 severe obesity due to excess calories with serious comorbidity and body mass index (BMI) of 38.0 to 38.9 in adult (CMS/[facility])  Weight loss of 12 pounds since his last visit here and BMI now down to 38.5.  Congratulated on efforts and encouraged to continue.  -     Liraglutide (SAXENDA) 18 MG/3ML injection pen; 3 mg daily  Dispense: 45 mL; Refill: 1    5. Prediabetes  A1c now in a normal range at 5.5 with weight loss.  -     Liraglutide (SAXENDA) 18 MG/3ML injection pen; 3 mg daily  Dispense: 45 mL; Refill: 1       A total of 40 minutes was spent in preparation for the visit, chart and lab review, with the patient in the office discussing prolactinoma, testosterone, weight management, prediabetes, next steps in treatment and monitoring, and in completion of the visit/documentation on 05/24/2021.          Return in about 4 months (around 9/24/2021) for next scheduled follow up. The patient was instructed to contact the clinic with any interval questions or concerns.    Elma Thomas, DO   Endocrinologist    Please note that portions of this document were completed with a voice recognition program. Efforts were made to edit the dictations, but occasionally words are mis-transcribed.

## 2021-05-25 LAB
TESTOST FREE SERPL-MCNC: 6 PG/ML (ref 7.2–24)
TESTOST SERPL-MCNC: 259 NG/DL (ref 264–916)

## 2021-06-10 DIAGNOSIS — G47.09 OTHER INSOMNIA: ICD-10-CM

## 2021-06-10 DIAGNOSIS — E78.5 HYPERLIPIDEMIA, UNSPECIFIED HYPERLIPIDEMIA TYPE: ICD-10-CM

## 2021-06-10 RX ORDER — AMITRIPTYLINE HYDROCHLORIDE 10 MG/1
TABLET, FILM COATED ORAL
Qty: 30 TABLET | Refills: 11 | OUTPATIENT
Start: 2021-06-10

## 2021-06-10 RX ORDER — ATORVASTATIN CALCIUM 40 MG/1
TABLET, FILM COATED ORAL
Qty: 30 TABLET | Refills: 11 | OUTPATIENT
Start: 2021-06-10

## 2021-06-28 DIAGNOSIS — E66.01 CLASS 2 SEVERE OBESITY DUE TO EXCESS CALORIES WITH SERIOUS COMORBIDITY AND BODY MASS INDEX (BMI) OF 38.0 TO 38.9 IN ADULT (HCC): ICD-10-CM

## 2021-06-28 DIAGNOSIS — R73.03 PREDIABETES: ICD-10-CM

## 2021-06-29 DIAGNOSIS — E66.01 CLASS 2 SEVERE OBESITY DUE TO EXCESS CALORIES WITH SERIOUS COMORBIDITY AND BODY MASS INDEX (BMI) OF 38.0 TO 38.9 IN ADULT (HCC): ICD-10-CM

## 2021-06-29 DIAGNOSIS — R73.03 PREDIABETES: ICD-10-CM

## 2021-08-09 ENCOUNTER — HOSPITAL ENCOUNTER (OUTPATIENT)
Dept: MRI IMAGING | Facility: HOSPITAL | Age: 51
Discharge: HOME | End: 2021-08-09
Admitting: INTERNAL MEDICINE

## 2021-08-09 DIAGNOSIS — D35.2 PITUITARY MICROADENOMA (HCC): ICD-10-CM

## 2021-08-09 PROCEDURE — 0 GADOBENATE DIMEGLUMINE 529 MG/ML SOLUTION: Performed by: INTERNAL MEDICINE

## 2021-08-09 PROCEDURE — 70553 MRI BRAIN STEM W/O & W/DYE: CPT

## 2021-08-09 PROCEDURE — A9577 INJ MULTIHANCE: HCPCS | Performed by: INTERNAL MEDICINE

## 2021-08-09 RX ADMIN — GADOBENATE DIMEGLUMINE 25 ML: 529 INJECTION, SOLUTION INTRAVENOUS at 14:51

## 2021-09-20 DIAGNOSIS — E66.01 CLASS 2 SEVERE OBESITY DUE TO EXCESS CALORIES WITH SERIOUS COMORBIDITY AND BODY MASS INDEX (BMI) OF 38.0 TO 38.9 IN ADULT (HCC): ICD-10-CM

## 2021-09-20 DIAGNOSIS — E29.1 HYPOGONADISM IN MALE: ICD-10-CM

## 2021-09-20 DIAGNOSIS — E22.1 HYPERPROLACTINEMIA (HCC): Primary | ICD-10-CM

## 2021-09-25 ENCOUNTER — LAB (OUTPATIENT)
Dept: LAB | Facility: HOSPITAL | Age: 51
End: 2021-09-25

## 2021-09-25 DIAGNOSIS — E29.1 HYPOGONADISM IN MALE: ICD-10-CM

## 2021-09-25 DIAGNOSIS — E22.1 HYPERPROLACTINEMIA (HCC): ICD-10-CM

## 2021-09-25 DIAGNOSIS — E66.01 CLASS 2 SEVERE OBESITY DUE TO EXCESS CALORIES WITH SERIOUS COMORBIDITY AND BODY MASS INDEX (BMI) OF 38.0 TO 38.9 IN ADULT (HCC): ICD-10-CM

## 2021-09-25 LAB
ALBUMIN SERPL-MCNC: 4.6 G/DL (ref 3.5–5.2)
ALBUMIN/GLOB SERPL: 2 G/DL
ALP SERPL-CCNC: 63 U/L (ref 39–117)
ALT SERPL W P-5'-P-CCNC: 34 U/L (ref 1–41)
ANION GAP SERPL CALCULATED.3IONS-SCNC: 10.6 MMOL/L (ref 5–15)
AST SERPL-CCNC: 22 U/L (ref 1–40)
BILIRUB SERPL-MCNC: 0.6 MG/DL (ref 0–1.2)
BUN SERPL-MCNC: 14 MG/DL (ref 6–20)
BUN/CREAT SERPL: 14.6 (ref 7–25)
CALCIUM SPEC-SCNC: 9.8 MG/DL (ref 8.6–10.5)
CHLORIDE SERPL-SCNC: 105 MMOL/L (ref 98–107)
CO2 SERPL-SCNC: 25.4 MMOL/L (ref 22–29)
CREAT SERPL-MCNC: 0.96 MG/DL (ref 0.76–1.27)
GFR SERPL CREATININE-BSD FRML MDRD: 83 ML/MIN/1.73
GLOBULIN UR ELPH-MCNC: 2.3 GM/DL
GLUCOSE SERPL-MCNC: 109 MG/DL (ref 65–99)
HBA1C MFR BLD: 6.1 % (ref 4.8–5.6)
POTASSIUM SERPL-SCNC: 4.6 MMOL/L (ref 3.5–5.2)
PROLACTIN SERPL-MCNC: 8.14 NG/ML (ref 4.04–15.2)
PROT SERPL-MCNC: 6.9 G/DL (ref 6–8.5)
SODIUM SERPL-SCNC: 141 MMOL/L (ref 136–145)
TSH SERPL DL<=0.05 MIU/L-ACNC: 1.07 UIU/ML (ref 0.27–4.2)

## 2021-09-25 PROCEDURE — 84443 ASSAY THYROID STIM HORMONE: CPT

## 2021-09-25 PROCEDURE — 36415 COLL VENOUS BLD VENIPUNCTURE: CPT

## 2021-09-25 PROCEDURE — 84402 ASSAY OF FREE TESTOSTERONE: CPT

## 2021-09-25 PROCEDURE — 84146 ASSAY OF PROLACTIN: CPT

## 2021-09-25 PROCEDURE — 84403 ASSAY OF TOTAL TESTOSTERONE: CPT

## 2021-09-25 PROCEDURE — 80053 COMPREHEN METABOLIC PANEL: CPT

## 2021-09-25 PROCEDURE — 83036 HEMOGLOBIN GLYCOSYLATED A1C: CPT

## 2021-09-28 ENCOUNTER — OFFICE VISIT (OUTPATIENT)
Dept: ENDOCRINOLOGY | Facility: CLINIC | Age: 51
End: 2021-09-28

## 2021-09-28 ENCOUNTER — PRIOR AUTHORIZATION (OUTPATIENT)
Dept: ENDOCRINOLOGY | Facility: CLINIC | Age: 51
End: 2021-09-28

## 2021-09-28 VITALS
DIASTOLIC BLOOD PRESSURE: 80 MMHG | BODY MASS INDEX: 39.42 KG/M2 | WEIGHT: 291 LBS | HEART RATE: 63 BPM | OXYGEN SATURATION: 97 % | HEIGHT: 72 IN | SYSTOLIC BLOOD PRESSURE: 140 MMHG

## 2021-09-28 DIAGNOSIS — E22.1 HYPERPROLACTINEMIA (HCC): Primary | ICD-10-CM

## 2021-09-28 DIAGNOSIS — E66.01 CLASS 2 SEVERE OBESITY DUE TO EXCESS CALORIES WITH SERIOUS COMORBIDITY AND BODY MASS INDEX (BMI) OF 39.0 TO 39.9 IN ADULT (HCC): ICD-10-CM

## 2021-09-28 DIAGNOSIS — E29.1 HYPOGONADISM IN MALE: ICD-10-CM

## 2021-09-28 DIAGNOSIS — R73.03 PREDIABETES: ICD-10-CM

## 2021-09-28 DIAGNOSIS — D35.2 PITUITARY MICROADENOMA (HCC): ICD-10-CM

## 2021-09-28 PROBLEM — E66.812 CLASS 2 SEVERE OBESITY DUE TO EXCESS CALORIES WITH SERIOUS COMORBIDITY AND BODY MASS INDEX (BMI) OF 39.0 TO 39.9 IN ADULT: Status: ACTIVE | Noted: 2021-09-28

## 2021-09-28 PROCEDURE — 99214 OFFICE O/P EST MOD 30 MIN: CPT | Performed by: INTERNAL MEDICINE

## 2021-09-28 RX ORDER — DULAGLUTIDE 0.75 MG/.5ML
0.75 INJECTION, SOLUTION SUBCUTANEOUS
Qty: 2 ML | Refills: 5 | Status: SHIPPED | OUTPATIENT
Start: 2021-09-28 | End: 2022-01-31

## 2021-09-28 NOTE — PROGRESS NOTES
"Chief Complaint   Patient presents with   • Pituitary Problem          HPI   Harish Anaya is a 51 y.o. male had concerns including Pituitary Problem.      Is feeling ok. Still having fatigue and weakness. No ED or libido issues.   Wants to start testosterone therapy.     Has sold his house and moved. Is not on saxenda at this time. Got off schedule with the move.    A1c is up to 6.1. Hasn't been controlling his diet.   Was on metformin in the past. Was taken off 2017.     Weight has trended up 8 lbs since his last visit here.    Has missed a dose of cabergoline. Otherwise taking every two weeks.        The following portions of the patient's history were reviewed and updated as appropriate: allergies, current medications, past family history, past medical history, past social history, past surgical history and problem list.      Review of Systems   Constitutional: Positive for fatigue and unexpected weight gain.   Neurological: Positive for weakness.   Psychiatric/Behavioral: Positive for stress.        Physical Exam  Vitals reviewed.   Constitutional:       Appearance: Normal appearance. He is obese.      Comments: Body mass index is 39.47 kg/m².   Cardiovascular:      Rate and Rhythm: Normal rate.   Pulmonary:      Effort: Pulmonary effort is normal.   Neurological:      General: No focal deficit present.      Mental Status: He is alert. Mental status is at baseline.   Psychiatric:         Mood and Affect: Mood normal.         Behavior: Behavior normal.        /80   Pulse 63   Ht 182.9 cm (72\")   Wt 132 kg (291 lb)   SpO2 97%   BMI 39.47 kg/m²      Labs and imaging    CMP:  Lab Results   Component Value Date     (H) 03/16/2017    BUN 14 09/25/2021    CREATININE 0.96 09/25/2021    EGFRIFNONA 83 09/25/2021    EGFRIFAFRI 88 03/16/2017    BCR 14.6 09/25/2021     09/25/2021    K 4.6 09/25/2021    CO2 25.4 09/25/2021    CALCIUM 9.8 09/25/2021    PROTENTOTREF 7.4 03/16/2017    ALBUMIN " 4.60 09/25/2021    LABGLOBREF 2.7 03/16/2017    LABIL2 1.7 03/16/2017    BILITOT 0.6 09/25/2021    ALKPHOS 63 09/25/2021    AST 22 09/25/2021    ALT 34 09/25/2021     Lipid Panel:  Lab Results   Component Value Date    CHOL 165 09/05/2020    TRIG 102 09/05/2020    HDL 29 (L) 09/05/2020    VLDL 20.4 09/05/2020     (H) 09/05/2020     HbA1c:  Lab Results   Component Value Date    HGBA1C 6.10 (H) 09/25/2021    HGBA1C 5.56 05/21/2021     TSH:  Lab Results   Component Value Date    TSH 1.070 09/25/2021     Lab Results   Component Value Date    PROLACTIN 8.14 09/25/2021    PROLACTIN 4.64 05/21/2021    PROLACTIN 9.82 01/18/2021    PROLACTIN 0.78 (L) 09/05/2020    PROLACTIN 0.70 (L) 05/30/2020    PROLACTIN 126.00 (H) 03/04/2020    PROLACTIN 115.00 (H) 02/22/2020    PROLACTIN 34.9 (H) 01/23/2020     Lab Results   Component Value Date    TESTOSTEROTT 259 (L) 05/21/2021    TESTOSTEROTT 197 (L) 01/18/2021    TESTOSTEROTT 247 (L) 09/05/2020    TESTOSTEROTT 59 (L) 01/11/2020     PROCEDURE: MRI PITUITARY W WO CONTRAST-     HISTORY: Pituitary adenoma, known or suspected; D35.2-Benign neoplasm of  pituitary gland     PROCEDURE: Multiplanar multisequence imaging of the brain was performed  both before and following the administration of 15 mL MultiHance  intravenous contrast.     COMPARISON: 8/27/2020 and 2/27/2020.     FINDINGS: There are no significant white matter abnormalities. There is  no mass, mass effect or midline shift. There is no hydrocephalus. There  are no areas of restricted diffusion. There is no pathologic contrast  enhancement.     The midbrain, yasmin, cerebellum and craniocervical junction are  unremarkable. Dynamic imaging through the pituitary gland demonstrates  interval decrease in size of the lesion in the left aspects of the  pituitary gland which measures approximately 2 mm on today's exam. The  major intracranial vasculature demonstrates the expected flow related  signal. The paranasal sinuses are  clear.     IMPRESSION:  Decrease in size of the patient's pituitary adenoma which  measures 2 mm on today's exam.        This report was finalized on 8/10/2021 3:33 PM by April Nagy M.D.    Assessment and plan  Diagnoses and all orders for this visit:    1. Hyperprolactinemia (HCC) (Primary)  Controlled on cabergoline 0.25 mg every 2 weeks.  Prolactin level trended up minimally though he has missed a dose of cabergoline in the last few months.  No change in dose.    2. Pituitary microadenoma (HCC)  MRI from 8/10/2021 showed decrease in size of the pituitary adenoma, down to 2 mm.  Repeat in 1 year.  If still decreasing in size, discontinue routine imaging.    3. Prediabetes  A1c trended up to 6.1 from 5.5.  Discussed weight loss, dietary control.  Prescription for Trulicity was sent to the pharmacy.  Patient aware this is off label for prediabetes.  History of Metformin though this was discontinued in 2017.  If Trulicity is not covered, resume Saxenda.  -     Dulaglutide (Trulicity) 0.75 MG/0.5ML solution pen-injector; Inject 0.75 mg under the skin into the appropriate area as directed Every 7 (Seven) Days.  Dispense: 2 mL; Refill: 5    4. Class 2 severe obesity due to excess calories with serious comorbidity and body mass index (BMI) of 39.0 to 39.9 in adult ([facility])  BMI up to 39.5 with an 8 pound weight gain since his last visit here.  Recommend dietary control with a goal for weight loss.  Hopefully Trulicity will be covered.  If not, resume Saxenda.    5. Hypogonadism in male  Due in part to history of hyperprolactinemia. Obesity also contributing. Testosterone levels have remained low despite prolactin levels being normal.  Awaiting testosterone level.  Plan to initiate testosterone therapy. CSA on file.        Return in about 4 months (around 1/28/2022) for next scheduled follow up. The patient was instructed to contact the clinic with any interval questions or concerns.    Elma Thomas,     Endocrinologist    Please note that portions of this document were completed with a voice recognition program. Efforts were made to edit the dictations, but occasionally words are mis-transcribed.

## 2021-09-28 NOTE — TELEPHONE ENCOUNTER
This request has been approved using information available on the patient's profile. CaseId:[id number];Status:Approved;Review Type:Prior Auth;Coverage Start Date:08/29/2021;Coverage End Date:09/27/2024;  Drug  Trulicity 0.75MG/0.5ML pen-injectors  Form  [employer] Electronic PA Form (2017 [employer])

## 2021-10-01 LAB
TESTOST FREE SERPL-MCNC: 3.9 PG/ML (ref 7.2–24)
TESTOST SERPL-MCNC: 165 NG/DL (ref 264–916)

## 2021-10-04 DIAGNOSIS — E22.1 HYPERPROLACTINEMIA (HCC): ICD-10-CM

## 2021-10-04 DIAGNOSIS — R73.03 PREDIABETES: ICD-10-CM

## 2021-10-04 DIAGNOSIS — E29.1 HYPOGONADISM IN MALE: Primary | ICD-10-CM

## 2021-10-04 DIAGNOSIS — D35.2 PITUITARY MICROADENOMA (HCC): ICD-10-CM

## 2021-10-04 RX ORDER — TESTOSTERONE CYPIONATE 200 MG/ML
200 INJECTION, SOLUTION INTRAMUSCULAR
Qty: 13 ML | Refills: 1 | Status: SHIPPED | OUTPATIENT
Start: 2021-10-04 | End: 2022-01-02

## 2021-10-05 ENCOUNTER — TELEPHONE (OUTPATIENT)
Dept: ENDOCRINOLOGY | Facility: CLINIC | Age: 51
End: 2021-10-05

## 2021-10-05 NOTE — TELEPHONE ENCOUNTER
Approvedtoday  CaseId:[id number];Status:Approved;Review Type:Prior Auth;Coverage Start Date:09/05/2021;Coverage End Date:10/05/2022;  Drug  Testosterone Cypionate 200MG/ML intramuscular solution  Form  [employer] Electronic PA Form (2017 [employer]CP[employer])  Original Claim Info  75 CALL HELP DESK

## 2021-10-05 NOTE — TELEPHONE ENCOUNTER
Harish Anaya Osborne: [id number] - PA Case ID: [id number] - Rx #: [id number] help? Call us at [phone]  Outcome  Approvedtoday  CaseId:[id number];Status:Approved;Review Type:Prior Auth;Coverage Start Date:09/05/2021;Coverage End Date:10/05/2022;  Drug  Testosterone Cypionate 200MG/ML intramuscular solution  Form  [employer] Electronic PA Form (2017 NCPDP)  Original Claim Info  75 CALL HELP DESK

## 2021-12-20 PROCEDURE — U0004 COV-19 TEST NON-CDC HGH THRU: HCPCS | Performed by: NURSE PRACTITIONER

## 2021-12-23 DIAGNOSIS — E29.1 HYPOGONADISM IN MALE: Primary | ICD-10-CM

## 2022-01-05 PROCEDURE — U0004 COV-19 TEST NON-CDC HGH THRU: HCPCS | Performed by: NURSE PRACTITIONER

## 2022-01-31 ENCOUNTER — OFFICE VISIT (OUTPATIENT)
Dept: ENDOCRINOLOGY | Facility: CLINIC | Age: 52
End: 2022-01-31

## 2022-01-31 VITALS
HEART RATE: 88 BPM | DIASTOLIC BLOOD PRESSURE: 67 MMHG | OXYGEN SATURATION: 96 % | WEIGHT: 303 LBS | BODY MASS INDEX: 41.04 KG/M2 | SYSTOLIC BLOOD PRESSURE: 122 MMHG | HEIGHT: 72 IN

## 2022-01-31 DIAGNOSIS — E78.2 MIXED HYPERLIPIDEMIA: ICD-10-CM

## 2022-01-31 DIAGNOSIS — E29.1 HYPOGONADISM IN MALE: ICD-10-CM

## 2022-01-31 DIAGNOSIS — E66.01 CLASS 3 SEVERE OBESITY DUE TO EXCESS CALORIES WITH SERIOUS COMORBIDITY AND BODY MASS INDEX (BMI) OF 40.0 TO 44.9 IN ADULT: ICD-10-CM

## 2022-01-31 DIAGNOSIS — Z80.0 FAMILY HISTORY OF PANCREATIC CANCER: ICD-10-CM

## 2022-01-31 DIAGNOSIS — D35.2 PITUITARY MICROADENOMA: ICD-10-CM

## 2022-01-31 DIAGNOSIS — E22.1 HYPERPROLACTINEMIA: ICD-10-CM

## 2022-01-31 DIAGNOSIS — R53.82 CHRONIC FATIGUE: ICD-10-CM

## 2022-01-31 DIAGNOSIS — R73.03 PREDIABETES: Primary | ICD-10-CM

## 2022-01-31 LAB
EXPIRATION DATE: NORMAL
EXPIRATION DATE: NORMAL
GLUCOSE BLDC GLUCOMTR-MCNC: 92 MG/DL (ref 70–130)
HBA1C MFR BLD: 6.1 %
Lab: NORMAL
Lab: NORMAL

## 2022-01-31 PROCEDURE — 83036 HEMOGLOBIN GLYCOSYLATED A1C: CPT | Performed by: INTERNAL MEDICINE

## 2022-01-31 PROCEDURE — 82947 ASSAY GLUCOSE BLOOD QUANT: CPT | Performed by: INTERNAL MEDICINE

## 2022-01-31 PROCEDURE — 99215 OFFICE O/P EST HI 40 MIN: CPT | Performed by: INTERNAL MEDICINE

## 2022-01-31 RX ORDER — CABERGOLINE 0.5 MG/1
0.25 TABLET ORAL
Qty: 4 TABLET | Refills: 3 | Status: SHIPPED | OUTPATIENT
Start: 2022-01-31 | End: 2022-02-08 | Stop reason: SDUPTHER

## 2022-01-31 RX ORDER — TESTOSTERONE CYPIONATE 200 MG/ML
1 VIAL (ML) INTRAMUSCULAR
COMMUNITY
End: 2022-06-01 | Stop reason: SDUPTHER

## 2022-01-31 NOTE — PROGRESS NOTES
"Chief Complaint   Patient presents with   • Hyperprolactinemia          HPI   Harish Anaya is a 51 y.o. male had concerns including Hyperprolactinemia.      Pt had COVID recently. Has fatigue though this was bad before COVID.     Is on testosterone 1 ml q 2 weeks. In general he feels minimally improved. Notes no fluctuation in symptoms after the injection.     Sleep is improved - got a new mattress.     His sister passed from pancreatic cancer in November. Has many family stressors/social stressors. Struggling with depression.     Is not taking the trulicity due to concern for MEN because of his sister's pancreatic cancer. No known family history of MEN or MTC.     Weight is up. Hasn't been following diet/exercising as he has in the past.   Plans to start exercising.       The following portions of the patient's history were reviewed and updated as appropriate: allergies, current medications, past family history, past medical history, past social history, past surgical history and problem list.      Review of Systems   Constitutional: Positive for activity change, appetite change, fatigue and unexpected weight gain.   Psychiatric/Behavioral: Positive for dysphoric mood and stress.        Physical Exam  Vitals reviewed.   Constitutional:       Appearance: Normal appearance. He is obese.      Comments: Body mass index is 41.09 kg/m².   Cardiovascular:      Rate and Rhythm: Normal rate.   Pulmonary:      Effort: Pulmonary effort is normal.   Neurological:      General: No focal deficit present.      Mental Status: He is alert. Mental status is at baseline.   Psychiatric:         Mood and Affect: Mood normal.         Behavior: Behavior normal.        /67   Pulse 88   Ht 182.9 cm (72\")   Wt (!) 137 kg (303 lb)   SpO2 96%   BMI 41.09 kg/m²      Labs and imaging    CMP:  Lab Results   Component Value Date    BUN 14 09/25/2021    CREATININE 0.96 09/25/2021    EGFRIFNONA 83 09/25/2021    EGFRIFAFRI 88 " 03/16/2017    BCR 14.6 09/25/2021     09/25/2021    K 4.6 09/25/2021    CO2 25.4 09/25/2021    CALCIUM 9.8 09/25/2021    PROTENTOTREF 7.4 03/16/2017    ALBUMIN 4.60 09/25/2021    LABGLOBREF 2.7 03/16/2017    LABIL2 1.7 03/16/2017    BILITOT 0.6 09/25/2021    ALKPHOS 63 09/25/2021    AST 22 09/25/2021    ALT 34 09/25/2021     Lipid Panel:  Lab Results   Component Value Date    CHOL 165 09/05/2020    TRIG 102 09/05/2020    HDL 29 (L) 09/05/2020    VLDL 20.4 09/05/2020     (H) 09/05/2020     HbA1c:  Lab Results   Component Value Date    HGBA1C 6.1 01/31/2022    HGBA1C 6.10 (H) 09/25/2021     Glucose:    Lab Results   Component Value Date    POCGLU 92 01/31/2022     TSH:  Lab Results   Component Value Date    TSH 1.070 09/25/2021     Lab Results   Component Value Date    PROLACTIN 8.14 09/25/2021    PROLACTIN 4.64 05/21/2021    PROLACTIN 9.82 01/18/2021    PROLACTIN 0.78 (L) 09/05/2020    PROLACTIN 0.70 (L) 05/30/2020    PROLACTIN 126.00 (H) 03/04/2020    PROLACTIN 115.00 (H) 02/22/2020    PROLACTIN 34.9 (H) 01/23/2020    TESTOSTERONE 34.5 (L) 01/23/2020    TESTOSTEROTT 165 (L) 09/25/2021    TESTOSTEROTT 259 (L) 05/21/2021    TESTOSTEROTT 197 (L) 01/18/2021     PROCEDURE: MRI PITUITARY W WO CONTRAST-     HISTORY: Pituitary adenoma, known or suspected; D35.2-Benign neoplasm of  pituitary gland     PROCEDURE: Multiplanar multisequence imaging of the brain was performed  both before and following the administration of 15 mL MultiHance  intravenous contrast.     COMPARISON: 8/27/2020 and 2/27/2020.     FINDINGS: There are no significant white matter abnormalities. There is  no mass, mass effect or midline shift. There is no hydrocephalus. There  are no areas of restricted diffusion. There is no pathologic contrast  enhancement.     The midbrain, yasmin, cerebellum and craniocervical junction are  unremarkable. Dynamic imaging through the pituitary gland demonstrates  interval decrease in size of the lesion in the  left aspects of the  pituitary gland which measures approximately 2 mm on today's exam. The  major intracranial vasculature demonstrates the expected flow related  signal. The paranasal sinuses are clear.     IMPRESSION:  Decrease in size of the patient's pituitary adenoma which  measures 2 mm on today's exam.        This report was finalized on 8/10/2021 3:33 PM by April Nagy M.D.    Assessment and plan  Diagnoses and all orders for this visit:    1. Prediabetes (Primary)  Stable with A1c 6.1. Not currently on Trulicity as he was concerned for MEN as his sister had pancreatic cancer.   Weight loss and diet discussed. Pt will initiate this in coming weeks/months.   Can monitor for now with lifestyle changes. I thinks MEN is low likelihood but checking labs.   -     POC Glucose, Blood  -     POC Glycosylated Hemoglobin (Hb A1C)    2. Pituitary microadenoma (HCC)  MRI from 8/10/2021 showed decrease in size of the pituitary adenoma, down to 2 mm.  Repeat in 1 year. If stable, can d/c monitoring.   -     Prolactin; Future  -     Calcitonin; Future    3. Hyperprolactinemia (HCC)  Has been controlled on cabergoline 0.25 mg once every two weeks.   Recheck level.   -     Prolactin; Future    4. Class 3 severe obesity due to excess calories with serious comorbidity and body mass index (BMI) of 40.0 to 44.9 in adult (HCC)  BMI up to 41.1.   Pt will start exercising/watching diet more closely.     5. Hypogonadism in male  Now on testosterone cypionate 200 mg Q 2 weeks with minimal improvement in symptoms. Started a few months ago. Recheck midpoint testosterone level and PSA.  -     Comprehensive Metabolic Panel; Future  -     Testosterone; Future  -     PSA DIAGNOSTIC; Future    6. Mixed hyperlipidemia  Check fasting lipid panel.   -     Lipid Panel; Future    7. Chronic fatigue  Multifactorial - stress, prediabetes, weight, depression, hypogonadism. Screen labs for causes for fatigue.   -     TSH; Future  -     CBC  (No Diff); Future  -     T4, Free; Future  -     PTH, Intact; Future  -     Calcium, Ionized; Future  -     Vitamin D 25 Hydroxy; Future  -     Vitamin B12; Future    8. Family history of pancreatic cancer  Pt has prolactinoma. Sister with pancreatic cancer. I think he is low likelihood to have MEN as his sister had no known thyroid or parathyroid issues, however will screen for possible other syndromes with MEN with calcitonin, PTH, ionized calcium.         A total of 42 minutes was spent in preparation for the visit, chart and lab review, with the patient in the office discussing diabetes, prolactin, potential for MEN, weight, fatigue and in management/evaluation of these as well as completion of the visit/documentation on 01/31/2022.          Return in about 4 months (around 5/31/2022) for next scheduled follow up. The patient was instructed to contact the clinic with any interval questions or concerns.    Elma Thomas, DO   Endocrinologist    Please note that portions of this note were completed with a voice recognition program.

## 2022-02-05 ENCOUNTER — LAB (OUTPATIENT)
Dept: LAB | Facility: HOSPITAL | Age: 52
End: 2022-02-05

## 2022-02-05 DIAGNOSIS — E22.1 HYPERPROLACTINEMIA: ICD-10-CM

## 2022-02-05 DIAGNOSIS — D35.2 PITUITARY MICROADENOMA: ICD-10-CM

## 2022-02-05 DIAGNOSIS — E78.2 MIXED HYPERLIPIDEMIA: ICD-10-CM

## 2022-02-05 DIAGNOSIS — R53.82 CHRONIC FATIGUE: ICD-10-CM

## 2022-02-05 DIAGNOSIS — E29.1 HYPOGONADISM IN MALE: ICD-10-CM

## 2022-02-05 LAB
25(OH)D3 SERPL-MCNC: 25.4 NG/ML (ref 30–100)
ALBUMIN SERPL-MCNC: 4.5 G/DL (ref 3.5–5.2)
ALBUMIN/GLOB SERPL: 2 G/DL
ALP SERPL-CCNC: 61 U/L (ref 39–117)
ALT SERPL W P-5'-P-CCNC: 37 U/L (ref 1–41)
ANION GAP SERPL CALCULATED.3IONS-SCNC: 8 MMOL/L (ref 5–15)
AST SERPL-CCNC: 27 U/L (ref 1–40)
BILIRUB SERPL-MCNC: 0.8 MG/DL (ref 0–1.2)
BUN SERPL-MCNC: 9 MG/DL (ref 6–20)
BUN/CREAT SERPL: 8.8 (ref 7–25)
CA-I BLD-MCNC: 5.1 MG/DL
CA-I SERPL ISE-MCNC: 1.28 MMOL/L (ref 1.1–1.35)
CALCIUM SPEC-SCNC: 8.9 MG/DL (ref 8.6–10.5)
CHLORIDE SERPL-SCNC: 105 MMOL/L (ref 98–107)
CHOLEST SERPL-MCNC: 229 MG/DL (ref 0–200)
CO2 SERPL-SCNC: 26 MMOL/L (ref 22–29)
CREAT SERPL-MCNC: 1.02 MG/DL (ref 0.76–1.27)
DEPRECATED RDW RBC AUTO: 41.8 FL (ref 37–54)
ERYTHROCYTE [DISTWIDTH] IN BLOOD BY AUTOMATED COUNT: 12.7 % (ref 12.3–15.4)
GFR SERPL CREATININE-BSD FRML MDRD: 77 ML/MIN/1.73
GLOBULIN UR ELPH-MCNC: 2.3 GM/DL
GLUCOSE SERPL-MCNC: 119 MG/DL (ref 65–99)
HCT VFR BLD AUTO: 51.9 % (ref 37.5–51)
HDLC SERPL-MCNC: 27 MG/DL (ref 40–60)
HGB BLD-MCNC: 17.2 G/DL (ref 13–17.7)
LDLC SERPL CALC-MCNC: 183 MG/DL (ref 0–100)
LDLC/HDLC SERPL: 6.7 {RATIO}
MCH RBC QN AUTO: 29.9 PG (ref 26.6–33)
MCHC RBC AUTO-ENTMCNC: 33.1 G/DL (ref 31.5–35.7)
MCV RBC AUTO: 90.1 FL (ref 79–97)
PLATELET # BLD AUTO: 228 10*3/MM3 (ref 140–450)
PMV BLD AUTO: 11.8 FL (ref 6–12)
POTASSIUM SERPL-SCNC: 5 MMOL/L (ref 3.5–5.2)
PROLACTIN SERPL-MCNC: 22.3 NG/ML (ref 4.04–15.2)
PROT SERPL-MCNC: 6.8 G/DL (ref 6–8.5)
PSA SERPL-MCNC: 0.31 NG/ML (ref 0–4)
PTH-INTACT SERPL-MCNC: 68.9 PG/ML (ref 15–65)
RBC # BLD AUTO: 5.76 10*6/MM3 (ref 4.14–5.8)
SODIUM SERPL-SCNC: 139 MMOL/L (ref 136–145)
T4 FREE SERPL-MCNC: 0.99 NG/DL (ref 0.93–1.7)
TESTOST SERPL-MCNC: 586 NG/DL (ref 193–740)
TRIGL SERPL-MCNC: 106 MG/DL (ref 0–150)
TSH SERPL DL<=0.05 MIU/L-ACNC: 1.41 UIU/ML (ref 0.27–4.2)
VIT B12 BLD-MCNC: 296 PG/ML (ref 211–946)
VLDLC SERPL-MCNC: 19 MG/DL (ref 5–40)
WBC NRBC COR # BLD: 5.83 10*3/MM3 (ref 3.4–10.8)

## 2022-02-05 PROCEDURE — 83970 ASSAY OF PARATHORMONE: CPT

## 2022-02-05 PROCEDURE — 82607 VITAMIN B-12: CPT

## 2022-02-05 PROCEDURE — 84153 ASSAY OF PSA TOTAL: CPT

## 2022-02-05 PROCEDURE — 84439 ASSAY OF FREE THYROXINE: CPT

## 2022-02-05 PROCEDURE — 84443 ASSAY THYROID STIM HORMONE: CPT

## 2022-02-05 PROCEDURE — 36415 COLL VENOUS BLD VENIPUNCTURE: CPT

## 2022-02-05 PROCEDURE — 82306 VITAMIN D 25 HYDROXY: CPT

## 2022-02-05 PROCEDURE — 80053 COMPREHEN METABOLIC PANEL: CPT

## 2022-02-05 PROCEDURE — 82330 ASSAY OF CALCIUM: CPT

## 2022-02-05 PROCEDURE — 85027 COMPLETE CBC AUTOMATED: CPT

## 2022-02-05 PROCEDURE — 80061 LIPID PANEL: CPT

## 2022-02-05 PROCEDURE — 84403 ASSAY OF TOTAL TESTOSTERONE: CPT

## 2022-02-05 PROCEDURE — 84146 ASSAY OF PROLACTIN: CPT

## 2022-02-05 PROCEDURE — 82308 ASSAY OF CALCITONIN: CPT

## 2022-02-07 LAB — CALCIT SERPL-MCNC: <2 PG/ML (ref 0–8.4)

## 2022-02-08 DIAGNOSIS — E22.1 HYPERPROLACTINEMIA: ICD-10-CM

## 2022-02-08 DIAGNOSIS — E55.9 VITAMIN D DEFICIENCY: Primary | ICD-10-CM

## 2022-02-08 RX ORDER — MELATONIN
1000 DAILY
COMMUNITY

## 2022-02-08 RX ORDER — CABERGOLINE 0.5 MG/1
0.25 TABLET ORAL
Qty: 8 TABLET | Refills: 3 | Status: SHIPPED | OUTPATIENT
Start: 2022-02-08 | End: 2022-06-01

## 2022-02-25 DIAGNOSIS — E29.1 HYPOGONADISM IN MALE: Primary | ICD-10-CM

## 2022-02-25 RX ORDER — TESTOSTERONE CYPIONATE 200 MG/ML
200 INJECTION, SOLUTION INTRAMUSCULAR ONCE
Status: CANCELLED | OUTPATIENT
Start: 2022-02-25 | End: 2022-02-25

## 2022-03-08 ENCOUNTER — OFFICE VISIT (OUTPATIENT)
Dept: INTERNAL MEDICINE | Facility: CLINIC | Age: 52
End: 2022-03-08

## 2022-03-08 VITALS
HEIGHT: 72 IN | HEART RATE: 73 BPM | WEIGHT: 310 LBS | DIASTOLIC BLOOD PRESSURE: 83 MMHG | SYSTOLIC BLOOD PRESSURE: 131 MMHG | TEMPERATURE: 97.8 F | BODY MASS INDEX: 41.99 KG/M2 | RESPIRATION RATE: 18 BRPM | OXYGEN SATURATION: 99 %

## 2022-03-08 DIAGNOSIS — U09.9 LONG COVID: Primary | ICD-10-CM

## 2022-03-08 PROCEDURE — 99214 OFFICE O/P EST MOD 30 MIN: CPT | Performed by: INTERNAL MEDICINE

## 2022-03-08 RX ORDER — ALBUTEROL SULFATE 90 UG/1
2 AEROSOL, METERED RESPIRATORY (INHALATION) EVERY 4 HOURS PRN
Qty: 8 G | Refills: 0 | Status: SHIPPED | OUTPATIENT
Start: 2022-03-08 | End: 2022-12-05

## 2022-03-08 NOTE — PROGRESS NOTES
Chief Complaint   Patient presents with   • Fatigue     Worse since COVID   • Breathing Problem     Has changed since COVID, O2 levels at times are in the low 90s at home   • Leg Pain     Calf pain       Subjective     History of Present Illness   Harish Anaya is a 51 y.o. male presenting for follow up with concerns of long COVID syndrome with increased fatigue.  He noticed intermittent episodes when he might gasp for air.  Has left leg cramps intermittently was bilateral.  Wife has noticed slightly lower O2 levels around 92% when awake resting.  He has tried CPAP in the past which has not worked well for him.      Has been following with endocrinology.  Currently on moderate dose of testosterone.  Prolactin has been controlled with cabergoline.  He thought he was feeling better until he had COVID-19 infection on Jan 5th.  His initial infection just involved fatigue, night sweats, and drainage.  He did not have much cough or fevers.      Has also noticed some intermittent chest pains since COVID as well.  Pains seem to be random but are over the left side of the chest.  He is able to use a treadmill without any issue at home and runs for a fair amount of time without pain.    The following portions of the patient's history were reviewed and updated as appropriate: allergies, current medications, past family history, past medical history, past social history, past surgical history and problem list.    Review of Systems   Constitutional: Positive for fatigue. Negative for chills and fever.   HENT: Negative for congestion, ear pain, rhinorrhea, sinus pressure and sore throat.    Eyes: Negative for visual disturbance.   Respiratory: Positive for chest tightness. Negative for cough, shortness of breath and wheezing.    Cardiovascular: Positive for chest pain. Negative for palpitations and leg swelling.   Gastrointestinal: Negative for abdominal pain, blood in stool, constipation, diarrhea, nausea and vomiting.    Endocrine: Negative for polydipsia and polyuria.   Genitourinary: Negative for dysuria and hematuria.   Musculoskeletal: Negative for arthralgias and back pain.   Skin: Negative for rash.   Neurological: Negative for dizziness, light-headedness, numbness and headaches.   Psychiatric/Behavioral: Negative for dysphoric mood and sleep disturbance. The patient is not nervous/anxious.        No Known Allergies    Past Medical History:   Diagnosis Date   • Allergic rhinitis    • BMI 38.0-38.9,adult    • Diabetes mellitus (HCC)     borderline   • Diverticulosis of colon without hemorrhage    • Dizziness    • Heartburn    • Hyperlipidemia    • Malaise and fatigue    • Pituitary adenoma (HCC)    • Polyuria    • PONV (postoperative nausea and vomiting)    • Sore throat    • Vegetarian diet     since 3 wk ago       Social History     Socioeconomic History   • Marital status:    Tobacco Use   • Smoking status: Former Smoker   • Smokeless tobacco: Never Used   Vaping Use   • Vaping Use: Never used   Substance and Sexual Activity   • Alcohol use: No   • Drug use: No   • Sexual activity: Defer        Past Surgical History:   Procedure Laterality Date   • APPENDECTOMY     • CHOLECYSTECTOMY     • COLONOSCOPY      complete    • COLONOSCOPY N/A 1/17/2020    Procedure: COLONOSCOPY;  Surgeon: Ruddy Lara MD;  Location: [facility] ENDOSCOPY;  Service: Gastroenterology   • ELBOW PROCEDURE         Family History   Problem Relation Age of Onset   • Depression Mother    • Cancer Maternal Grandmother    • Melanoma Maternal Grandmother    • Parkinsonism Maternal Grandfather    • Diabetes Maternal Aunt    • Alcohol abuse Maternal Uncle    • Liver disease Maternal Uncle    • Colon cancer Neg Hx    • Cirrhosis Neg Hx    • Liver cancer Neg Hx          Current Outpatient Medications:   •  amitriptyline (ELAVIL) 10 MG tablet, Take 1 tablet by mouth At Night As Needed for Sleep., Disp: 30 tablet, Rfl: 2  •  Ascorbic Acid (VITAMIN C GUMMIE  "PO), Take  by mouth Daily., Disp: , Rfl:   •  cabergoline (DOSTINEX) 0.5 MG tablet, Take 0.5 tablets by mouth Every 14 (Fourteen) Days., Disp: 8 tablet, Rfl: 3  •  cholecalciferol (VITAMIN D3) 25 MCG (1000 UT) tablet, Take 1,000 Units by mouth Daily., Disp: , Rfl:   •  Multiple Vitamins-Minerals (MULTIVITAMIN WITH MINERALS) tablet tablet, Take 1 tablet by mouth 2 (Two) Times a Day., Disp: , Rfl:   •  Needle, Disp, (Hypodermic Needle) 22G X 1\" misc, Use for testosterone injection, Disp: 6 each, Rfl: 3  •  Needle, Disp, (MONOJECT HYPO 18GX1\") 18G X 1\" misc, Use for testosterone injection, Disp: 6 each, Rfl: 3  •  Probiotic capsule, Take 1 capsule by mouth Daily., Disp: 30 capsule, Rfl: 1  •  Testosterone Cypionate 200 MG/ML solution, Inject 1 mL as directed Every 14 (Fourteen) Days., Disp: , Rfl:   •  albuterol sulfate  (90 Base) MCG/ACT inhaler, Inhale 2 puffs Every 4 (Four) Hours As Needed for Wheezing., Disp: 8 g, Rfl: 0    Objective   /83   Pulse 73   Temp 97.8 °F (36.6 °C)   Resp 18   Ht 182.9 cm (72\")   Wt (!) 141 kg (310 lb)   SpO2 99%   BMI 42.04 kg/m²     Physical Exam  Vitals and nursing note reviewed.   Constitutional:       Appearance: Normal appearance. He is well-developed. He is obese.   HENT:      Head: Normocephalic and atraumatic.      Nose: Nose normal.      Mouth/Throat:      Mouth: Mucous membranes are moist.      Pharynx: No oropharyngeal exudate.   Eyes:      General: No scleral icterus.     Conjunctiva/sclera: Conjunctivae normal.      Pupils: Pupils are equal, round, and reactive to light.   Neck:      Thyroid: No thyromegaly.   Cardiovascular:      Rate and Rhythm: Normal rate and regular rhythm.      Heart sounds: Normal heart sounds. No murmur heard.    No friction rub. No gallop.   Pulmonary:      Effort: Pulmonary effort is normal. No respiratory distress.      Breath sounds: Normal breath sounds. No wheezing.   Abdominal:      General: Bowel sounds are normal. There " is no distension.      Palpations: Abdomen is soft.      Tenderness: There is no abdominal tenderness.   Musculoskeletal:         General: No deformity or signs of injury.      Cervical back: Normal range of motion and neck supple.   Lymphadenopathy:      Cervical: No cervical adenopathy.   Skin:     General: Skin is warm and dry.      Findings: No rash.   Neurological:      Mental Status: He is alert and oriented to person, place, and time.   Psychiatric:         Mood and Affect: Mood normal.         Behavior: Behavior normal.         Assessment/Plan   Diagnoses and all orders for this visit:    1. Long COVID (Primary)  -     albuterol sulfate  (90 Base) MCG/ACT inhaler; Inhale 2 puffs Every 4 (Four) Hours As Needed for Wheezing.  Dispense: 8 g; Refill: 0  -     Holter Monitor - 48 Hour          Discussion Summary:  Patient is a 51 y.o. male presenting for follow up with persistent fatigue symptoms following COVID-19 infection. He had a thorough work up with labs recently from endocrinology and most levels were in reasonable control other than low vitamin D.  Cardiac and pulmonary function may have been affected.  Try albuterol prior to exertion and with any SOA episodes.  Obtain Holter monitor for 72 hours and monitor for any symptoms of chest pain.     Calf pain may be related hydration or electrolyte imbalances but does not seem to be any DVT.     Follow up:  Return in about 3 months (around 6/8/2022) for Annual physical.

## 2022-05-25 DIAGNOSIS — R79.89 HIGH SERUM PARATHYROID HORMONE (PTH): Primary | ICD-10-CM

## 2022-05-25 DIAGNOSIS — E78.49 OTHER HYPERLIPIDEMIA: Primary | ICD-10-CM

## 2022-05-28 ENCOUNTER — LAB (OUTPATIENT)
Dept: LAB | Facility: HOSPITAL | Age: 52
End: 2022-05-28

## 2022-05-28 DIAGNOSIS — E22.1 HYPERPROLACTINEMIA: ICD-10-CM

## 2022-05-28 DIAGNOSIS — R79.89 HIGH SERUM PARATHYROID HORMONE (PTH): ICD-10-CM

## 2022-05-28 DIAGNOSIS — E55.9 VITAMIN D DEFICIENCY: ICD-10-CM

## 2022-05-28 LAB
25(OH)D3 SERPL-MCNC: 27 NG/ML (ref 30–100)
ALBUMIN SERPL-MCNC: 4.5 G/DL (ref 3.5–5.2)
ALBUMIN/GLOB SERPL: 2 G/DL
ALP SERPL-CCNC: 68 U/L (ref 39–117)
ALT SERPL W P-5'-P-CCNC: 40 U/L (ref 1–41)
ANION GAP SERPL CALCULATED.3IONS-SCNC: 14 MMOL/L (ref 5–15)
AST SERPL-CCNC: 35 U/L (ref 1–40)
BILIRUB SERPL-MCNC: 0.5 MG/DL (ref 0–1.2)
BUN SERPL-MCNC: 18 MG/DL (ref 6–20)
BUN/CREAT SERPL: 16.7 (ref 7–25)
CA-I BLD-MCNC: 5 MG/DL (ref 4.6–5.4)
CA-I SERPL ISE-MCNC: 1.26 MMOL/L (ref 1.15–1.35)
CALCIUM SPEC-SCNC: 9.1 MG/DL (ref 8.6–10.5)
CHLORIDE SERPL-SCNC: 104 MMOL/L (ref 98–107)
CHOLEST SERPL-MCNC: 199 MG/DL (ref 0–200)
CO2 SERPL-SCNC: 22 MMOL/L (ref 22–29)
CREAT SERPL-MCNC: 1.08 MG/DL (ref 0.76–1.27)
EGFRCR SERPLBLD CKD-EPI 2021: 83.1 ML/MIN/1.73
GLOBULIN UR ELPH-MCNC: 2.2 GM/DL
GLUCOSE SERPL-MCNC: 80 MG/DL (ref 65–99)
HDLC SERPL-MCNC: 23 MG/DL (ref 40–60)
LDLC SERPL CALC-MCNC: 151 MG/DL (ref 0–100)
LDLC/HDLC SERPL: 6.47 {RATIO}
POTASSIUM SERPL-SCNC: 5 MMOL/L (ref 3.5–5.2)
PROLACTIN SERPL-MCNC: 9.47 NG/ML (ref 4.04–15.2)
PROT SERPL-MCNC: 6.7 G/DL (ref 6–8.5)
PTH-INTACT SERPL-MCNC: 43.5 PG/ML (ref 15–65)
SODIUM SERPL-SCNC: 140 MMOL/L (ref 136–145)
TRIGL SERPL-MCNC: 136 MG/DL (ref 0–150)
VLDLC SERPL-MCNC: 25 MG/DL (ref 5–40)

## 2022-05-28 PROCEDURE — 36415 COLL VENOUS BLD VENIPUNCTURE: CPT

## 2022-05-28 PROCEDURE — 82306 VITAMIN D 25 HYDROXY: CPT

## 2022-05-28 PROCEDURE — 80053 COMPREHEN METABOLIC PANEL: CPT

## 2022-05-28 PROCEDURE — 80061 LIPID PANEL: CPT | Performed by: INTERNAL MEDICINE

## 2022-05-28 PROCEDURE — 84146 ASSAY OF PROLACTIN: CPT

## 2022-05-28 PROCEDURE — 82330 ASSAY OF CALCIUM: CPT

## 2022-05-28 PROCEDURE — 83970 ASSAY OF PARATHORMONE: CPT

## 2022-06-01 ENCOUNTER — OFFICE VISIT (OUTPATIENT)
Dept: ENDOCRINOLOGY | Facility: CLINIC | Age: 52
End: 2022-06-01

## 2022-06-01 VITALS
BODY MASS INDEX: 39.68 KG/M2 | WEIGHT: 293 LBS | SYSTOLIC BLOOD PRESSURE: 118 MMHG | OXYGEN SATURATION: 96 % | HEIGHT: 72 IN | HEART RATE: 87 BPM | DIASTOLIC BLOOD PRESSURE: 78 MMHG

## 2022-06-01 DIAGNOSIS — Z87.898 HISTORY OF PREDIABETES: ICD-10-CM

## 2022-06-01 DIAGNOSIS — E22.1 HYPERPROLACTINEMIA: Primary | ICD-10-CM

## 2022-06-01 DIAGNOSIS — E29.1 HYPOGONADISM IN MALE: ICD-10-CM

## 2022-06-01 DIAGNOSIS — E66.01 CLASS 2 SEVERE OBESITY DUE TO EXCESS CALORIES WITH SERIOUS COMORBIDITY AND BODY MASS INDEX (BMI) OF 39.0 TO 39.9 IN ADULT: ICD-10-CM

## 2022-06-01 DIAGNOSIS — D35.2 PITUITARY MICROADENOMA: ICD-10-CM

## 2022-06-01 DIAGNOSIS — E55.9 VITAMIN D DEFICIENCY: ICD-10-CM

## 2022-06-01 LAB
EXPIRATION DATE: NORMAL
EXPIRATION DATE: NORMAL
GLUCOSE BLDC GLUCOMTR-MCNC: 97 MG/DL (ref 70–130)
HBA1C MFR BLD: 5.3 %
Lab: NORMAL
Lab: NORMAL

## 2022-06-01 PROCEDURE — 82947 ASSAY GLUCOSE BLOOD QUANT: CPT | Performed by: INTERNAL MEDICINE

## 2022-06-01 PROCEDURE — 83036 HEMOGLOBIN GLYCOSYLATED A1C: CPT | Performed by: INTERNAL MEDICINE

## 2022-06-01 PROCEDURE — 99214 OFFICE O/P EST MOD 30 MIN: CPT | Performed by: INTERNAL MEDICINE

## 2022-06-01 RX ORDER — TESTOSTERONE CYPIONATE 200 MG/ML
1 VIAL (ML) INTRAMUSCULAR
Qty: 7 ML | Refills: 1 | Status: SHIPPED | OUTPATIENT
Start: 2022-06-01 | End: 2022-12-29 | Stop reason: SDUPTHER

## 2022-06-01 RX ORDER — CABERGOLINE 0.5 MG/1
0.25 TABLET ORAL
Qty: 8 TABLET | Refills: 3 | Status: SHIPPED | OUTPATIENT
Start: 2022-06-01 | End: 2023-06-01

## 2022-06-01 NOTE — PROGRESS NOTES
"Chief Complaint   Patient presents with   • Prediabetes        HPI   Harish Anaya is a 52 y.o. male had concerns including Prediabetes.      Patient has lost 17 pounds since March.  A1c is down to 5.3.  Is exercising 5 days per week. Half hour treadmill and half hour resistance training.   Has reduced portions, cut carbs  - avoiding breads, rice, noodles, potatoes    Isn't taking vitamin D consistently.     Feeling much improved.     The following portions of the patient's history were reviewed and updated as appropriate: allergies, current medications, past family history, past medical history, past social history, past surgical history and problem list.    Review of Systems   Constitutional: Negative.    Endocrine: Negative.         /78   Pulse 87   Ht 182.9 cm (72\")   Wt 133 kg (293 lb)   SpO2 96%   BMI 39.74 kg/m²      Physical Exam  Vitals reviewed.   Constitutional:       Appearance: Normal appearance. He is obese.      Comments: Body mass index is 39.74 kg/m².   Cardiovascular:      Rate and Rhythm: Normal rate.   Pulmonary:      Effort: Pulmonary effort is normal.   Neurological:      General: No focal deficit present.      Mental Status: He is alert. Mental status is at baseline.   Psychiatric:         Mood and Affect: Mood normal.         Behavior: Behavior normal.          CMP  Lab Results   Component Value Date    GLUCOSE 80 05/28/2022    BUN 18 05/28/2022    CREATININE 1.08 05/28/2022    EGFRIFNONA 77 02/05/2022    EGFRIFAFRI 88 03/16/2017    BCR 16.7 05/28/2022    K 5.0 05/28/2022    CO2 22.0 05/28/2022    CALCIUM 9.1 05/28/2022    PROTENTOTREF 7.4 03/16/2017    ALBUMIN 4.50 05/28/2022    LABIL2 1.7 03/16/2017    AST 35 05/28/2022    ALT 40 05/28/2022      CBC w/DIFF   Lab Results   Component Value Date    WBC 5.83 02/05/2022    RBC 5.76 02/05/2022    HGB 17.2 02/05/2022    HCT 51.9 (H) 02/05/2022    MCV 90.1 02/05/2022    MCH 29.9 02/05/2022    MCHC 33.1 02/05/2022    RDW 12.7 " 02/05/2022    RDWSD 41.8 02/05/2022    MPV 11.8 02/05/2022     02/05/2022    NEUTRORELPCT 66.9 01/18/2021    LYMPHORELPCT 22.7 01/18/2021    MONORELPCT 8.5 01/18/2021    EOSRELPCT 1.2 01/18/2021    BASORELPCT 0.4 01/18/2021    AUTOIGPER 0.3 01/18/2021    NEUTROABS 4.88 01/18/2021    LYMPHSABS 1.66 01/18/2021    MONOSABS 0.62 01/18/2021    EOSABS 0.09 01/18/2021    BASOSABS 0.03 01/18/2021    AUTOIGNUM 0.02 01/18/2021    NRBC 0.0 01/18/2021     TSH  Lab Results   Component Value Date    TSH 1.410 02/05/2022    TSH 1.070 09/25/2021    TSH 1.340 01/18/2021       T4  Lab Results   Component Value Date    FREET4 0.99 02/05/2022    FREET4 1.31 01/18/2021    FREET4 0.96 03/04/2020     Lab Results   Component Value Date    PROLACTIN 9.47 05/28/2022    PROLACTIN 22.30 (H) 02/05/2022    PROLACTIN 8.14 09/25/2021    PROLACTIN 4.64 05/21/2021    PROLACTIN 9.82 01/18/2021    PROLACTIN 0.78 (L) 09/05/2020    PROLACTIN 0.70 (L) 05/30/2020    PROLACTIN 126.00 (H) 03/04/2020    PROLACTIN 115.00 (H) 02/22/2020    PROLACTIN 34.9 (H) 01/23/2020    TESTOSTEROTT 165 (L) 09/25/2021    TESTOSTERONE 586.00 02/05/2022    PSA 0.307 02/05/2022     Hemoglobin A1C   Date Value Ref Range Status   06/01/2022 5.3 % Final   01/31/2022 6.1 % Final   09/25/2021 6.10 (H) 4.80 - 5.60 % Final   05/21/2021 5.56 4.80 - 5.60 % Final   01/18/2021 5.80 (H) 4.80 - 5.60 % Final     Lab Results   Component Value Date    PTH 43.5 05/28/2022    PTH 68.9 (H) 02/05/2022    IVOJ61CM 27.0 (L) 05/28/2022    OGIJ31FO 25.4 (L) 02/05/2022    [id number] 26.1 (L) 01/11/2020     PROCEDURE: MRI PITUITARY W WO CONTRAST-     HISTORY: Pituitary adenoma, known or suspected; D35.2-Benign neoplasm of  pituitary gland     PROCEDURE: Multiplanar multisequence imaging of the brain was performed  both before and following the administration of 15 mL MultiHance  intravenous contrast.     COMPARISON: 8/27/2020 and 2/27/2020.     FINDINGS: There are no significant white matter  abnormalities. There is  no mass, mass effect or midline shift. There is no hydrocephalus. There  are no areas of restricted diffusion. There is no pathologic contrast  enhancement.     The midbrain, yasmin, cerebellum and craniocervical junction are  unremarkable. Dynamic imaging through the pituitary gland demonstrates  interval decrease in size of the lesion in the left aspects of the  pituitary gland which measures approximately 2 mm on today's exam. The  major intracranial vasculature demonstrates the expected flow related  signal. The paranasal sinuses are clear.     IMPRESSION:  Decrease in size of the patient's pituitary adenoma which  measures 2 mm on today's exam.        This report was finalized on 8/10/2021 3:33 PM by April Nagy M.D.      Assessment and Plan    Diagnoses and all orders for this visit:    1. Hyperprolactinemia (HCC) (Primary)  On cabergoline half a tab (0.25 mg) weekly and levels now back in normal range.   Continue current dose.   -     cabergoline (DOSTINEX) 0.5 MG tablet; Take 0.5 tablets by mouth Every 7 (Seven) Days.  Dispense: 8 tablet; Refill: 3    2. Pituitary microadenoma (HCC)  MRI from 8/10/2021 showed decrease in size of the pituitary adenoma, down to 2 mm.  Prolactin levels responding appropriately. Can d/c monitoring.      3. History of prediabetes  Resolved with weight loss. A1c now 5.3. Continue exercise.   -     POC Glucose, Blood  -     POC Glycosylated Hemoglobin (Hb A1C)    4. Vitamin D deficiency  Not taking vitamin D consistently. Try to take 1000 IU daily.     5. Hypogonadism in male  Controlled on testosterone 200 mg q 2 weeks. Levels in good range when last checked. Monitor yearly. Rishabh reviewed. CSA on file.     6. Class 2 severe obesity due to excess calories with serious comorbidity and body mass index (BMI) of 39.0 to 39.9 in adult (HCC)  BMI down to 17 lbs with weight loss due to dietary changes and exercise. Congratulated on efforts and encouraged  to continue.        Return in about 6 months (around 12/1/2022) for next scheduled follow up. The patient was instructed to contact the clinic with any interval questions or concerns.    Elma Thomas, DO   Endocrinologist    Please note that portions of this note were completed with a voice recognition program.

## 2022-06-08 ENCOUNTER — OFFICE VISIT (OUTPATIENT)
Dept: INTERNAL MEDICINE | Facility: CLINIC | Age: 52
End: 2022-06-08

## 2022-06-08 VITALS
HEIGHT: 72 IN | SYSTOLIC BLOOD PRESSURE: 135 MMHG | HEART RATE: 84 BPM | WEIGHT: 293 LBS | TEMPERATURE: 97.8 F | DIASTOLIC BLOOD PRESSURE: 73 MMHG | RESPIRATION RATE: 16 BRPM | BODY MASS INDEX: 39.68 KG/M2 | OXYGEN SATURATION: 99 %

## 2022-06-08 DIAGNOSIS — Z00.00 ENCOUNTER FOR PREVENTIVE HEALTH EXAMINATION: Primary | ICD-10-CM

## 2022-06-08 DIAGNOSIS — D35.2 PROLACTINOMA: ICD-10-CM

## 2022-06-08 DIAGNOSIS — E78.5 HYPERLIPIDEMIA, UNSPECIFIED HYPERLIPIDEMIA TYPE: ICD-10-CM

## 2022-06-08 DIAGNOSIS — R73.03 PREDIABETES: ICD-10-CM

## 2022-06-08 DIAGNOSIS — G47.09 OTHER INSOMNIA: ICD-10-CM

## 2022-06-08 DIAGNOSIS — E29.1 HYPOGONADISM IN MALE: ICD-10-CM

## 2022-06-08 PROCEDURE — 99396 PREV VISIT EST AGE 40-64: CPT | Performed by: INTERNAL MEDICINE

## 2022-06-08 RX ORDER — AMITRIPTYLINE HYDROCHLORIDE 10 MG/1
10 TABLET, FILM COATED ORAL NIGHTLY PRN
Qty: 90 TABLET | Refills: 3 | Status: SHIPPED | OUTPATIENT
Start: 2022-06-08

## 2022-06-08 NOTE — PROGRESS NOTES
Chief Complaint   Patient presents with   • Annual Exam       Subjective     History of Present Illness   Harish Anaya is a 52 y.o. male presenting for annual physical.  Preventive health maintenance was reviewed and discussed today. Vaccines were updated. Joined [employer] and has noticed benefits with a fair amount of weight loss.  Cont to follow endocrinology and had complete set of labs in march which were reviewed. Had COVID in January, no major long term symptoms noted.     The following portions of the patient's history were reviewed and updated as appropriate: allergies, current medications, past family history, past medical history, past social history, past surgical history and problem list.    Review of Systems    No Known Allergies    Past Medical History:   Diagnosis Date   • Allergic rhinitis    • BMI 38.0-38.9,adult    • Diabetes mellitus (HCC)     borderline   • Diverticulosis of colon without hemorrhage    • Dizziness    • Heartburn    • Hyperlipidemia    • Hypopituitarism (HCC)    • Malaise and fatigue    • Pituitary adenoma (HCC)    • Polyuria    • PONV (postoperative nausea and vomiting)    • Sore throat    • Testosterone deficiency    • Vegetarian diet     since 3 wk ago   • Vitamin D deficiency 10/15/21       Social History     Socioeconomic History   • Marital status:    Tobacco Use   • Smoking status: Former Smoker     Years: 2.00     Quit date:      Years since quittin.4   • Smokeless tobacco: Never Used   Vaping Use   • Vaping Use: Never used   Substance and Sexual Activity   • Alcohol use: No   • Drug use: No   • Sexual activity: Defer        Past Surgical History:   Procedure Laterality Date   • APPENDECTOMY     • CHOLECYSTECTOMY     • COLONOSCOPY      complete    • COLONOSCOPY N/A 2020    Procedure: COLONOSCOPY;  Surgeon: Ruddy Lara MD;  Location: [facility] ENDOSCOPY;  Service: Gastroenterology   • ELBOW PROCEDURE         Family History   Problem  "Relation Age of Onset   • Depression Mother    • Cancer Maternal Grandmother    • Melanoma Maternal Grandmother    • Parkinsonism Maternal Grandfather    • Diabetes Maternal Aunt    • Alcohol abuse Maternal Uncle    • Liver disease Maternal Uncle    • Colon cancer Neg Hx    • Cirrhosis Neg Hx    • Liver cancer Neg Hx          Current Outpatient Medications:   •  albuterol sulfate  (90 Base) MCG/ACT inhaler, Inhale 2 puffs Every 4 (Four) Hours As Needed for Wheezing., Disp: 8 g, Rfl: 0  •  amitriptyline (ELAVIL) 10 MG tablet, Take 1 tablet by mouth At Night As Needed for Sleep., Disp: 90 tablet, Rfl: 3  •  Ascorbic Acid (VITAMIN C GUMMIE PO), Take  by mouth Daily., Disp: , Rfl:   •  cabergoline (DOSTINEX) 0.5 MG tablet, Take 0.5 tablets by mouth Every 7 (Seven) Days., Disp: 8 tablet, Rfl: 3  •  cholecalciferol (VITAMIN D3) 25 MCG (1000 UT) tablet, Take 1,000 Units by mouth Daily., Disp: , Rfl:   •  Multiple Vitamins-Minerals (MULTIVITAMIN WITH MINERALS) tablet tablet, Take 1 tablet by mouth 2 (Two) Times a Day., Disp: , Rfl:   •  Needle, Disp, (Hypodermic Needle) 22G X 1\" misc, Use for testosterone injection, Disp: 6 each, Rfl: 3  •  Needle, Disp, (MONOJECT HYPO 18GX1\") 18G X 1\" misc, Use for testosterone injection, Disp: 6 each, Rfl: 3  •  Probiotic capsule, Take 1 capsule by mouth Daily., Disp: 30 capsule, Rfl: 1  •  Testosterone Cypionate 200 MG/ML solution, Inject 1 mL as directed Every 14 (Fourteen) Days., Disp: 7 mL, Rfl: 1    Objective   /73   Pulse 84   Temp 97.8 °F (36.6 °C)   Resp 16   Ht 182.9 cm (72\")   Wt 133 kg (293 lb)   SpO2 99%   BMI 39.74 kg/m²     Physical Exam    Assessment & Plan   Diagnoses and all orders for this visit:    1. Encounter for preventive health examination (Primary)    2. Other insomnia  -     amitriptyline (ELAVIL) 10 MG tablet; Take 1 tablet by mouth At Night As Needed for Sleep.  Dispense: 90 tablet; Refill: 3    3. Prolactinoma (HCC)    4. Prediabetes    5. " Hyperlipidemia, unspecified hyperlipidemia type    6. Hypogonadism in male          Discussion Summary:  Patient is a 52 y.o. male presenting for annual physical    1. Preventive Health Maintenance  - Baseline labs are up-to-date or ordered per above.  - Vaccines reviewed and updated  - Preventive health measures were discussed including: healthy diet with increase in fruits and vegetables, regular exercise at least 3 times a week, safe sex practices, avoidance of drugs, tobacco, and alcohol, and regular seatbelt use.    2. Insomnia  - stable on low doses of amitriptyline as needed. Refills provided today.     3.  Prolactinoma / Hypogonadism  - cont following with endocrinology, good symptom control recently.       Follow up:  Return in about 1 year (around 6/8/2023) for Annual physical.     Patient Instructions:  Patient instructions were provided.

## 2022-07-20 DIAGNOSIS — Z87.898 HX OF MOTION SICKNESS: Primary | ICD-10-CM

## 2022-07-20 RX ORDER — SCOLOPAMINE TRANSDERMAL SYSTEM 1 MG/1
1 PATCH, EXTENDED RELEASE TRANSDERMAL
Qty: 4 PATCH | Refills: 0 | Status: SHIPPED | OUTPATIENT
Start: 2022-07-20 | End: 2022-12-05

## 2022-12-05 ENCOUNTER — OFFICE VISIT (OUTPATIENT)
Dept: ENDOCRINOLOGY | Facility: CLINIC | Age: 52
End: 2022-12-05

## 2022-12-05 VITALS
BODY MASS INDEX: 36.3 KG/M2 | DIASTOLIC BLOOD PRESSURE: 84 MMHG | HEIGHT: 72 IN | HEART RATE: 78 BPM | SYSTOLIC BLOOD PRESSURE: 130 MMHG | WEIGHT: 268 LBS | OXYGEN SATURATION: 96 %

## 2022-12-05 DIAGNOSIS — E22.1 HYPERPROLACTINEMIA: Primary | ICD-10-CM

## 2022-12-05 DIAGNOSIS — E29.1 HYPOGONADISM IN MALE: ICD-10-CM

## 2022-12-05 DIAGNOSIS — E66.01 CLASS 2 SEVERE OBESITY DUE TO EXCESS CALORIES WITH SERIOUS COMORBIDITY AND BODY MASS INDEX (BMI) OF 36.0 TO 36.9 IN ADULT: ICD-10-CM

## 2022-12-05 DIAGNOSIS — D35.2 PITUITARY MICROADENOMA: ICD-10-CM

## 2022-12-05 PROCEDURE — 99214 OFFICE O/P EST MOD 30 MIN: CPT | Performed by: INTERNAL MEDICINE

## 2022-12-05 NOTE — PROGRESS NOTES
"Chief Complaint   Patient presents with   • Hyperprolactinemia        HPI   Harish Anaya is a 52 y.o. male had concerns including Hyperprolactinemia.      Is exercising consistently.   Has lost 25 lbs since his last visit.   Hit a plateua for a bit but continued exercising and weight started trending down again.     Is feeling well but not as well as when he first started the testosterone treatment.   Last testosterone dose was Friday (3 days ago).    Sleep is ok. Takes elavil on occasion. Works but he isn't taking as consistently as he thinks he probably should.     The following portions of the patient's history were reviewed and updated as appropriate: allergies, current medications, past family history, past medical history, past social history, past surgical history and problem list.    Review of Systems   Constitutional: Negative.    Endocrine: Negative.         /84   Pulse 78   Ht 182.9 cm (72\")   Wt 122 kg (268 lb)   SpO2 96%   BMI 36.35 kg/m²      Physical Exam  Vitals reviewed.   Constitutional:       Appearance: Normal appearance.      Comments: Body mass index is 36.35 kg/m².   Cardiovascular:      Rate and Rhythm: Normal rate.   Pulmonary:      Effort: Pulmonary effort is normal.   Neurological:      General: No focal deficit present.      Mental Status: He is alert. Mental status is at baseline.   Psychiatric:         Mood and Affect: Mood normal.         Behavior: Behavior normal.              LABS AND IMAGING   CMP  Lab Results   Component Value Date    GLUCOSE 80 05/28/2022    BUN 18 05/28/2022    CREATININE 1.08 05/28/2022    EGFRIFNONA 77 02/05/2022    EGFRIFAFRI 88 03/16/2017    BCR 16.7 05/28/2022    K 5.0 05/28/2022    CO2 22.0 05/28/2022    CALCIUM 9.1 05/28/2022    PROTENTOTREF 7.4 03/16/2017    ALBUMIN 4.50 05/28/2022    LABIL2 1.7 03/16/2017    AST 35 05/28/2022    ALT 40 05/28/2022        CBC w/DIFF   Lab Results   Component Value Date    WBC 5.83 02/05/2022    RBC " 5.76 02/05/2022    HGB 17.2 02/05/2022    HCT 51.9 (H) 02/05/2022    MCV 90.1 02/05/2022    MCH 29.9 02/05/2022    MCHC 33.1 02/05/2022    RDW 12.7 02/05/2022    RDWSD 41.8 02/05/2022    MPV 11.8 02/05/2022     02/05/2022    NEUTRORELPCT 66.9 01/18/2021    LYMPHORELPCT 22.7 01/18/2021    MONORELPCT 8.5 01/18/2021    EOSRELPCT 1.2 01/18/2021    BASORELPCT 0.4 01/18/2021    AUTOIGPER 0.3 01/18/2021    NEUTROABS 4.88 01/18/2021    LYMPHSABS 1.66 01/18/2021    MONOSABS 0.62 01/18/2021    EOSABS 0.09 01/18/2021    BASOSABS 0.03 01/18/2021    AUTOIGNUM 0.02 01/18/2021    NRBC 0.0 01/18/2021     TSH  Lab Results   Component Value Date    TSH 1.410 02/05/2022    TSH 1.070 09/25/2021    TSH 1.340 01/18/2021     T4  Lab Results   Component Value Date    FREET4 0.99 02/05/2022    FREET4 1.31 01/18/2021    FREET4 0.96 03/04/2020     Lab Results   Component Value Date    PROLACTIN 9.47 05/28/2022    PROLACTIN 22.30 (H) 02/05/2022    PROLACTIN 8.14 09/25/2021    PROLACTIN 4.64 05/21/2021    PROLACTIN 9.82 01/18/2021    PROLACTIN 0.78 (L) 09/05/2020    PROLACTIN 0.70 (L) 05/30/2020    PROLACTIN 126.00 (H) 03/04/2020    PROLACTIN 115.00 (H) 02/22/2020    PROLACTIN 34.9 (H) 01/23/2020     Lab Results   Component Value Date    TESTOSTEROTT 165 (L) 09/25/2021    TESTOSTEROTT 259 (L) 05/21/2021    TESTOSTEROTT 197 (L) 01/18/2021    TESTOSTEROTT 247 (L) 09/05/2020    TESTOSTEROTT 59 (L) 01/11/2020    TESTOSTERONE 586.00 02/05/2022     Lab Results   Component Value Date    PSA 0.307 02/05/2022     Hemoglobin A1C   Date Value Ref Range Status   06/01/2022 5.3 % Final     PROCEDURE: MRI PITUITARY W WO CONTRAST-     HISTORY: Pituitary adenoma, known or suspected; D35.2-Benign neoplasm of  pituitary gland     PROCEDURE: Multiplanar multisequence imaging of the brain was performed  both before and following the administration of 15 mL MultiHance  intravenous contrast.     COMPARISON: 8/27/2020 and 2/27/2020.     FINDINGS: There are no  significant white matter abnormalities. There is  no mass, mass effect or midline shift. There is no hydrocephalus. There  are no areas of restricted diffusion. There is no pathologic contrast  enhancement.     The midbrain, yasmin, cerebellum and craniocervical junction are  unremarkable. Dynamic imaging through the pituitary gland demonstrates  interval decrease in size of the lesion in the left aspects of the  pituitary gland which measures approximately 2 mm on today's exam. The  major intracranial vasculature demonstrates the expected flow related  signal. The paranasal sinuses are clear.     IMPRESSION:  Decrease in size of the patient's pituitary adenoma which  measures 2 mm on today's exam.        This report was finalized on 8/10/2021 3:33 PM by April Nagy M.D.    Assessment and Plan    Diagnoses and all orders for this visit:    1. Hyperprolactinemia (HCC) (Primary)  Diagnosed 2020.  On cabergoline 0.5 mg weekly.  Recheck prolactin levels.  -     Prolactin; Future    2. Pituitary microadenoma (HCC)  MRI from 8/10/2021 showed decrease in size of the pituitary adenoma, down to 2 mm.  Prolactin levels responding appropriately. Can d/c monitoring.       3. Hypogonadism in male  Controlled on testosterone 200 mg every 2 weeks.  Recheck midpoint testosterone levels.  PSA last checked in February.  -     Testosterone; Future    4. Class 2 severe obesity due to excess calories with serious comorbidity and body mass index (BMI) of 36.0 to 36.9 in adult (HCC)  BMI down to 36.3 with a 25 pound weight loss since his last visit here.  Congratulated on efforts and encouraged to continue.       Return in about 6 months (around 6/5/2023) for next scheduled follow up. The patient was instructed to contact the clinic with any interval questions or concerns.    Elma Thomas, DO   Endocrinologist    Please note that portions of this note were completed with a voice recognition program.

## 2022-12-29 DIAGNOSIS — E29.1 HYPOGONADISM IN MALE: ICD-10-CM

## 2022-12-29 RX ORDER — TESTOSTERONE CYPIONATE 200 MG/ML
1 VIAL (ML) INTRAMUSCULAR
Qty: 7 ML | Refills: 0 | Status: SHIPPED | OUTPATIENT
Start: 2022-12-29 | End: 2023-01-17 | Stop reason: SDUPTHER

## 2023-01-03 ENCOUNTER — PRIOR AUTHORIZATION (OUTPATIENT)
Dept: ENDOCRINOLOGY | Facility: CLINIC | Age: 53
End: 2023-01-03
Payer: COMMERCIAL

## 2023-01-05 NOTE — TELEPHONE ENCOUNTER
Approvedtoday  CaseId:[id number];Status:Approved;Review Type:Prior Auth;Coverage Start Date:11/30/2022;Coverage End Date:01/05/2024;  Drug  Testosterone Cypionate 200MG/ML intramuscular solution  Form  [employer] Electronic PA Form (2017 [employer])

## 2023-01-13 ENCOUNTER — LAB (OUTPATIENT)
Dept: LAB | Facility: HOSPITAL | Age: 53
End: 2023-01-13
Payer: COMMERCIAL

## 2023-01-13 DIAGNOSIS — E22.1 HYPERPROLACTINEMIA: ICD-10-CM

## 2023-01-13 DIAGNOSIS — E29.1 HYPOGONADISM IN MALE: ICD-10-CM

## 2023-01-13 LAB
PROLACTIN SERPL-MCNC: 6.23 NG/ML (ref 4.04–15.2)
TESTOST SERPL-MCNC: 904 NG/DL (ref 193–740)

## 2023-01-13 PROCEDURE — 36415 COLL VENOUS BLD VENIPUNCTURE: CPT

## 2023-01-13 PROCEDURE — 84403 ASSAY OF TOTAL TESTOSTERONE: CPT

## 2023-01-13 PROCEDURE — 84146 ASSAY OF PROLACTIN: CPT

## 2023-01-17 DIAGNOSIS — E29.1 HYPOGONADISM IN MALE: ICD-10-CM

## 2023-01-17 RX ORDER — TESTOSTERONE CYPIONATE 200 MG/ML
0.8 VIAL (ML) INTRAMUSCULAR
Qty: 13 ML | Refills: 1 | Status: SHIPPED | OUTPATIENT
Start: 2023-01-17

## 2023-03-31 DIAGNOSIS — E29.1 HYPOGONADISM IN MALE: ICD-10-CM

## 2023-03-31 RX ORDER — TESTOSTERONE CYPIONATE 200 MG/ML
INJECTION, SOLUTION INTRAMUSCULAR
Qty: 10 ML | Refills: 0 | OUTPATIENT
Start: 2023-03-31

## 2023-05-30 ENCOUNTER — TELEPHONE (OUTPATIENT)
Dept: ENDOCRINOLOGY | Facility: CLINIC | Age: 53
End: 2023-05-30

## 2023-05-30 DIAGNOSIS — E29.1 HYPOGONADISM IN MALE: Primary | ICD-10-CM

## 2023-05-30 DIAGNOSIS — E22.1 HYPERPROLACTINEMIA: ICD-10-CM

## 2023-05-30 DIAGNOSIS — E66.01 CLASS 2 SEVERE OBESITY DUE TO EXCESS CALORIES WITH SERIOUS COMORBIDITY AND BODY MASS INDEX (BMI) OF 39.0 TO 39.9 IN ADULT: ICD-10-CM

## 2023-05-30 DIAGNOSIS — E55.9 VITAMIN D DEFICIENCY: ICD-10-CM

## 2023-05-30 NOTE — TELEPHONE ENCOUNTER
[facility] CALLED TO REQUEST LAB ORDERS FOR UPCOMING APPT. PATIENT WOULD LIKE TO HAVE LABS PLACED TODAY.

## 2023-05-31 ENCOUNTER — LAB (OUTPATIENT)
Dept: LAB | Facility: HOSPITAL | Age: 53
End: 2023-05-31

## 2023-05-31 DIAGNOSIS — E66.01 CLASS 2 SEVERE OBESITY DUE TO EXCESS CALORIES WITH SERIOUS COMORBIDITY AND BODY MASS INDEX (BMI) OF 39.0 TO 39.9 IN ADULT: ICD-10-CM

## 2023-05-31 DIAGNOSIS — E29.1 HYPOGONADISM IN MALE: ICD-10-CM

## 2023-05-31 DIAGNOSIS — E22.1 HYPERPROLACTINEMIA: ICD-10-CM

## 2023-05-31 DIAGNOSIS — E55.9 VITAMIN D DEFICIENCY: ICD-10-CM

## 2023-05-31 LAB
25(OH)D3 SERPL-MCNC: 27.8 NG/ML (ref 30–100)
ALBUMIN SERPL-MCNC: 4.7 G/DL (ref 3.5–5.2)
ALBUMIN/GLOB SERPL: 2 G/DL
ALP SERPL-CCNC: 70 U/L (ref 39–117)
ALT SERPL W P-5'-P-CCNC: 53 U/L (ref 1–41)
ANION GAP SERPL CALCULATED.3IONS-SCNC: 12.6 MMOL/L (ref 5–15)
AST SERPL-CCNC: 39 U/L (ref 1–40)
BILIRUB SERPL-MCNC: 0.6 MG/DL (ref 0–1.2)
BUN SERPL-MCNC: 15 MG/DL (ref 6–20)
BUN/CREAT SERPL: 13.2 (ref 7–25)
CALCIUM SPEC-SCNC: 9.5 MG/DL (ref 8.6–10.5)
CHLORIDE SERPL-SCNC: 104 MMOL/L (ref 98–107)
CHOLEST SERPL-MCNC: 211 MG/DL (ref 0–200)
CO2 SERPL-SCNC: 22.4 MMOL/L (ref 22–29)
CREAT SERPL-MCNC: 1.14 MG/DL (ref 0.76–1.27)
DEPRECATED RDW RBC AUTO: 39 FL (ref 37–54)
EGFRCR SERPLBLD CKD-EPI 2021: 77.4 ML/MIN/1.73
ERYTHROCYTE [DISTWIDTH] IN BLOOD BY AUTOMATED COUNT: 12.3 % (ref 12.3–15.4)
GLOBULIN UR ELPH-MCNC: 2.4 GM/DL
GLUCOSE SERPL-MCNC: 103 MG/DL (ref 65–99)
HBA1C MFR BLD: 5.7 % (ref 4.8–5.6)
HCT VFR BLD AUTO: 50.9 % (ref 37.5–51)
HDLC SERPL-MCNC: 31 MG/DL (ref 40–60)
HGB BLD-MCNC: 17.7 G/DL (ref 13–17.7)
LDLC SERPL CALC-MCNC: 158 MG/DL (ref 0–100)
LDLC/HDLC SERPL: 5.03 {RATIO}
MCH RBC QN AUTO: 30.5 PG (ref 26.6–33)
MCHC RBC AUTO-ENTMCNC: 34.8 G/DL (ref 31.5–35.7)
MCV RBC AUTO: 87.6 FL (ref 79–97)
PLATELET # BLD AUTO: 213 10*3/MM3 (ref 140–450)
PMV BLD AUTO: 11.4 FL (ref 6–12)
POTASSIUM SERPL-SCNC: 4.7 MMOL/L (ref 3.5–5.2)
PROLACTIN SERPL-MCNC: 9.08 NG/ML (ref 4.04–15.2)
PROT SERPL-MCNC: 7.1 G/DL (ref 6–8.5)
PSA SERPL-MCNC: 0.41 NG/ML (ref 0–4)
PTH-INTACT SERPL-MCNC: 52.9 PG/ML (ref 15–65)
RBC # BLD AUTO: 5.81 10*6/MM3 (ref 4.14–5.8)
SODIUM SERPL-SCNC: 139 MMOL/L (ref 136–145)
TESTOST SERPL-MCNC: 382 NG/DL (ref 193–740)
TRIGL SERPL-MCNC: 121 MG/DL (ref 0–150)
VLDLC SERPL-MCNC: 22 MG/DL (ref 5–40)
WBC NRBC COR # BLD: 6.99 10*3/MM3 (ref 3.4–10.8)

## 2023-05-31 PROCEDURE — 83036 HEMOGLOBIN GLYCOSYLATED A1C: CPT

## 2023-05-31 PROCEDURE — 85027 COMPLETE CBC AUTOMATED: CPT

## 2023-05-31 PROCEDURE — 84153 ASSAY OF PSA TOTAL: CPT

## 2023-05-31 PROCEDURE — 80061 LIPID PANEL: CPT

## 2023-05-31 PROCEDURE — 84403 ASSAY OF TOTAL TESTOSTERONE: CPT

## 2023-05-31 PROCEDURE — 36415 COLL VENOUS BLD VENIPUNCTURE: CPT

## 2023-05-31 PROCEDURE — 83970 ASSAY OF PARATHORMONE: CPT

## 2023-05-31 PROCEDURE — 80053 COMPREHEN METABOLIC PANEL: CPT

## 2023-05-31 PROCEDURE — 84146 ASSAY OF PROLACTIN: CPT

## 2023-05-31 PROCEDURE — 82306 VITAMIN D 25 HYDROXY: CPT

## 2023-06-05 ENCOUNTER — OFFICE VISIT (OUTPATIENT)
Dept: ENDOCRINOLOGY | Facility: CLINIC | Age: 53
End: 2023-06-05
Payer: COMMERCIAL

## 2023-06-05 VITALS
BODY MASS INDEX: 37.25 KG/M2 | WEIGHT: 275 LBS | HEIGHT: 72 IN | HEART RATE: 71 BPM | OXYGEN SATURATION: 97 % | DIASTOLIC BLOOD PRESSURE: 74 MMHG | SYSTOLIC BLOOD PRESSURE: 122 MMHG

## 2023-06-05 DIAGNOSIS — E22.1 HYPERPROLACTINEMIA: Primary | ICD-10-CM

## 2023-06-05 DIAGNOSIS — R73.03 PREDIABETES: ICD-10-CM

## 2023-06-05 DIAGNOSIS — D35.2 PITUITARY MICROADENOMA: ICD-10-CM

## 2023-06-05 DIAGNOSIS — E29.1 HYPOGONADISM IN MALE: ICD-10-CM

## 2023-06-05 DIAGNOSIS — E55.9 VITAMIN D DEFICIENCY: ICD-10-CM

## 2023-06-05 DIAGNOSIS — E78.2 MIXED HYPERLIPIDEMIA: ICD-10-CM

## 2023-06-05 LAB
EXPIRATION DATE: NORMAL
GLUCOSE BLDC GLUCOMTR-MCNC: 121 MG/DL (ref 70–130)
Lab: NORMAL

## 2023-06-05 PROCEDURE — 82947 ASSAY GLUCOSE BLOOD QUANT: CPT | Performed by: INTERNAL MEDICINE

## 2023-06-05 PROCEDURE — 99214 OFFICE O/P EST MOD 30 MIN: CPT | Performed by: INTERNAL MEDICINE

## 2023-06-05 RX ORDER — CABERGOLINE 0.5 MG/1
0.25 TABLET ORAL
Qty: 8 TABLET | Refills: 3 | Status: SHIPPED | OUTPATIENT
Start: 2023-06-05 | End: 2024-06-04

## 2023-06-05 RX ORDER — TESTOSTERONE CYPIONATE 200 MG/ML
0.8 VIAL (ML) INTRAMUSCULAR
Qty: 13 ML | Refills: 1 | Status: SHIPPED | OUTPATIENT
Start: 2023-06-05

## 2023-06-05 NOTE — PROGRESS NOTES
"Chief Complaint   Patient presents with    Pituitary Problem    Hypogonadism    Blood Sugar Problem        HPI   Harish Anaya is a 53 y.o. male had concerns including Pituitary Problem, Hypogonadism, and Blood Sugar Problem.      A1c is up to 5.7.   Diet hasn't been as clean. Weight is up 7 lbs.   Few weeks ago developed a URI and cough so hasn't been exercising as much.   In recent months increased strength training and cut back on cardio.   Testosterone level is good.     Hasn't been taking his vitamin D supplement recently.     The following portions of the patient's history were reviewed and updated as appropriate: allergies, current medications, past family history, past medical history, past social history, past surgical history, and problem list.    Review of Systems   Constitutional:  Positive for activity change.      /74   Pulse 71   Ht 182.9 cm (72\")   Wt 125 kg (275 lb)   SpO2 97%   BMI 37.30 kg/m²      Physical Exam  Vitals reviewed.   Constitutional:       Appearance: Normal appearance. He is obese.      Comments: Body mass index is 37.3 kg/m².   Cardiovascular:      Rate and Rhythm: Normal rate.   Pulmonary:      Effort: Pulmonary effort is normal.   Neurological:      General: No focal deficit present.      Mental Status: He is alert. Mental status is at baseline.   Psychiatric:         Mood and Affect: Mood normal.         Behavior: Behavior normal.            LABS AND IMAGING   CMP  Lab Results   Component Value Date    GLUCOSE 103 (H) 05/31/2023    BUN 15 05/31/2023    CREATININE 1.14 05/31/2023    EGFRIFNONA 77 02/05/2022    EGFRIFAFRI 88 03/16/2017    BCR 13.2 05/31/2023    K 4.7 05/31/2023    CO2 22.4 05/31/2023    CALCIUM 9.5 05/31/2023    PROTENTOTREF 7.4 03/16/2017    ALBUMIN 4.7 05/31/2023    LABIL2 1.7 03/16/2017    AST 39 05/31/2023    ALT 53 (H) 05/31/2023        CBC w/DIFF   Lab Results   Component Value Date    WBC 6.99 05/31/2023    RBC 5.81 (H) 05/31/2023    HGB " 17.7 05/31/2023    HCT 50.9 05/31/2023    MCV 87.6 05/31/2023    MCH 30.5 05/31/2023    MCHC 34.8 05/31/2023    RDW 12.3 05/31/2023    RDWSD 39.0 05/31/2023    MPV 11.4 05/31/2023     05/31/2023    NEUTRORELPCT 66.9 01/18/2021    LYMPHORELPCT 22.7 01/18/2021    MONORELPCT 8.5 01/18/2021    EOSRELPCT 1.2 01/18/2021    BASORELPCT 0.4 01/18/2021    AUTOIGPER 0.3 01/18/2021    NEUTROABS 4.88 01/18/2021    LYMPHSABS 1.66 01/18/2021    MONOSABS 0.62 01/18/2021    EOSABS 0.09 01/18/2021    BASOSABS 0.03 01/18/2021    AUTOIGNUM 0.02 01/18/2021    NRBC 0.0 01/18/2021     TSH  Lab Results   Component Value Date    TSH 1.410 02/05/2022    TSH 1.070 09/25/2021    TSH 1.340 01/18/2021     T4  Lab Results   Component Value Date    FREET4 0.99 02/05/2022    FREET4 1.31 01/18/2021    FREET4 0.96 03/04/2020     Hemoglobin A1C   Date Value Ref Range Status   05/31/2023 5.70 (H) 4.80 - 5.60 % Final   06/01/2022 5.3 % Final   01/31/2022 6.1 % Final   09/25/2021 6.10 (H) 4.80 - 5.60 % Final     Lab Results   Component Value Date    PROLACTIN 9.08 05/31/2023    PROLACTIN 6.23 01/13/2023    PROLACTIN 9.47 05/28/2022    PROLACTIN 22.30 (H) 02/05/2022    PROLACTIN 8.14 09/25/2021    PROLACTIN 4.64 05/21/2021    PROLACTIN 9.82 01/18/2021    PROLACTIN 0.78 (L) 09/05/2020    PROLACTIN 0.70 (L) 05/30/2020    PROLACTIN 126.00 (H) 03/04/2020    PROLACTIN 115.00 (H) 02/22/2020    PROLACTIN 34.9 (H) 01/23/2020    TESTOSTEROTT 165 (L) 09/25/2021    TESTOSTERONE 382.00 05/31/2023    PSA 0.411 05/31/2023     Lab Results   Component Value Date    GNCN88PM 27.8 (L) 05/31/2023    PTH 52.9 05/31/2023     Lipid Panel:  Lab Results   Component Value Date    CHOL 211 (H) 05/31/2023    TRIG 121 05/31/2023    HDL 31 (L) 05/31/2023    HDL 23 (L) 05/28/2022    HDL 27 (L) 02/05/2022    VLDL 22 05/31/2023     (H) 05/31/2023     (H) 05/28/2022     (H) 02/05/2022     PROCEDURE: MRI PITUITARY W WO CONTRAST-     HISTORY: Pituitary adenoma,  known or suspected; D35.2-Benign neoplasm of  pituitary gland     PROCEDURE: Multiplanar multisequence imaging of the brain was performed  both before and following the administration of 15 mL MultiHance  intravenous contrast.     COMPARISON: 8/27/2020 and 2/27/2020.     FINDINGS: There are no significant white matter abnormalities. There is  no mass, mass effect or midline shift. There is no hydrocephalus. There  are no areas of restricted diffusion. There is no pathologic contrast  enhancement.     The midbrain, yasmin, cerebellum and craniocervical junction are  unremarkable. Dynamic imaging through the pituitary gland demonstrates  interval decrease in size of the lesion in the left aspects of the  pituitary gland which measures approximately 2 mm on today's exam. The  major intracranial vasculature demonstrates the expected flow related  signal. The paranasal sinuses are clear.     IMPRESSION:  Decrease in size of the patient's pituitary adenoma which  measures 2 mm on today's exam.        This report was finalized on 8/10/2021 3:33 PM by April Nagy M.D.      Assessment and Plan    Diagnoses and all orders for this visit:    1. Hyperprolactinemia (Primary)  Diagnosed 2020. On cabergoline 0.25 mg weekly. Level stable. Monitor q 6 months.  -     cabergoline (DOSTINEX) 0.5 MG tablet; Take 0.5 tablets by mouth Every 7 (Seven) Days.  Dispense: 8 tablet; Refill: 3  -     Prolactin; Future    2. Pituitary microadenoma  MRI from 8/10/2021 showed decrease in size of the pituitary adenoma, down to 2 mm.  Prolactin levels responding appropriately. Can d/c monitoring.       3. Prediabetes  A1c up to 5.7. Increase exercise. Improve diet. Will monitor.   -     POC Glucose, Blood  -     Hemoglobin A1c; Future    4. Hypogonadism in male  Level in range. PSA normal. Refill sent. Monitor yearly.  -     Testosterone Cypionate 200 MG/ML solution; Inject 0.8 mL as directed Every 14 (Fourteen) Days.  Dispense: 13 mL; Refill:  1  -     Testosterone; Future  -     Comprehensive Metabolic Panel; Future    5. Mixed hyperlipidemia  High LDL, low HDL. Monitor yearly. Working on diet and exercise.    6. Vitamin D deficiency  Encouraged to resume vitamin D 1000 IU daily.   -     Vitamin D,25-Hydroxy; Future         Return in about 6 months (around 12/5/2023) for next scheduled follow up. The patient was instructed to contact the clinic with any interval questions or concerns.    Elma Thomas, DO   Endocrinologist    Please note that portions of this note were completed with a voice recognition program.

## 2023-06-14 ENCOUNTER — OFFICE VISIT (OUTPATIENT)
Dept: INTERNAL MEDICINE | Facility: CLINIC | Age: 53
End: 2023-06-14
Payer: COMMERCIAL

## 2023-06-14 VITALS
WEIGHT: 273 LBS | BODY MASS INDEX: 36.98 KG/M2 | TEMPERATURE: 97.5 F | HEART RATE: 69 BPM | SYSTOLIC BLOOD PRESSURE: 144 MMHG | HEIGHT: 72 IN | DIASTOLIC BLOOD PRESSURE: 82 MMHG | OXYGEN SATURATION: 96 %

## 2023-06-14 DIAGNOSIS — E66.01 CLASS 2 SEVERE OBESITY DUE TO EXCESS CALORIES WITH SERIOUS COMORBIDITY AND BODY MASS INDEX (BMI) OF 39.0 TO 39.9 IN ADULT: ICD-10-CM

## 2023-06-14 DIAGNOSIS — E29.1 HYPOGONADISM IN MALE: ICD-10-CM

## 2023-06-14 DIAGNOSIS — G47.09 OTHER INSOMNIA: ICD-10-CM

## 2023-06-14 DIAGNOSIS — Z00.00 ENCOUNTER FOR PREVENTIVE HEALTH EXAMINATION: Primary | ICD-10-CM

## 2023-06-14 DIAGNOSIS — E78.49 OTHER HYPERLIPIDEMIA: ICD-10-CM

## 2023-06-14 DIAGNOSIS — D35.2 PROLACTINOMA: ICD-10-CM

## 2023-06-14 NOTE — PROGRESS NOTES
Chief Complaint   Patient presents with    Annual Exam     Patient states having numbness on right foot and  both hands and sorerness on chest close to collar bone for about two weeks        Subjective     History of Present Illness   Harish Anaya is a 53 y.o. male presenting for annual physical.  Preventive health maintenance was reviewed and discussed today. Vaccines were updated.  Patient shares that he has been feeling overall fairly well.  He continues to follow with endocrinology and has had labs done recently.  These were reviewed and discussed today.  Cholesterol levels were mildly elevated.  He recalls having good cholesterol levels a few years ago when he had followed a vegan diet.  Unfortunately, he has increased his beef intake slightly.    Patient does remain physically active as he goes to the gym about 5 times a week.  He tried shifting to more strength based exercises but realized he was better with doing more cardio based exercises.    The following portions of the patient's history were reviewed and updated as appropriate: allergies, current medications, past family history, past medical history, past social history, past surgical history and problem list.    Review of Systems   Constitutional:  Negative for chills, fatigue and fever.   HENT:  Negative for congestion, ear pain, rhinorrhea, sinus pressure and sore throat.    Eyes:  Negative for visual disturbance.   Respiratory:  Negative for cough, chest tightness, shortness of breath and wheezing.    Cardiovascular:  Negative for chest pain, palpitations and leg swelling.   Gastrointestinal:  Negative for abdominal pain, blood in stool, constipation, diarrhea, nausea and vomiting.   Endocrine: Negative for polydipsia and polyuria.   Genitourinary:  Negative for dysuria and hematuria.   Musculoskeletal:  Negative for arthralgias and back pain.   Skin:  Negative for rash.   Neurological:  Negative for dizziness, light-headedness, numbness  and headaches.   Psychiatric/Behavioral:  Negative for dysphoric mood and sleep disturbance. The patient is not nervous/anxious.      No Known Allergies    Past Medical History:   Diagnosis Date    Allergic rhinitis     BMI 38.0-38.9,adult     Diabetes mellitus     borderline    Diverticulosis of colon without hemorrhage     Dizziness     Heartburn     Hyperlipidemia     Hypopituitarism     Malaise and fatigue     Pituitary adenoma     Polyuria     PONV (postoperative nausea and vomiting)     Sore throat     Testosterone deficiency     Vegetarian diet     since 3 wk ago    Vitamin D deficiency 10/15/21       Social History     Socioeconomic History    Marital status:    Tobacco Use    Smoking status: Former     Years: 2.00     Types: Cigarettes     Quit date: 1990     Years since quittin.4    Smokeless tobacco: Never    Tobacco comments:     Was never a heavy smoker   Vaping Use    Vaping Use: Never used   Substance and Sexual Activity    Alcohol use: No    Drug use: No    Sexual activity: Defer        Past Surgical History:   Procedure Laterality Date    APPENDECTOMY      CHOLECYSTECTOMY      COLONOSCOPY      complete     COLONOSCOPY N/A 2020    Procedure: COLONOSCOPY;  Surgeon: Ruddy Lara MD;  Location: [facility];  Service: Gastroenterology    ELBOW PROCEDURE         Family History   Problem Relation Age of Onset    Depression Mother     Cancer Maternal Grandmother     Melanoma Maternal Grandmother     Parkinsonism Maternal Grandfather     Diabetes Maternal Aunt     Alcohol abuse Maternal Uncle     Liver disease Maternal Uncle     Colon cancer Neg Hx     Cirrhosis Neg Hx     Liver cancer Neg Hx          Current Outpatient Medications:     amitriptyline (ELAVIL) 10 MG tablet, Take 1 tablet by mouth At Night As Needed for Sleep., Disp: 90 tablet, Rfl: 3    Ascorbic Acid (VITAMIN C GUMMIE PO), Take  by mouth Daily., Disp: , Rfl:     cabergoline (DOSTINEX) 0.5 MG tablet, Take 0.5  "tablets by mouth Every 7 (Seven) Days., Disp: 8 tablet, Rfl: 3    cholecalciferol (VITAMIN D3) 25 MCG (1000 UT) tablet, Take 1 tablet by mouth Daily., Disp: , Rfl:     Multiple Vitamins-Minerals (MULTIVITAMIN WITH MINERALS) tablet tablet, Take 1 tablet by mouth 2 (Two) Times a Day., Disp: , Rfl:     Needle, Disp, (Hypodermic Needle) 22G X 1\" misc, Use for testosterone injection, Disp: 6 each, Rfl: 3    Needle, Disp, (MONOJECT HYPO 18GX1\") 18G X 1\" misc, Use for testosterone injection, Disp: 6 each, Rfl: 3    Probiotic capsule, Take 1 capsule by mouth Daily., Disp: 30 capsule, Rfl: 1    Testosterone Cypionate 200 MG/ML solution, Inject 0.8 mL as directed Every 14 (Fourteen) Days., Disp: 13 mL, Rfl: 1    Objective   /82   Pulse 69   Temp 97.5 °F (36.4 °C)   Ht 182.9 cm (72\")   Wt 124 kg (273 lb)   SpO2 96%   BMI 37.03 kg/m²     Physical Exam  Vitals and nursing note reviewed.   Constitutional:       Appearance: Normal appearance. He is well-developed.   HENT:      Head: Normocephalic and atraumatic.      Right Ear: Tympanic membrane and external ear normal.      Left Ear: Tympanic membrane and external ear normal.      Nose: Nose normal. No congestion.      Mouth/Throat:      Mouth: Mucous membranes are moist.      Pharynx: No oropharyngeal exudate.   Eyes:      General: No scleral icterus.        Right eye: No discharge.      Pupils: Pupils are equal, round, and reactive to light.   Neck:      Thyroid: No thyromegaly.      Vascular: No JVD.   Cardiovascular:      Rate and Rhythm: Normal rate and regular rhythm.      Heart sounds: Normal heart sounds. No murmur heard.    No friction rub.   Pulmonary:      Effort: Pulmonary effort is normal. No respiratory distress.      Breath sounds: Normal breath sounds. No stridor. No wheezing.   Abdominal:      General: Bowel sounds are normal. There is no distension.      Palpations: Abdomen is soft.      Tenderness: There is no abdominal tenderness. There is no " guarding.   Genitourinary:     Comments: Deferred  Musculoskeletal:         General: No tenderness. Normal range of motion.      Cervical back: Normal range of motion and neck supple.   Lymphadenopathy:      Cervical: No cervical adenopathy.   Skin:     General: Skin is warm and dry.      Findings: No rash.   Neurological:      Mental Status: He is alert and oriented to person, place, and time.      Cranial Nerves: No cranial nerve deficit.   Psychiatric:         Mood and Affect: Mood normal.         Behavior: Behavior normal.         Thought Content: Thought content normal.       Assessment & Plan   Diagnoses and all orders for this visit:    1. Encounter for preventive health examination (Primary)    2. Other hyperlipidemia  -     Lipid Panel    3. Prolactinoma    4. Hypogonadism in male    5. Other insomnia    6. Class 2 severe obesity due to excess calories with serious comorbidity and body mass index (BMI) of 39.0 to 39.9 in adult          Discussion Summary:  Patient is a 53 y.o. male presenting for annual physical    1. Preventive Health Maintenance  - Baseline labs are up-to-date or ordered per above.  - Vaccines reviewed and updated  - Preventive health measures were discussed including: healthy diet with increase in fruits and vegetables, regular exercise at least 3 times a week, safe sex practices, avoidance of drugs, tobacco, and alcohol, and regular seatbelt use.    2.  Obesity  - Weight loss options were discussed in detail including reducing fried, fatty, and sugary foods with diet control. A regular exercise regimen was discussed.  Class 2 Severe Obesity (BMI >=35 and <=39.9). Obesity-related health conditions include the following: hypertension, coronary heart disease, and diabetes mellitus. Obesity is worsening. BMI is is above average; BMI management plan is completed. We discussed portion control and increasing exercise.    3.  Prolactinoma/hypogonadism  - Stable on current therapies per  endocrinology.  Labs were reviewed and discussed today.    4.  Hyperlipidemia  - Cholesterol levels remain on the higher side.  Patient advised to monitor diet more carefully.  She prefers not to be on statin medications.  The general benefits of statins were discussed and reviewed today.    5.  Insomnia  - Well-controlled with amitriptyline low-dose as needed.      Follow up:  Return in about 1 year (around 6/14/2024) for Annual physical.     Patient Instructions:  Patient instructions were provided.

## 2023-09-26 DIAGNOSIS — K57.92 ACUTE DIVERTICULITIS: Primary | ICD-10-CM

## 2023-09-26 DIAGNOSIS — R10.32 LEFT LOWER QUADRANT ABDOMINAL PAIN: ICD-10-CM

## 2023-09-26 RX ORDER — CIPROFLOXACIN 500 MG/1
500 TABLET, FILM COATED ORAL 2 TIMES DAILY
Qty: 20 TABLET | Refills: 0 | Status: SHIPPED | OUTPATIENT
Start: 2023-09-26

## 2023-09-26 RX ORDER — METRONIDAZOLE 500 MG/1
500 TABLET ORAL 3 TIMES DAILY
Qty: 30 TABLET | Refills: 0 | Status: SHIPPED | OUTPATIENT
Start: 2023-09-26

## 2023-09-26 RX ORDER — DICYCLOMINE HCL 20 MG
20 TABLET ORAL 3 TIMES DAILY PRN
Qty: 30 TABLET | Refills: 0 | Status: SHIPPED | OUTPATIENT
Start: 2023-09-26

## 2023-11-28 ENCOUNTER — LAB (OUTPATIENT)
Dept: LAB | Facility: HOSPITAL | Age: 53
End: 2023-11-28
Payer: COMMERCIAL

## 2023-11-28 DIAGNOSIS — E22.1 HYPERPROLACTINEMIA: ICD-10-CM

## 2023-11-28 DIAGNOSIS — E29.1 HYPOGONADISM IN MALE: ICD-10-CM

## 2023-11-28 DIAGNOSIS — R73.03 PREDIABETES: ICD-10-CM

## 2023-11-28 DIAGNOSIS — E55.9 VITAMIN D DEFICIENCY: ICD-10-CM

## 2023-11-28 LAB
25(OH)D3 SERPL-MCNC: 42.4 NG/ML (ref 30–100)
ALBUMIN SERPL-MCNC: 4.6 G/DL (ref 3.5–5.2)
ALBUMIN/GLOB SERPL: 2.1 G/DL
ALP SERPL-CCNC: 55 U/L (ref 39–117)
ALT SERPL W P-5'-P-CCNC: 55 U/L (ref 1–41)
ANION GAP SERPL CALCULATED.3IONS-SCNC: 8.3 MMOL/L (ref 5–15)
AST SERPL-CCNC: 45 U/L (ref 1–40)
BILIRUB SERPL-MCNC: 0.7 MG/DL (ref 0–1.2)
BUN SERPL-MCNC: 12 MG/DL (ref 6–20)
BUN/CREAT SERPL: 9.8 (ref 7–25)
CALCIUM SPEC-SCNC: 9.1 MG/DL (ref 8.6–10.5)
CHLORIDE SERPL-SCNC: 106 MMOL/L (ref 98–107)
CO2 SERPL-SCNC: 25.7 MMOL/L (ref 22–29)
CREAT SERPL-MCNC: 1.23 MG/DL (ref 0.76–1.27)
EGFRCR SERPLBLD CKD-EPI 2021: 70.6 ML/MIN/1.73
GLOBULIN UR ELPH-MCNC: 2.2 GM/DL
GLUCOSE SERPL-MCNC: 97 MG/DL (ref 65–99)
HBA1C MFR BLD: 5.9 % (ref 4.8–5.6)
POTASSIUM SERPL-SCNC: 4.7 MMOL/L (ref 3.5–5.2)
PROLACTIN SERPL-MCNC: 5.86 NG/ML (ref 4.04–15.2)
PROT SERPL-MCNC: 6.8 G/DL (ref 6–8.5)
SODIUM SERPL-SCNC: 140 MMOL/L (ref 136–145)
TESTOST SERPL-MCNC: 505 NG/DL (ref 193–740)

## 2023-11-28 PROCEDURE — 84403 ASSAY OF TOTAL TESTOSTERONE: CPT

## 2023-11-28 PROCEDURE — 80053 COMPREHEN METABOLIC PANEL: CPT

## 2023-11-28 PROCEDURE — 83036 HEMOGLOBIN GLYCOSYLATED A1C: CPT

## 2023-11-28 PROCEDURE — 82306 VITAMIN D 25 HYDROXY: CPT

## 2023-11-28 PROCEDURE — 84146 ASSAY OF PROLACTIN: CPT

## 2023-12-06 ENCOUNTER — OFFICE VISIT (OUTPATIENT)
Dept: ENDOCRINOLOGY | Facility: CLINIC | Age: 53
End: 2023-12-06
Payer: COMMERCIAL

## 2023-12-06 VITALS
DIASTOLIC BLOOD PRESSURE: 78 MMHG | BODY MASS INDEX: 39.55 KG/M2 | HEART RATE: 76 BPM | SYSTOLIC BLOOD PRESSURE: 132 MMHG | WEIGHT: 292 LBS | HEIGHT: 72 IN | OXYGEN SATURATION: 99 %

## 2023-12-06 DIAGNOSIS — E55.9 VITAMIN D DEFICIENCY: ICD-10-CM

## 2023-12-06 DIAGNOSIS — R73.03 PREDIABETES: ICD-10-CM

## 2023-12-06 DIAGNOSIS — D35.2 PITUITARY MICROADENOMA: ICD-10-CM

## 2023-12-06 DIAGNOSIS — E29.1 HYPOGONADISM IN MALE: ICD-10-CM

## 2023-12-06 DIAGNOSIS — E22.1 HYPERPROLACTINEMIA: Primary | ICD-10-CM

## 2023-12-06 DIAGNOSIS — E78.2 MIXED HYPERLIPIDEMIA: ICD-10-CM

## 2023-12-06 DIAGNOSIS — E66.01 CLASS 2 SEVERE OBESITY DUE TO EXCESS CALORIES WITH SERIOUS COMORBIDITY AND BODY MASS INDEX (BMI) OF 39.0 TO 39.9 IN ADULT: ICD-10-CM

## 2023-12-06 RX ORDER — TESTOSTERONE CYPIONATE 200 MG/ML
INJECTION, SOLUTION INTRAMUSCULAR
Qty: 13 ML | Refills: 1 | Status: SHIPPED | OUTPATIENT
Start: 2023-12-06

## 2023-12-06 NOTE — PROGRESS NOTES
"Chief Complaint   Patient presents with    Abnormal Lab     Hyperprolactinemia      Hypogonadism    Prediabetes          HPI   Harish Anaya is a 53 y.o. male had concerns including Abnormal Lab (Hyperprolactinemia/), Hypogonadism, and Prediabetes.      A1c up a bit to 5.9.   Weight is up 20 lbs. Appetite is increased. Thinks is driven by weight lifting.   Getting enough protein. Not eating as much salads.     The following portions of the patient's history were reviewed and updated as appropriate: allergies, current medications, past family history, past medical history, past social history, past surgical history, and problem list.      Review of Systems   Constitutional:  Positive for appetite change and unexpected weight gain.        Physical Exam  Vitals reviewed.   Constitutional:       Appearance: Normal appearance. He is obese.      Comments: Body mass index is 39.6 kg/m².   Cardiovascular:      Rate and Rhythm: Normal rate.   Pulmonary:      Effort: Pulmonary effort is normal.   Neurological:      General: No focal deficit present.      Mental Status: He is alert. Mental status is at baseline.   Psychiatric:         Mood and Affect: Mood normal.         Behavior: Behavior normal.        /78 (BP Location: Left arm, Patient Position: Sitting, Cuff Size: Adult)   Pulse 76   Ht 182.9 cm (72\")   Wt 132 kg (292 lb)   SpO2 99%   BMI 39.60 kg/m²      Labs and imaging    CMP:  Lab Results   Component Value Date    BUN 12 11/28/2023    CREATININE 1.23 11/28/2023    EGFR 70.6 11/28/2023    BCR 9.8 11/28/2023     11/28/2023    K 4.7 11/28/2023    CO2 25.7 11/28/2023    CALCIUM 9.1 11/28/2023    ALBUMIN 4.6 11/28/2023    BILITOT 0.7 11/28/2023    ALKPHOS 55 11/28/2023    AST 45 (H) 11/28/2023    ALT 55 (H) 11/28/2023     Lipid Panel:  Lab Results   Component Value Date    CHOL 211 (H) 05/31/2023    TRIG 121 05/31/2023    HDL 31 (L) 05/31/2023    VLDL 22 05/31/2023     (H) 05/31/2023 "     HbA1c:  Lab Results   Component Value Date    HGBA1C 5.90 (H) 11/28/2023    HGBA1C 5.70 (H) 05/31/2023     Glucose:  Lab Results   Component Value Date    POCGLU 121 06/05/2023     TSH:  Lab Results   Component Value Date    TSH 1.410 02/05/2022     Lab Results   Component Value Date    PSA 0.411 05/31/2023    TESTOSTEROTT 165 (L) 09/25/2021    TESTOSTERONE 505.00 11/28/2023    PROLACTIN 5.86 11/28/2023    PROLACTIN 9.08 05/31/2023    PROLACTIN 6.23 01/13/2023    PROLACTIN 9.47 05/28/2022    PROLACTIN 22.30 (H) 02/05/2022    PROLACTIN 8.14 09/25/2021    PROLACTIN 4.64 05/21/2021    PROLACTIN 9.82 01/18/2021    PROLACTIN 0.78 (L) 09/05/2020    PROLACTIN 0.70 (L) 05/30/2020    PROLACTIN 126.00 (H) 03/04/2020    PROLACTIN 115.00 (H) 02/22/2020    PROLACTIN 34.9 (H) 01/23/2020     PROCEDURE: MRI PITUITARY W WO CONTRAST-     HISTORY: Pituitary adenoma, known or suspected; D35.2-Benign neoplasm of  pituitary gland     PROCEDURE: Multiplanar multisequence imaging of the brain was performed  both before and following the administration of 15 mL MultiHance  intravenous contrast.     COMPARISON: 8/27/2020 and 2/27/2020.     FINDINGS: There are no significant white matter abnormalities. There is  no mass, mass effect or midline shift. There is no hydrocephalus. There  are no areas of restricted diffusion. There is no pathologic contrast  enhancement.     The midbrain, yasmin, cerebellum and craniocervical junction are  unremarkable. Dynamic imaging through the pituitary gland demonstrates  interval decrease in size of the lesion in the left aspects of the  pituitary gland which measures approximately 2 mm on today's exam. The  major intracranial vasculature demonstrates the expected flow related  signal. The paranasal sinuses are clear.     IMPRESSION:  Decrease in size of the patient's pituitary adenoma which  measures 2 mm on today's exam.        This report was finalized on 8/10/2021 3:33 PM by April Nagy M.D.        Assessment and plan  Diagnoses and all orders for this visit:    1. Hyperprolactinemia (Primary)  Diagnosed 2020. On cabergoline 0.25 mg weekly. Level stable. Monitor q 6 months.    2. Pituitary microadenoma  MRI from 8/10/2021 showed decrease in size of the pituitary adenoma, down to 2 mm.  Prolactin levels responding appropriately. Can d/c routine imaging if prolactin levels stable.       3. Prediabetes  A1c 5.9. Up due to weight gain. Continue exercise.   Dietary control as able.     4. Hypogonadism in male  Last testosterone level in an optimal range. Reviewed XIOMARA. Refill sent.   -     Testosterone Cypionate 200 MG/ML solution; Inject 0.8 mL as directed Every 14 (Fourteen) Days.  Dispense: 13 mL; Refill: 1    5. Mixed hyperlipidemia  Diet/lifestyle changes for management. Repeat prior to follow-up.     6. Class 2 severe obesity due to excess calories with serious comorbidity and body mass index (BMI) of 39.0 to 39.9 in adult  BMI up to 39.6 with increase in intake. Increase protein and fiber. Continue exercise.       Return in about 6 months (around 6/6/2024) for next scheduled follow up. The patient was instructed to contact the clinic with any interval questions or concerns.    Elma Thomas, DO   Endocrinologist    Please note that portions of this note were completed with a voice recognition program.

## 2023-12-28 DIAGNOSIS — E22.1 HYPERPROLACTINEMIA: ICD-10-CM

## 2023-12-28 NOTE — TELEPHONE ENCOUNTER
Needing authorization for (re-approved through new insurance):  Testosterone Cypionate (DEPOTESTOTERONE CYPIONATE) 200 MG/ML injection     And RX Refill for:  cabergoline (DOSTINEX) 0.5 MG tablet     To send to:  [facility]

## 2023-12-29 ENCOUNTER — TELEPHONE (OUTPATIENT)
Dept: ENDOCRINOLOGY | Facility: CLINIC | Age: 53
End: 2023-12-29
Payer: COMMERCIAL

## 2023-12-29 RX ORDER — CABERGOLINE 0.5 MG/1
0.25 TABLET ORAL
Qty: 8 TABLET | Refills: 0 | Status: SHIPPED | OUTPATIENT
Start: 2023-12-29 | End: 2024-04-19

## 2023-12-29 NOTE — TELEPHONE ENCOUNTER
PATIENT'S WIFE CALLED REGARDING HIS CABERGOLINE RX. SHE STATES IT HAS STILL NOT BEEN SENT IN. HE ONLY HAS ONE DOSE REMAINING.

## 2023-12-29 NOTE — TELEPHONE ENCOUNTER
Left message to return call.  Need pharmacy to send over denial.        For refill of cabergoline.  Last office visit 12/06/2023.  Follow up scheduled for 06/17/2024.

## 2024-01-02 ENCOUNTER — PRIOR AUTHORIZATION (OUTPATIENT)
Dept: ENDOCRINOLOGY | Facility: CLINIC | Age: 54
End: 2024-01-02
Payer: COMMERCIAL

## 2024-01-02 NOTE — TELEPHONE ENCOUNTER
Harish Anaya (Key: [id number])  PA Case ID #: [id number]  Need Help? Call us at [phone]  Outcome  Approved on December 29, 2023  PA Case: [id number], Status: Approved, Coverage Starts on: 12/29/2023 12:00 AM, Coverage Ends on: 12/29/2024 12:00 AM. Questions? Contact [phone].  Authorization Expiration Date: 12/28/2024  Drug  Testosterone Cypionate 200MG/ML intramuscular solution  ePA cloud logo  Form  [employer] Electronic Prior Authorization Form (2017 [employer])

## 2024-05-20 DIAGNOSIS — E22.1 HYPERPROLACTINEMIA: ICD-10-CM

## 2024-05-20 RX ORDER — CABERGOLINE 0.5 MG/1
0.25 TABLET ORAL
Qty: 8 TABLET | Refills: 0 | Status: SHIPPED | OUTPATIENT
Start: 2024-05-20

## 2024-05-20 NOTE — TELEPHONE ENCOUNTER
Caller: Balbina Anaya    Relationship: Emergency Contact    Best call back number: [phone]    Requested Prescriptions: cabergoline (DOSTINEX) 0.5 MG tablet ()   Requested Prescriptions      No prescriptions requested or ordered in this encounter        Pharmacy where request should be sent: [facility]     Last office visit with prescribing clinician: 2023   Last telemedicine visit with prescribing clinician: Visit date not found   Next office visit with prescribing clinician: 2024     Additional details provided by patient: PT NEEDS A REFILL ON THIS MEDICATION AND IS COMPLETELY OUT OF IT.     Does the patient have less than a 3 day supply:  [x] Yes  [] No    Would you like a call back once the refill request has been completed: [x] Yes [] No    If the office needs to give you a call back, can they leave a voicemail: [x] Yes [] No    Elaine Adam Rep   24 11:59 EDT

## 2024-06-11 ENCOUNTER — LAB (OUTPATIENT)
Dept: LAB | Facility: HOSPITAL | Age: 54
End: 2024-06-11
Payer: COMMERCIAL

## 2024-06-11 DIAGNOSIS — E29.1 HYPOGONADISM IN MALE: ICD-10-CM

## 2024-06-11 DIAGNOSIS — E78.2 MIXED HYPERLIPIDEMIA: ICD-10-CM

## 2024-06-11 DIAGNOSIS — R73.03 PREDIABETES: ICD-10-CM

## 2024-06-11 DIAGNOSIS — E55.9 VITAMIN D DEFICIENCY: ICD-10-CM

## 2024-06-11 DIAGNOSIS — E22.1 HYPERPROLACTINEMIA: ICD-10-CM

## 2024-06-11 LAB
25(OH)D3 SERPL-MCNC: 22 NG/ML (ref 30–100)
ALBUMIN SERPL-MCNC: 4.2 G/DL (ref 3.5–5.2)
ALBUMIN/GLOB SERPL: 2 G/DL
ALP SERPL-CCNC: 56 U/L (ref 39–117)
ALT SERPL W P-5'-P-CCNC: 40 U/L (ref 1–41)
ANION GAP SERPL CALCULATED.3IONS-SCNC: 9 MMOL/L (ref 5–15)
AST SERPL-CCNC: 25 U/L (ref 1–40)
BILIRUB SERPL-MCNC: 0.7 MG/DL (ref 0–1.2)
BUN SERPL-MCNC: 10 MG/DL (ref 6–20)
BUN/CREAT SERPL: 9 (ref 7–25)
CALCIUM SPEC-SCNC: 9.1 MG/DL (ref 8.6–10.5)
CHLORIDE SERPL-SCNC: 104 MMOL/L (ref 98–107)
CHOLEST SERPL-MCNC: 218 MG/DL (ref 0–200)
CO2 SERPL-SCNC: 26 MMOL/L (ref 22–29)
CREAT SERPL-MCNC: 1.11 MG/DL (ref 0.76–1.27)
DEPRECATED RDW RBC AUTO: 40.4 FL (ref 37–54)
EGFRCR SERPLBLD CKD-EPI 2021: 79.4 ML/MIN/1.73
ERYTHROCYTE [DISTWIDTH] IN BLOOD BY AUTOMATED COUNT: 12.5 % (ref 12.3–15.4)
GLOBULIN UR ELPH-MCNC: 2.1 GM/DL
GLUCOSE SERPL-MCNC: 122 MG/DL (ref 65–99)
HBA1C MFR BLD: 5.7 % (ref 4.8–5.6)
HCT VFR BLD AUTO: 48.9 % (ref 37.5–51)
HDLC SERPL-MCNC: 29 MG/DL (ref 40–60)
HGB BLD-MCNC: 16.7 G/DL (ref 13–17.7)
LDLC SERPL CALC-MCNC: 173 MG/DL (ref 0–100)
LDLC/HDLC SERPL: 5.91 {RATIO}
MCH RBC QN AUTO: 30.5 PG (ref 26.6–33)
MCHC RBC AUTO-ENTMCNC: 34.2 G/DL (ref 31.5–35.7)
MCV RBC AUTO: 89.4 FL (ref 79–97)
PLATELET # BLD AUTO: 187 10*3/MM3 (ref 140–450)
PMV BLD AUTO: 11.2 FL (ref 6–12)
POTASSIUM SERPL-SCNC: 4.6 MMOL/L (ref 3.5–5.2)
PROLACTIN SERPL-MCNC: 5.41 NG/ML (ref 4.04–15.2)
PROT SERPL-MCNC: 6.3 G/DL (ref 6–8.5)
PSA SERPL-MCNC: 0.38 NG/ML (ref 0–4)
RBC # BLD AUTO: 5.47 10*6/MM3 (ref 4.14–5.8)
SODIUM SERPL-SCNC: 139 MMOL/L (ref 136–145)
TESTOST SERPL-MCNC: 547 NG/DL (ref 193–740)
TRIGL SERPL-MCNC: 88 MG/DL (ref 0–150)
VLDLC SERPL-MCNC: 16 MG/DL (ref 5–40)
WBC NRBC COR # BLD AUTO: 6.1 10*3/MM3 (ref 3.4–10.8)

## 2024-06-11 PROCEDURE — 84403 ASSAY OF TOTAL TESTOSTERONE: CPT

## 2024-06-11 PROCEDURE — 85027 COMPLETE CBC AUTOMATED: CPT

## 2024-06-11 PROCEDURE — 84146 ASSAY OF PROLACTIN: CPT

## 2024-06-11 PROCEDURE — 83036 HEMOGLOBIN GLYCOSYLATED A1C: CPT

## 2024-06-11 PROCEDURE — 82306 VITAMIN D 25 HYDROXY: CPT

## 2024-06-11 PROCEDURE — 84153 ASSAY OF PSA TOTAL: CPT

## 2024-06-11 PROCEDURE — 80061 LIPID PANEL: CPT | Performed by: INTERNAL MEDICINE

## 2024-06-11 PROCEDURE — 80053 COMPREHEN METABOLIC PANEL: CPT

## 2024-06-11 PROCEDURE — 36415 COLL VENOUS BLD VENIPUNCTURE: CPT

## 2024-06-17 ENCOUNTER — OFFICE VISIT (OUTPATIENT)
Dept: ENDOCRINOLOGY | Facility: CLINIC | Age: 54
End: 2024-06-17
Payer: COMMERCIAL

## 2024-06-17 VITALS
OXYGEN SATURATION: 96 % | HEART RATE: 78 BPM | SYSTOLIC BLOOD PRESSURE: 122 MMHG | WEIGHT: 291 LBS | BODY MASS INDEX: 39.47 KG/M2 | DIASTOLIC BLOOD PRESSURE: 80 MMHG

## 2024-06-17 DIAGNOSIS — E29.1 HYPOGONADISM IN MALE: ICD-10-CM

## 2024-06-17 DIAGNOSIS — E55.9 VITAMIN D DEFICIENCY: ICD-10-CM

## 2024-06-17 DIAGNOSIS — D35.2 PITUITARY MICROADENOMA: ICD-10-CM

## 2024-06-17 DIAGNOSIS — E22.1 HYPERPROLACTINEMIA: Primary | ICD-10-CM

## 2024-06-17 DIAGNOSIS — R73.03 PREDIABETES: ICD-10-CM

## 2024-06-17 DIAGNOSIS — E78.2 MIXED HYPERLIPIDEMIA: ICD-10-CM

## 2024-06-17 PROCEDURE — 99214 OFFICE O/P EST MOD 30 MIN: CPT | Performed by: INTERNAL MEDICINE

## 2024-06-17 RX ORDER — CABERGOLINE 0.5 MG/1
0.25 TABLET ORAL
Qty: 8 TABLET | Refills: 1 | Status: SHIPPED | OUTPATIENT
Start: 2024-06-17

## 2024-06-17 NOTE — PROGRESS NOTES
Chief Complaint   Patient presents with    Hyperprolactinemia    Prediabetes    Hypogonadism          HPI   Harish Anaya is a 54 y.o. male had concerns including Hyperprolactinemia, Prediabetes, and Hypogonadism.      Labs checked recently.   All look great.   Is on testosterone 0.8 ml (160 mg) q 14 days.   On cabergoline 0.25 mg weekly.     A1c down to 5.7 recently from 5.9.   Weight stable/down a lb.    LDL has trended up.     Has retired. Been very busy at home.     Hasn't been taking vit D as consistently recently.     The following portions of the patient's history were reviewed and updated as appropriate: allergies, current medications, past family history, past medical history, past social history, past surgical history, and problem list.      Review of Systems   Constitutional: Negative.    Endocrine: Negative.         Physical Exam  Vitals reviewed.   Constitutional:       Appearance: Normal appearance. He is obese.      Comments: Body mass index is 39.47 kg/m².   Cardiovascular:      Rate and Rhythm: Normal rate.   Pulmonary:      Effort: Pulmonary effort is normal.   Neurological:      General: No focal deficit present.      Mental Status: He is alert. Mental status is at baseline.   Psychiatric:         Mood and Affect: Mood normal.         Behavior: Behavior normal.        /80   Pulse 78   Wt 132 kg (291 lb)   SpO2 96%   BMI 39.47 kg/m²      Labs and imaging    CMP:  Lab Results   Component Value Date    BUN 10 06/11/2024    CREATININE 1.11 06/11/2024    EGFR 79.4 06/11/2024    BCR 9.0 06/11/2024     06/11/2024    K 4.6 06/11/2024    CO2 26.0 06/11/2024    CALCIUM 9.1 06/11/2024    ALBUMIN 4.2 06/11/2024    BILITOT 0.7 06/11/2024    ALKPHOS 56 06/11/2024    AST 25 06/11/2024    ALT 40 06/11/2024     Lipid Panel:  Lab Results   Component Value Date    CHOL 218 (H) 06/11/2024    CHOL 211 (H) 05/31/2023    TRIG 88 06/11/2024    TRIG 121 05/31/2023    HDL 29 (L) 06/11/2024    HDL  31 (L) 05/31/2023    VLDL 16 06/11/2024    VLDL 22 05/31/2023     (H) 06/11/2024     (H) 05/31/2023     (H) 05/28/2022     (H) 02/05/2022     (H) 09/05/2020    LDL 85 05/30/2020     HbA1c:  Lab Results   Component Value Date    HGBA1C 5.70 (H) 06/11/2024    HGBA1C 5.90 (H) 11/28/2023     Glucose:  Lab Results   Component Value Date    POCGLU 121 06/05/2023     TSH:  Lab Results   Component Value Date    TSH 1.410 02/05/2022     Lab Results   Component Value Date    TESTOSTEROTT 165 (L) 09/25/2021    TESTOSTERONE 547.00 06/11/2024    TESTOSTERONE 505.00 11/28/2023    TESTOSTERONE 382.00 05/31/2023    PSA 0.378 06/11/2024    PROLACTIN 5.41 06/11/2024    PROLACTIN 5.86 11/28/2023    PROLACTIN 9.08 05/31/2023    PROLACTIN 6.23 01/13/2023    PROLACTIN 9.47 05/28/2022    PROLACTIN 22.30 (H) 02/05/2022    PROLACTIN 8.14 09/25/2021    PROLACTIN 4.64 05/21/2021    PROLACTIN 9.82 01/18/2021    PROLACTIN 0.78 (L) 09/05/2020    PROLACTIN 0.70 (L) 05/30/2020    PROLACTIN 126.00 (H) 03/04/2020    PROLACTIN 115.00 (H) 02/22/2020    PROLACTIN 34.9 (H) 01/23/2020     Lab Results   Component Value Date    WOHB02TM 22.0 (L) 06/11/2024    QNTK59EO 42.4 11/28/2023    BYFN13NJ 27.8 (L) 05/31/2023    ECIQ13MT 27.0 (L) 05/28/2022        PROCEDURE: MRI PITUITARY W WO CONTRAST-     HISTORY: Pituitary adenoma, known or suspected; D35.2-Benign neoplasm of  pituitary gland     PROCEDURE: Multiplanar multisequence imaging of the brain was performed  both before and following the administration of 15 mL MultiHance  intravenous contrast.     COMPARISON: 8/27/2020 and 2/27/2020.     FINDINGS: There are no significant white matter abnormalities. There is  no mass, mass effect or midline shift. There is no hydrocephalus. There  are no areas of restricted diffusion. There is no pathologic contrast  enhancement.     The midbrain, yasmin, cerebellum and craniocervical junction are  unremarkable. Dynamic imaging through  the pituitary gland demonstrates  interval decrease in size of the lesion in the left aspects of the  pituitary gland which measures approximately 2 mm on today's exam. The  major intracranial vasculature demonstrates the expected flow related  signal. The paranasal sinuses are clear.     IMPRESSION:  Decrease in size of the patient's pituitary adenoma which  measures 2 mm on today's exam.        This report was finalized on 8/10/2021 3:33 PM by April Nagy M.D.       Assessment and plan  Diagnoses and all orders for this visit:    1. Hyperprolactinemia (Primary)  -     cabergoline (DOSTINEX) 0.5 MG tablet; Take 0.5 tablets by mouth Every 7 (Seven) Days.  Dispense: 8 tablet; Refill: 1  2. Pituitary microadenoma  Diagnosed 2020. On cabergoline 0.25 mg weekly. Level stable. Monitor q 6 months. Refill sent.   MRI from 8/10/2021 showed decrease in size of the pituitary adenoma, down to 2 mm.  Prolactin levels responding appropriately. Routine imaging isn't needed.     3. Prediabetes  A1c stable at 5.7.     4. Hypogonadism in male  Testosterone levels look great. Continue 0.8 ml q 2 q weeks. Is getting through medical office.     5. Mixed hyperlipidemia  LDL has trended up. Better in the past on a vegan diet. Was on a statin in the past. Prefers to stay off and manage with diet/lifestyle.     6. Vitamin D deficiency  Low. Wasn't taking vit D supplement consistently. Has resumed taking.          Return in about 6 months (around 12/17/2024) for next scheduled follow up. The patient was instructed to contact the clinic with any interval questions or concerns.    Electronically signed by: Elma Thomas DO   Endocrinologist    Please note that portions of this note were completed with a voice recognition program.

## 2024-06-19 ENCOUNTER — OFFICE VISIT (OUTPATIENT)
Dept: INTERNAL MEDICINE | Facility: CLINIC | Age: 54
End: 2024-06-19
Payer: COMMERCIAL

## 2024-06-19 VITALS
BODY MASS INDEX: 39.14 KG/M2 | RESPIRATION RATE: 17 BRPM | HEART RATE: 72 BPM | HEIGHT: 72 IN | WEIGHT: 289 LBS | TEMPERATURE: 97.9 F | SYSTOLIC BLOOD PRESSURE: 128 MMHG | OXYGEN SATURATION: 98 % | DIASTOLIC BLOOD PRESSURE: 82 MMHG

## 2024-06-19 DIAGNOSIS — D35.2 PITUITARY MICROADENOMA: ICD-10-CM

## 2024-06-19 DIAGNOSIS — G47.33 OBSTRUCTIVE SLEEP APNEA: ICD-10-CM

## 2024-06-19 DIAGNOSIS — D35.2 PROLACTINOMA: ICD-10-CM

## 2024-06-19 DIAGNOSIS — R73.03 PREDIABETES: ICD-10-CM

## 2024-06-19 DIAGNOSIS — Z80.0 FAMILY HISTORY OF PANCREATIC CANCER: ICD-10-CM

## 2024-06-19 DIAGNOSIS — E66.01 CLASS 2 SEVERE OBESITY DUE TO EXCESS CALORIES WITH SERIOUS COMORBIDITY AND BODY MASS INDEX (BMI) OF 39.0 TO 39.9 IN ADULT: ICD-10-CM

## 2024-06-19 DIAGNOSIS — E29.1 HYPOGONADISM IN MALE: ICD-10-CM

## 2024-06-19 DIAGNOSIS — E78.2 MULTIPLE-TYPE HYPERLIPIDEMIA: ICD-10-CM

## 2024-06-19 DIAGNOSIS — Z00.00 ENCOUNTER FOR PREVENTIVE HEALTH EXAMINATION: Primary | ICD-10-CM

## 2024-06-19 PROCEDURE — 99396 PREV VISIT EST AGE 40-64: CPT | Performed by: INTERNAL MEDICINE

## 2024-06-19 NOTE — PATIENT INSTRUCTIONS
Health Maintenance, Male  Adopting a healthy lifestyle and getting preventive care are important in promoting health and wellness. Ask your health care provider about:  The right schedule for you to have regular tests and exams.  Things you can do on your own to prevent diseases and keep yourself healthy.  What should I know about diet, weight, and exercise?  Eat a healthy diet    Eat a diet that includes plenty of vegetables, fruits, low-fat dairy products, and lean protein.  Do not eat a lot of foods that are high in solid fats, added sugars, or sodium.  Maintain a healthy weight  Body mass index (BMI) is a measurement that can be used to identify possible weight problems. It estimates body fat based on height and weight. Your health care provider can help determine your BMI and help you achieve or maintain a healthy weight.  Get regular exercise  Get regular exercise. This is one of the most important things you can do for your health. Most adults should:  Exercise for at least 150 minutes each week. The exercise should increase your heart rate and make you sweat (moderate-intensity exercise).  Do strengthening exercises at least twice a week. This is in addition to the moderate-intensity exercise.  Spend less time sitting. Even light physical activity can be beneficial.  Watch cholesterol and blood lipids  Have your blood tested for lipids and cholesterol at 20 years of age, then have this test every 5 years.  You may need to have your cholesterol levels checked more often if:  Your lipid or cholesterol levels are high.  You are older than 40 years of age.  You are at high risk for heart disease.  What should I know about cancer screening?  Many types of cancers can be detected early and may often be prevented. Depending on your health history and family history, you may need to have cancer screening at various ages. This may include screening for:  Colorectal cancer.  Prostate cancer.  Skin cancer.  Lung  cancer.  What should I know about heart disease, diabetes, and high blood pressure?  Blood pressure and heart disease  High blood pressure causes heart disease and increases the risk of stroke. This is more likely to develop in people who have high blood pressure readings or are overweight.  Talk with your health care provider about your target blood pressure readings.  Have your blood pressure checked:  Every 3-5 years if you are 18-39 years of age.  Every year if you are 40 years old or older.  If you are between the ages of 65 and 75 and are a current or former smoker, ask your health care provider if you should have a one-time screening for abdominal aortic aneurysm (AAA).  Diabetes  Have regular diabetes screenings. This checks your fasting blood sugar level. Have the screening done:  Once every three years after age 45 if you are at a normal weight and have a low risk for diabetes.  More often and at a younger age if you are overweight or have a high risk for diabetes.  What should I know about preventing infection?  Hepatitis B  If you have a higher risk for hepatitis B, you should be screened for this virus. Talk with your health care provider to find out if you are at risk for hepatitis B infection.  Hepatitis C  Blood testing is recommended for:  Everyone born from 1945 through 1965.  Anyone with known risk factors for hepatitis C.  Sexually transmitted infections (STIs)  You should be screened each year for STIs, including gonorrhea and chlamydia, if:  You are sexually active and are younger than 24 years of age.  You are older than 24 years of age and your health care provider tells you that you are at risk for this type of infection.  Your sexual activity has changed since you were last screened, and you are at increased risk for chlamydia or gonorrhea. Ask your health care provider if you are at risk.  Ask your health care provider about whether you are at high risk for HIV. Your health care provider  may recommend a prescription medicine to help prevent HIV infection. If you choose to take medicine to prevent HIV, you should first get tested for HIV. You should then be tested every 3 months for as long as you are taking the medicine.  Follow these instructions at home:  Alcohol use  Do not drink alcohol if your health care provider tells you not to drink.  If you drink alcohol:  Limit how much you have to 0-2 drinks a day.  Know how much alcohol is in your drink. In the [place] one drink equals one 12 oz bottle of beer (355 mL), one 5 oz glass of wine (148 mL), or one 1½ oz glass of hard liquor (44 mL).  Lifestyle  Do not use any products that contain nicotine or tobacco. These products include cigarettes, chewing tobacco, and vaping devices, such as e-cigarettes. If you need help quitting, ask your health care provider.  Do not use street drugs.  Do not share needles.  Ask your health care provider for help if you need support or information about quitting drugs.  General instructions  Schedule regular health, dental, and eye exams.  Stay current with your vaccines.  Tell your health care provider if:  You often feel depressed.  You have ever been abused or do not feel safe at home.  Summary  Adopting a healthy lifestyle and getting preventive care are important in promoting health and wellness.  Follow your health care provider's instructions about healthy diet, exercising, and getting tested or screened for diseases.  Follow your health care provider's instructions on monitoring your cholesterol and blood pressure.  This information is not intended to replace advice given to you by your health care provider. Make sure you discuss any questions you have with your health care provider.  Document Revised: 05/09/2022 Document Reviewed: 05/09/2022  Elsevier Patient Education © 2024 [employer]

## 2024-06-19 NOTE — PROGRESS NOTES
Chief Complaint   Patient presents with    Annual Exam       Subjective     History of Present Illness   Harish Anaya is a 54 y.o. male presenting for annual physical.  Preventive health maintenance was reviewed and discussed today. Vaccines were updated.  Patient continues to follow with endocrinology and is doing well with cabergoline and testosterone.  Amitriptyline is also doing well for sleep.    Patient did have concerns about family history of pancreatic cancer.  He shares that his 2 uncles and his sister had pancreatic cancer all suffered terminal illness and were in 50-60s.  He understands that there are can be genetic testing to evaluate and consider further screening for pancreatic cancer.      The following portions of the patient's history were reviewed and updated as appropriate: allergies, current medications, past family history, past medical history, past social history, past surgical history and problem list.    Review of Systems   Constitutional:  Negative for chills, fatigue and fever.   HENT:  Negative for congestion, ear pain, rhinorrhea, sinus pressure and sore throat.    Eyes:  Negative for visual disturbance.   Respiratory:  Negative for cough, chest tightness, shortness of breath and wheezing.    Cardiovascular:  Negative for chest pain, palpitations and leg swelling.   Gastrointestinal:  Negative for abdominal pain, blood in stool, constipation, diarrhea, nausea and vomiting.   Endocrine: Negative for polydipsia and polyuria.   Genitourinary:  Negative for dysuria and hematuria.   Musculoskeletal:  Negative for arthralgias and back pain.   Skin:  Negative for rash.   Neurological:  Negative for dizziness, light-headedness, numbness and headaches.   Psychiatric/Behavioral:  Negative for dysphoric mood and sleep disturbance. The patient is not nervous/anxious.        No Known Allergies    Past Medical History:   Diagnosis Date    Allergic rhinitis     BMI 38.0-38.9,adult      Diabetes mellitus     borderline    Diverticulosis of colon without hemorrhage     Dizziness     Heartburn     Hyperlipidemia     Hypopituitarism     Malaise and fatigue     Pituitary adenoma     Polyuria     PONV (postoperative nausea and vomiting)     Sore throat     Testosterone deficiency     Vegetarian diet     since 3 wk ago    Vitamin D deficiency 10/15/21       Social History     Socioeconomic History    Marital status:    Tobacco Use    Smoking status: Former     Current packs/day: 0.00     Types: Cigarettes     Start date: 1988     Quit date: 1990     Years since quittin.4     Passive exposure: Never    Smokeless tobacco: Never    Tobacco comments:     Was never a heavy smoker   Vaping Use    Vaping status: Never Used   Substance and Sexual Activity    Alcohol use: No    Drug use: No    Sexual activity: Defer        Past Surgical History:   Procedure Laterality Date    APPENDECTOMY      CHOLECYSTECTOMY      COLONOSCOPY      complete     COLONOSCOPY N/A 2020    Procedure: COLONOSCOPY;  Surgeon: Ruddy Lara MD;  Location: [facility] ENDOSCOPY;  Service: Gastroenterology    ELBOW PROCEDURE         Family History   Problem Relation Age of Onset    Depression Mother     Cancer Maternal Grandmother     Melanoma Maternal Grandmother     Parkinsonism Maternal Grandfather     Diabetes Maternal Aunt     Alcohol abuse Maternal Uncle     Liver disease Maternal Uncle     Colon cancer Neg Hx     Cirrhosis Neg Hx     Liver cancer Neg Hx          Current Outpatient Medications:     amitriptyline (ELAVIL) 10 MG tablet, Take 1 tablet by mouth At Night As Needed for Sleep., Disp: 90 tablet, Rfl: 3    Ascorbic Acid (VITAMIN C GUMMIE PO), Take  by mouth Daily., Disp: , Rfl:     cabergoline (DOSTINEX) 0.5 MG tablet, Take 0.5 tablets by mouth Every 7 (Seven) Days., Disp: 8 tablet, Rfl: 1    cholecalciferol (VITAMIN D3) 25 MCG (1000 UT) tablet, Take 1 tablet by mouth Daily., Disp: , Rfl:     Multiple  "Vitamins-Minerals (MULTIVITAMIN WITH MINERALS) tablet tablet, Take 1 tablet by mouth 2 (Two) Times a Day., Disp: , Rfl:     Needle, Disp, (Hypodermic Needle) 22G X 1\" misc, Use for testosterone injection, Disp: 6 each, Rfl: 3    Needle, Disp, (MONOJECT HYPO 18GX1\") 18G X 1\" misc, Use for testosterone injection, Disp: 6 each, Rfl: 3    Probiotic capsule, Take 1 capsule by mouth Daily., Disp: 30 capsule, Rfl: 1    Testosterone Cypionate (DEPOTESTOTERONE CYPIONATE) 200 MG/ML injection, Inject 0.8 mL into the muscle Every 14 (Fourteen) Days as directed, Disp: 13 mL, Rfl: 1    Objective   /82   Pulse 72   Temp 97.9 °F (36.6 °C)   Resp 17   Ht 182.9 cm (72\")   Wt 131 kg (289 lb)   SpO2 98%   BMI 39.20 kg/m²     Physical Exam  Vitals and nursing note reviewed.   Constitutional:       Appearance: Normal appearance. He is well-developed. He is obese.   HENT:      Head: Normocephalic and atraumatic.      Right Ear: Tympanic membrane and external ear normal.      Left Ear: Tympanic membrane and external ear normal.      Nose: Nose normal. No congestion.      Mouth/Throat:      Mouth: Mucous membranes are moist.      Pharynx: No oropharyngeal exudate.   Eyes:      General: No scleral icterus.        Right eye: No discharge.      Pupils: Pupils are equal, round, and reactive to light.   Neck:      Thyroid: No thyromegaly.      Vascular: No JVD.   Cardiovascular:      Rate and Rhythm: Normal rate and regular rhythm.      Heart sounds: Normal heart sounds. No murmur heard.     No friction rub.   Pulmonary:      Effort: Pulmonary effort is normal. No respiratory distress.      Breath sounds: Normal breath sounds. No stridor. No wheezing.   Abdominal:      General: Bowel sounds are normal. There is no distension.      Palpations: Abdomen is soft.      Tenderness: There is no abdominal tenderness. There is no guarding.   Genitourinary:     Comments: Deferred  Musculoskeletal:         General: No tenderness. Normal range " of motion.      Cervical back: Normal range of motion and neck supple.   Lymphadenopathy:      Cervical: No cervical adenopathy.   Skin:     General: Skin is warm and dry.      Findings: No rash.   Neurological:      Mental Status: He is alert and oriented to person, place, and time.      Cranial Nerves: No cranial nerve deficit.   Psychiatric:         Mood and Affect: Mood normal.         Behavior: Behavior normal.         Thought Content: Thought content normal.         Assessment & Plan   Diagnoses and all orders for this visit:    1. Encounter for preventive health examination (Primary)    2. Multiple-type hyperlipidemia    3. Class 2 severe obesity due to excess calories with serious comorbidity and body mass index (BMI) of 39.0 to 39.9 in adult    4. Prolactinoma    5. Pituitary microadenoma    6. Hypogonadism in male    7. Obstructive sleep apnea    8. Family history of pancreatic cancer    9. Prediabetes        Results:  Results for orders placed or performed in visit on 06/11/24   Vitamin D,25-Hydroxy    Specimen: Blood   Result Value Ref Range    25 Hydroxy, Vitamin D 22.0 (L) 30.0 - 100.0 ng/ml   Comprehensive Metabolic Panel    Specimen: Blood   Result Value Ref Range    Glucose 122 (H) 65 - 99 mg/dL    BUN 10 6 - 20 mg/dL    Creatinine 1.11 0.76 - 1.27 mg/dL    Sodium 139 136 - 145 mmol/L    Potassium 4.6 3.5 - 5.2 mmol/L    Chloride 104 98 - 107 mmol/L    CO2 26.0 22.0 - 29.0 mmol/L    Calcium 9.1 8.6 - 10.5 mg/dL    Total Protein 6.3 6.0 - 8.5 g/dL    Albumin 4.2 3.5 - 5.2 g/dL    ALT (SGPT) 40 1 - 41 U/L    AST (SGOT) 25 1 - 40 U/L    Alkaline Phosphatase 56 39 - 117 U/L    Total Bilirubin 0.7 0.0 - 1.2 mg/dL    Globulin 2.1 gm/dL    A/G Ratio 2.0 g/dL    BUN/Creatinine Ratio 9.0 7.0 - 25.0    Anion Gap 9.0 5.0 - 15.0 mmol/L    eGFR 79.4 >60.0 mL/min/1.73   CBC (No Diff)    Specimen: Blood   Result Value Ref Range    WBC 6.10 3.40 - 10.80 10*3/mm3    RBC 5.47 4.14 - 5.80 10*6/mm3    Hemoglobin 16.7  13.0 - 17.7 g/dL    Hematocrit 48.9 37.5 - 51.0 %    MCV 89.4 79.0 - 97.0 fL    MCH 30.5 26.6 - 33.0 pg    MCHC 34.2 31.5 - 35.7 g/dL    RDW 12.5 12.3 - 15.4 %    RDW-SD 40.4 37.0 - 54.0 fl    MPV 11.2 6.0 - 12.0 fL    Platelets 187 140 - 450 10*3/mm3   Hemoglobin A1c    Specimen: Blood   Result Value Ref Range    Hemoglobin A1C 5.70 (H) 4.80 - 5.60 %   Testosterone    Specimen: Blood   Result Value Ref Range    Testosterone, Total 547.00 193.00 - 740.00 ng/dL   Prolactin    Specimen: Blood   Result Value Ref Range    Prolactin 5.41 4.04 - 15.20 ng/mL   PSA DIAGNOSTIC    Specimen: Blood   Result Value Ref Range    PSA 0.378 0.000 - 4.000 ng/mL         Discussion Summary:  Patient is a 54 y.o. male presenting for annual physical    Preventive Health Maintenance  - Baseline labs are up-to-date or ordered per above.  - Vaccines reviewed and updated  - Preventive health measures were discussed including: healthy diet with increase in fruits and vegetables, regular exercise at least 3 times a week, safe sex practices, avoidance of drugs, tobacco, and alcohol, and regular seatbelt use.    Obesity  - Weight loss options were discussed in detail including reducing fried, fatty, and sugary foods with diet control. A regular exercise regimen was discussed.  Class 2 Severe Obesity (BMI >=35 and <=39.9). Obesity-related health conditions include the following: obstructive sleep apnea, hypertension, and coronary heart disease. Obesity is improving with lifestyle modifications. BMI is is above average; BMI management plan is completed. We discussed portion control and increasing exercise.     Family history of pancreatic cancer  - May consider obtaining BRCA1, BRCA1, and MEN2 genetic tests.  This may be better evaluated by GI or genetics.  I will discuss with Dr. Greenwood regarding preferred work up.      Obesity  - Weight loss options were discussed in detail including reducing fried, fatty, and sugary foods with diet control. A  regular exercise regimen was discussed.  Class 2 Severe Obesity (BMI >=35 and <=39.9). Obesity-related health conditions include the following: obstructive sleep apnea, hypertension, and coronary heart disease. Obesity is improving with lifestyle modifications. BMI is is above average; BMI management plan is completed. We discussed portion control and increasing exercise.     Pituitary tumor  - Continue cabergoline    Testosterone deficiency  - Continue testosterone replacement under endocrinology.    Insomnia  - Stable on amitriptyline nightly.             Follow up:  Return in about 1 year (around 6/19/2025) for Annual physical.     Patient Instructions:  Patient instructions were provided.

## 2024-06-25 DIAGNOSIS — E29.1 HYPOGONADISM IN MALE: ICD-10-CM

## 2024-06-25 RX ORDER — TESTOSTERONE CYPIONATE 200 MG/ML
INJECTION, SOLUTION INTRAMUSCULAR
Qty: 13 ML | Refills: 1 | Status: CANCELLED | OUTPATIENT
Start: 2024-06-25

## 2024-06-25 NOTE — TELEPHONE ENCOUNTER
Caller: Calos Anayaie    Relationship: Emergency Contact    Best call back number: [phone]    Requested Prescriptions:   Requested Prescriptions     Pending Prescriptions Disp Refills    Testosterone Cypionate (DEPOTESTOTERONE CYPIONATE) 200 MG/ML injection 13 mL 1     Sig: Inject 0.8 mL into the muscle Every 14 (Fourteen) Days as directed        Pharmacy where request should be sent:  [facility]    Last office visit with prescribing clinician: 6/17/2024   Last telemedicine visit with prescribing clinician: Visit date not found   Next office visit with prescribing clinician: 12/30/2024     Additional details provided by patient:     Does the patient have less than a 3 day supply:  [] Yes  [] No    Would you like a call back once the refill request has been completed: [] Yes [] No    If the office needs to give you a call back, can they leave a voicemail: [] Yes [] No    Elaine Ortega Rep   06/25/24 10:11 EDT

## 2024-06-26 RX ORDER — TESTOSTERONE CYPIONATE 200 MG/ML
INJECTION, SOLUTION INTRAMUSCULAR
Qty: 13 ML | Refills: 1 | Status: SHIPPED | OUTPATIENT
Start: 2024-06-26

## 2024-09-13 RX ORDER — AZITHROMYCIN 250 MG/1
TABLET, FILM COATED ORAL
Qty: 6 TABLET | Refills: 0 | Status: SHIPPED | OUTPATIENT
Start: 2024-09-13

## 2024-12-19 ENCOUNTER — TELEPHONE (OUTPATIENT)
Dept: ENDOCRINOLOGY | Facility: CLINIC | Age: 54
End: 2024-12-19
Payer: COMMERCIAL

## 2024-12-19 ENCOUNTER — PATIENT MESSAGE (OUTPATIENT)
Dept: ENDOCRINOLOGY | Facility: CLINIC | Age: 54
End: 2024-12-19
Payer: COMMERCIAL

## 2024-12-19 DIAGNOSIS — E55.9 VITAMIN D DEFICIENCY: ICD-10-CM

## 2024-12-19 DIAGNOSIS — R73.03 PREDIABETES: ICD-10-CM

## 2024-12-19 DIAGNOSIS — E22.1 HYPERPROLACTINEMIA: ICD-10-CM

## 2024-12-19 DIAGNOSIS — E29.1 HYPOGONADISM IN MALE: Primary | ICD-10-CM

## 2024-12-19 NOTE — TELEPHONE ENCOUNTER
PATIENT IS SUPPOSE TO HAVE LABS DRAWN NOW, HE NEEDS ORDERS PLACED IN CHART TO GET LABS DRAWN. HE IS GOING TO GET LABS DRAWN TOMORROW AS TOMORROW IS THE DAY DR. BROWN WANTED PATIENT TO HAVE LABS DRAWN. ONCE ORDERS ARE PLACED IN CHART TO DAY PLEASE CALL TO LET PATIENT KNOW THEY ARE PLACED IN CHART SO HE CAN HAVE LABS DRAWN. PHONE NUMBER [phone]

## 2024-12-21 ENCOUNTER — LAB (OUTPATIENT)
Dept: LAB | Facility: HOSPITAL | Age: 54
End: 2024-12-21
Payer: COMMERCIAL

## 2024-12-21 DIAGNOSIS — E29.1 HYPOGONADISM IN MALE: ICD-10-CM

## 2024-12-21 DIAGNOSIS — R73.03 PREDIABETES: ICD-10-CM

## 2024-12-21 DIAGNOSIS — E55.9 VITAMIN D DEFICIENCY: ICD-10-CM

## 2024-12-21 DIAGNOSIS — E22.1 HYPERPROLACTINEMIA: ICD-10-CM

## 2024-12-21 LAB
25(OH)D3 SERPL-MCNC: 22.6 NG/ML (ref 30–100)
HBA1C MFR BLD: 5.8 % (ref 4.8–5.6)
PROLACTIN SERPL-MCNC: 7.55 NG/ML (ref 4.04–15.2)
TESTOST SERPL-MCNC: 1070 NG/DL (ref 193–740)

## 2024-12-21 PROCEDURE — 82306 VITAMIN D 25 HYDROXY: CPT

## 2024-12-21 PROCEDURE — 84146 ASSAY OF PROLACTIN: CPT

## 2024-12-21 PROCEDURE — 84403 ASSAY OF TOTAL TESTOSTERONE: CPT

## 2024-12-21 PROCEDURE — 83036 HEMOGLOBIN GLYCOSYLATED A1C: CPT

## 2024-12-30 ENCOUNTER — OFFICE VISIT (OUTPATIENT)
Dept: ENDOCRINOLOGY | Facility: CLINIC | Age: 54
End: 2024-12-30
Payer: COMMERCIAL

## 2024-12-30 VITALS
HEART RATE: 77 BPM | BODY MASS INDEX: 41.12 KG/M2 | OXYGEN SATURATION: 96 % | HEIGHT: 72 IN | WEIGHT: 303.6 LBS | DIASTOLIC BLOOD PRESSURE: 98 MMHG | SYSTOLIC BLOOD PRESSURE: 132 MMHG

## 2024-12-30 DIAGNOSIS — D35.2 PITUITARY MICROADENOMA: ICD-10-CM

## 2024-12-30 DIAGNOSIS — E55.9 VITAMIN D DEFICIENCY: ICD-10-CM

## 2024-12-30 DIAGNOSIS — R73.03 PREDIABETES: ICD-10-CM

## 2024-12-30 DIAGNOSIS — E22.1 HYPERPROLACTINEMIA: ICD-10-CM

## 2024-12-30 DIAGNOSIS — E29.1 HYPOGONADISM IN MALE: Primary | ICD-10-CM

## 2024-12-30 LAB
EXPIRATION DATE: ABNORMAL
EXPIRATION DATE: NORMAL
GLUCOSE BLDC GLUCOMTR-MCNC: 81 MG/DL (ref 70–130)
HBA1C MFR BLD: 6 % (ref 4.5–5.7)
Lab: ABNORMAL
Lab: NORMAL

## 2024-12-30 PROCEDURE — 82947 ASSAY GLUCOSE BLOOD QUANT: CPT | Performed by: INTERNAL MEDICINE

## 2024-12-30 PROCEDURE — 99214 OFFICE O/P EST MOD 30 MIN: CPT | Performed by: INTERNAL MEDICINE

## 2024-12-30 PROCEDURE — 83036 HEMOGLOBIN GLYCOSYLATED A1C: CPT | Performed by: INTERNAL MEDICINE

## 2024-12-30 RX ORDER — CABERGOLINE 0.5 MG/1
0.25 TABLET ORAL
Qty: 8 TABLET | Refills: 1 | Status: SHIPPED | OUTPATIENT
Start: 2024-12-30

## 2024-12-30 RX ORDER — TESTOSTERONE CYPIONATE 200 MG/ML
INJECTION, SOLUTION INTRAMUSCULAR
Qty: 13 ML | Refills: 1 | Status: SHIPPED | OUTPATIENT
Start: 2024-12-30

## 2024-12-30 NOTE — PROGRESS NOTES
"Chief Complaint   Patient presents with    Hyperprolactinemia    Hypogonadism    Diabetes     Prediabetes        HPI   Harish Anaya is a 54 y.o. male had concerns including Hyperprolactinemia, Hypogonadism, and Diabetes (Prediabetes).      He feels fine.   No change in testosterone dosing and levels were checked at midpoint.     Isn't exercising as consistently recently.   A1c trending up. Weight is up.     Feels fine otherwise.     Not taking vit D consistently.       The following portions of the patient's history were reviewed and updated as appropriate: allergies, current medications, past family history, past medical history, past social history, past surgical history, and problem list.    Review of Systems   Constitutional: Negative.    Endocrine: Negative.         /98 (BP Location: Right arm, Patient Position: Sitting)   Pulse 77   Ht 182.9 cm (72.01\")   Wt (!) 138 kg (303 lb 9.6 oz)   SpO2 96%   BMI 41.17 kg/m²      Physical Exam  Vitals reviewed.   Constitutional:       Appearance: Normal appearance. He is obese.      Comments: Body mass index is 41.17 kg/m².   Cardiovascular:      Rate and Rhythm: Normal rate.   Pulmonary:      Effort: Pulmonary effort is normal.   Neurological:      General: No focal deficit present.      Mental Status: He is alert. Mental status is at baseline.   Psychiatric:         Mood and Affect: Mood normal.         Behavior: Behavior normal.              LABS AND IMAGING   CMP  Lab Results   Component Value Date    GLUCOSE 122 (H) 06/11/2024    BUN 10 06/11/2024    CREATININE 1.11 06/11/2024    EGFRIFNONA 77 02/05/2022    EGFRIFAFRI 88 03/16/2017    BCR 9.0 06/11/2024    K 4.6 06/11/2024    CO2 26.0 06/11/2024    CALCIUM 9.1 06/11/2024    PROTENTOTREF 7.4 03/16/2017    ALBUMIN 4.2 06/11/2024    LABIL2 1.7 03/16/2017    AST 25 06/11/2024    ALT 40 06/11/2024        CBC w/DIFF   Lab Results   Component Value Date    WBC 6.10 06/11/2024    RBC 5.47 06/11/2024    " HGB 16.7 06/11/2024    HCT 48.9 06/11/2024    MCV 89.4 06/11/2024    MCH 30.5 06/11/2024    MCHC 34.2 06/11/2024    RDW 12.5 06/11/2024    RDWSD 40.4 06/11/2024    MPV 11.2 06/11/2024     06/11/2024    NEUTRORELPCT 66.9 01/18/2021    LYMPHORELPCT 22.7 01/18/2021    MONORELPCT 8.5 01/18/2021    EOSRELPCT 1.2 01/18/2021    BASORELPCT 0.4 01/18/2021    AUTOIGPER 0.3 01/18/2021    NEUTROABS 4.88 01/18/2021    LYMPHSABS 1.66 01/18/2021    MONOSABS 0.62 01/18/2021    EOSABS 0.09 01/18/2021    BASOSABS 0.03 01/18/2021    AUTOIGNUM 0.02 01/18/2021    NRBC 0.0 01/18/2021     TSH  Lab Results   Component Value Date    TSH 1.410 02/05/2022    TSH 1.070 09/25/2021    TSH 1.340 01/18/2021     T4  Lab Results   Component Value Date    FREET4 0.99 02/05/2022    FREET4 1.31 01/18/2021    FREET4 0.96 03/04/2020     Lab Results   Component Value Date    PROLACTIN 7.55 12/21/2024    PROLACTIN 5.41 06/11/2024    PROLACTIN 5.86 11/28/2023    PROLACTIN 9.08 05/31/2023    PROLACTIN 6.23 01/13/2023    PROLACTIN 9.47 05/28/2022    PROLACTIN 22.30 (H) 02/05/2022    PROLACTIN 8.14 09/25/2021    BGKC96XG 22.6 (L) 12/21/2024    NDOI93IA 22.0 (L) 06/11/2024    [id number] 42.4 11/28/2023    [id number] 27.8 (L) 05/31/2023    [id number] 27.0 (L) 05/28/2022     Hemoglobin A1C   Date Value Ref Range Status   12/30/2024 6.0 (A) 4.5 - 5.7 % Final   12/21/2024 5.80 (H) 4.80 - 5.60 % Final   06/11/2024 5.70 (H) 4.80 - 5.60 % Final   11/28/2023 5.90 (H) 4.80 - 5.60 % Final   05/31/2023 5.70 (H) 4.80 - 5.60 % Final   06/01/2022 5.3 % Final     Lab Results   Component Value Date    TESTOSTERONE 1,070.00 (H) 12/21/2024    TESTOSTERONE 547.00 06/11/2024    TESTOSTERONE 505.00 11/28/2023    TESTOSTERONE 382.00 05/31/2023    TESTOSTERONE 904.00 (H) 01/13/2023    TESTOSTERONE 586.00 02/05/2022    PSA 0.378 06/11/2024         Assessment and Plan    Diagnoses and all orders for this visit:    1. Hypogonadism in male (Primary)  On 0.8 mL every 2 weeks.  Stable on  this dose for some time.  The level was high on 12/21/2024 but was checked at midpoint with no change in dosing.  Recheck with a different/more accurate assay in 3 months to monitor. Level high in the past was normal on recheck.  -     Testosterone (Free & Total), LC / MS; Future  -     Testosterone Cypionate (DEPOTESTOTERONE CYPIONATE) 200 MG/ML injection; Inject 0.8 mL into the muscle Every 14 (Fourteen) Days as directed  Dispense: 13 mL; Refill: 1    2. Pituitary microadenoma  3. Hyperprolactinemia  Diagnosed 2020. On cabergoline 0.25 mg weekly. Level stable. Monitor q 6 months. Refill sent.   MRI from 8/10/2021 showed decrease in size of the pituitary adenoma, down to 2 mm.  Prolactin levels responding appropriately. Routine imaging isn't needed.   -     cabergoline (DOSTINEX) 0.5 MG tablet; Take 0.5 tablets by mouth Every 7 (Seven) Days.  Dispense: 8 tablet; Refill: 1    4. Prediabetes  A1c trending up slightly due to decrease in exercise and weight gain.  Will monitor.  -     POC Glucose, Blood  -     POC Glycosylated Hemoglobin (Hb A1C)  -     Hemoglobin A1c; Future    5. Vitamin D deficiency  Encouraged consistent use of vitamin D.  Has 1000 IU supplement at home.  Recommended to 1000 to 2000 IUs daily or equivalent dose weekly.         Return in about 6 months (around 6/30/2025) for next scheduled follow up. The patient was instructed to contact the clinic with any interval questions or concerns.    Electronically signed by: Elma Thomas DO   Endocrinologist    Please note that portions of this note were completed with a voice recognition program.

## 2025-01-07 ENCOUNTER — TELEPHONE (OUTPATIENT)
Dept: ENDOCRINOLOGY | Facility: CLINIC | Age: 55
End: 2025-01-07
Payer: COMMERCIAL

## 2025-01-07 NOTE — TELEPHONE ENCOUNTER
PATIENTS WIFE IS CALLING STATING PHARMACY SENT US A DENIAL TO GET PRIOR AUTH FOR TESTOSTERONE. PATIENT IS DUE TO TAKE THE INJECTION ON FRIDAY AND IS OUT OF MEDICATION. SHE IS CALLING TO CHECK STATUS. [facility] IS WHO NEEDS PRIOR AUTH FOR THIS MEDICATION. PHONE NUMBER [phone]

## 2025-01-08 ENCOUNTER — PRIOR AUTHORIZATION (OUTPATIENT)
Dept: ENDOCRINOLOGY | Facility: CLINIC | Age: 55
End: 2025-01-08
Payer: COMMERCIAL

## 2025-03-31 ENCOUNTER — LAB (OUTPATIENT)
Dept: LAB | Facility: HOSPITAL | Age: 55
End: 2025-03-31
Payer: COMMERCIAL

## 2025-03-31 DIAGNOSIS — R73.03 PREDIABETES: ICD-10-CM

## 2025-03-31 DIAGNOSIS — E29.1 HYPOGONADISM IN MALE: ICD-10-CM

## 2025-03-31 LAB — HBA1C MFR BLD: 6 % (ref 4.8–5.6)

## 2025-03-31 PROCEDURE — 84402 ASSAY OF FREE TESTOSTERONE: CPT

## 2025-03-31 PROCEDURE — 84403 ASSAY OF TOTAL TESTOSTERONE: CPT

## 2025-03-31 PROCEDURE — 83036 HEMOGLOBIN GLYCOSYLATED A1C: CPT

## 2025-04-05 LAB
TESTOST FREE SERPL-MCNC: 22.6 PG/ML (ref 7.2–24)
TESTOST SERPL-MCNC: 888.3 NG/DL (ref 264–916)

## 2025-04-09 ENCOUNTER — TELEPHONE (OUTPATIENT)
Dept: INTERNAL MEDICINE | Facility: CLINIC | Age: 55
End: 2025-04-09
Payer: COMMERCIAL

## 2025-04-09 NOTE — TELEPHONE ENCOUNTER
Pts wife called to see if pcp can fit him in asap. Pt has a sore in ear that has recurred and now it hurts and he is feeling very sick. They would like him to be seen this week since urgent care didn't help the issue last time. Pls advise

## 2025-04-16 ENCOUNTER — OFFICE VISIT (OUTPATIENT)
Dept: INTERNAL MEDICINE | Facility: CLINIC | Age: 55
End: 2025-04-16
Payer: COMMERCIAL

## 2025-04-16 VITALS
HEIGHT: 72 IN | WEIGHT: 306 LBS | TEMPERATURE: 98.2 F | RESPIRATION RATE: 20 BRPM | OXYGEN SATURATION: 99 % | BODY MASS INDEX: 41.45 KG/M2 | HEART RATE: 70 BPM | SYSTOLIC BLOOD PRESSURE: 139 MMHG | DIASTOLIC BLOOD PRESSURE: 85 MMHG

## 2025-04-16 DIAGNOSIS — R53.82 CHRONIC FATIGUE: ICD-10-CM

## 2025-04-16 DIAGNOSIS — Z12.5 SCREENING FOR PROSTATE CANCER: ICD-10-CM

## 2025-04-16 DIAGNOSIS — E78.49 OTHER HYPERLIPIDEMIA: ICD-10-CM

## 2025-04-16 DIAGNOSIS — E29.1 HYPOGONADISM IN MALE: Primary | ICD-10-CM

## 2025-04-16 DIAGNOSIS — H66.92 LEFT OTITIS MEDIA, UNSPECIFIED OTITIS MEDIA TYPE: ICD-10-CM

## 2025-04-16 NOTE — PROGRESS NOTES
Chief Complaint   Patient presents with    Fatigue     Extreme--beyond tired, had something in Feb and has not recovered    ear infection     Had an bump or cyst in left ear--would like checked--took antibiotics and is not as sore as before       Subjective     History of Present Illness  The patient is a 54-year-old male presenting for a follow-up visit.    He reports experiencing severe fatigue, which he describes as more than just tiredness. This symptom has been progressively worsening since February 2025, when he was in contact with individuals who had COVID-19. Although he did not undergo testing for the virus, he has not felt well since that time. Over the past few weeks, his condition has deteriorated to the point where he only eats out of necessity, not hunger. He has been supplementing his diet with vitamins in an attempt to boost his immune system. He reports no other symptoms but mentions an occasional productive cough. He feels unwell upon waking and experiences exhaustion even after minimal activity. He believes his sleep quality is good, although he does not use a CPAP mask due to intolerance. He has gained some weight and is not fasting today. He has been monitoring his testosterone and vitamin D levels, the latter of which was found to be low. He reports no gastrointestinal issues but notes a lack of appetite over the past week. He also mentions feeling faint after working for 5 to 6 hours, which he attributes to low blood sugar levels.    He also reports an ear infection that caused swelling and difficulty chewing. He sought treatment at an [facility] facility in [place], where he was prescribed ear drops and Keflex. He completed the course of antibiotics a few weeks ago and has not used the ear drops since. He does not report any current ear pressure.    FAMILY HISTORY  His sister passed away from pancreatic cancer in her 50s.    The following portions of the patient's history were reviewed and  updated as appropriate: allergies, current medications, past family history, past medical history, past social history, past surgical history and problem list.    Review of Systems   Constitutional:  Positive for fatigue. Negative for chills, diaphoresis and fever.   HENT:  Negative for congestion and sore throat.    Respiratory:  Negative for cough.    Cardiovascular:  Negative for chest pain.   Gastrointestinal:  Negative for abdominal pain, nausea and vomiting.   Genitourinary:  Negative for dysuria.   Musculoskeletal:  Negative for myalgias and neck pain.   Skin:  Negative for rash.   Neurological:  Negative for weakness, numbness and headaches.       No Known Allergies    Past Medical History:   Diagnosis Date    Allergic rhinitis     BMI 38.0-38.9,adult     Diabetes mellitus     borderline    Diverticulosis of colon without hemorrhage     Dizziness     Heartburn     Hyperlipidemia     Hypopituitarism     Malaise and fatigue     Pituitary adenoma     Polyuria     PONV (postoperative nausea and vomiting)     Sore throat     Testosterone deficiency     Vegetarian diet     since 3 wk ago    Vitamin D deficiency 10/15/21       Social History     Socioeconomic History    Marital status:    Tobacco Use    Smoking status: Former     Current packs/day: 0.00     Average packs/day: 0.5 packs/day for 2.0 years (1.0 ttl pk-yrs)     Types: Cigarettes     Start date: 1988     Quit date: 1990     Years since quittin.3     Passive exposure: Never    Smokeless tobacco: Never    Tobacco comments:     Was never a heavy smoker   Vaping Use    Vaping status: Never Used   Substance and Sexual Activity    Alcohol use: No    Drug use: No    Sexual activity: Defer        Past Surgical History:   Procedure Laterality Date    APPENDECTOMY      CHOLECYSTECTOMY      COLONOSCOPY      complete     COLONOSCOPY N/A 2020    Procedure: COLONOSCOPY;  Surgeon: Ruddy Lara MD;  Location: [facility] ENDOSCOPY;  Service:  "Gastroenterology    ELBOW PROCEDURE         Family History   Problem Relation Age of Onset    Depression Mother     Cancer Maternal Grandmother     Melanoma Maternal Grandmother     Parkinsonism Maternal Grandfather     Diabetes Maternal Aunt     Alcohol abuse Maternal Uncle     Liver disease Maternal Uncle     Colon cancer Neg Hx     Cirrhosis Neg Hx     Liver cancer Neg Hx          Current Outpatient Medications:     cabergoline (DOSTINEX) 0.5 MG tablet, Take 0.5 tablets by mouth Every 7 (Seven) Days., Disp: 8 tablet, Rfl: 1    cholecalciferol (VITAMIN D3) 25 MCG (1000 UT) tablet, Take 1 tablet by mouth Daily., Disp: , Rfl:     Needle, Disp, (Hypodermic Needle) 22G X 1\" misc, Use for testosterone injection, Disp: 6 each, Rfl: 3    Needle, Disp, (MONOJECT HYPO 18GX1\") 18G X 1\" misc, Use for testosterone injection, Disp: 6 each, Rfl: 3    Testosterone Cypionate (DEPOTESTOTERONE CYPIONATE) 200 MG/ML injection, Inject 0.7 mL into the appropriate muscle as directed by prescriber Every 14 (Fourteen) Days. Discard remainder of vial after each dose., Disp: 13 mL, Rfl: 1    amitriptyline (ELAVIL) 10 MG tablet, Take 1 tablet by mouth At Night As Needed for Sleep. (Patient not taking: Reported on 4/16/2025), Disp: 90 tablet, Rfl: 3    amoxicillin-clavulanate (AUGMENTIN) 875-125 MG per tablet, Take 1 tablet by mouth 2 (Two) Times a Day., Disp: 14 tablet, Rfl: 0    Objective   /85   Pulse 70   Temp 98.2 °F (36.8 °C) (Temporal)   Resp 20   Ht 182.9 cm (72\")   Wt (!) 139 kg (306 lb)   SpO2 99%   BMI 41.50 kg/m²     Physical Exam  Vitals and nursing note reviewed.   Constitutional:       Appearance: Normal appearance. He is well-developed.   HENT:      Head: Normocephalic and atraumatic.      Right Ear: Tympanic membrane normal.      Ears:      Comments: Erythematous TM  Eyes:      Extraocular Movements: Extraocular movements intact.      Conjunctiva/sclera: Conjunctivae normal.   Pulmonary:      Effort: Pulmonary " effort is normal.   Musculoskeletal:      Cervical back: Normal range of motion and neck supple.   Skin:     General: Skin is warm and dry.      Findings: No rash.   Neurological:      General: No focal deficit present.      Mental Status: He is alert and oriented to person, place, and time.   Psychiatric:         Mood and Affect: Mood normal.         Behavior: Behavior normal.         Results:  Results for orders placed or performed in visit on 03/31/25   Testosterone (Free & Total), LC / MS    Collection Time: 03/31/25  9:48 AM    Specimen: Blood   Result Value Ref Range    Testosterone, Total 888.3 264.0 - 916.0 ng/dL    Testosterone, Free 22.6 7.2 - 24.0 pg/mL   Hemoglobin A1c    Collection Time: 03/31/25  9:48 AM    Specimen: Blood   Result Value Ref Range    Hemoglobin A1C 6.00 (H) 4.80 - 5.60 %         Assessment & Plan   Diagnoses and all orders for this visit:    1. Hypogonadism in male (Primary)  -     CBC & Differential  -     Comprehensive Metabolic Panel  -     TSH  -     Vitamin B12  -     Vitamin D,25-Hydroxy  -     Lipid Panel    2. Other hyperlipidemia  -     CBC & Differential  -     Comprehensive Metabolic Panel  -     TSH  -     Vitamin B12  -     Vitamin D,25-Hydroxy  -     Lipid Panel    3. Chronic fatigue  -     CBC & Differential  -     Comprehensive Metabolic Panel  -     TSH  -     Vitamin B12  -     Vitamin D,25-Hydroxy  -     Lipid Panel    4. Screening for prostate cancer  -     PSA Screen    5. Left otitis media, unspecified otitis media type  -     amoxicillin-clavulanate (AUGMENTIN) 875-125 MG per tablet; Take 1 tablet by mouth 2 (Two) Times a Day.  Dispense: 14 tablet; Refill: 0        Assessment & Plan  1. Chronic Fatigue.  - Reports extreme exhaustion and feeling unwell since February, potentially related to a COVID-19 infection.  - A1c levels are borderline; weight gain noted but not significant enough to explain symptoms.  - Comprehensive laboratory tests ordered, including  vitamin D, B12, thyroid function, cholesterol, kidney and liver function, blood counts, and PSA.  - Results of these tests will guide further management.  CONRAD is still a consideration.      2. Otitis media - Left  - Previously treated with Keflex and ear drops for an ear infection in February.  - Left eardrum appears slightly irritated.  - Prescription for Augmentin provided for a duration of 7 days    Follow up:  No follow-ups on file.     Patient or patient representative verbalized consent for the use of Ambient Listening during the visit with  Matti Rivera DO for chart documentation. 4/16/2025  13:08 EDT  Answers submitted by the patient for this visit:  Problem not listed (Submitted on 4/16/2025)  Chief Complaint: Other medical problem  Reason for appointment: Exhaustion with out cause  anorexia: Yes  joint pain: No  change in stool: No  joint swelling: No  swollen glands: No  vertigo: No  visual change: No  Onset: 1 to 6 months  Chronicity: new  Frequency: constantly  Medications tried: None

## 2025-04-17 ENCOUNTER — LAB (OUTPATIENT)
Dept: LAB | Facility: HOSPITAL | Age: 55
End: 2025-04-17
Payer: COMMERCIAL

## 2025-04-17 LAB
25(OH)D3 SERPL-MCNC: 23.6 NG/ML (ref 30–100)
ALBUMIN SERPL-MCNC: 4.5 G/DL (ref 3.5–5.2)
ALBUMIN/GLOB SERPL: 1.9 G/DL
ALP SERPL-CCNC: 68 U/L (ref 39–117)
ALT SERPL W P-5'-P-CCNC: 57 U/L (ref 1–41)
ANION GAP SERPL CALCULATED.3IONS-SCNC: 11.6 MMOL/L (ref 5–15)
AST SERPL-CCNC: 41 U/L (ref 1–40)
BASOPHILS # BLD AUTO: 0.03 10*3/MM3 (ref 0–0.2)
BASOPHILS NFR BLD AUTO: 0.5 % (ref 0–1.5)
BILIRUB SERPL-MCNC: 0.6 MG/DL (ref 0–1.2)
BUN SERPL-MCNC: 9 MG/DL (ref 6–20)
BUN/CREAT SERPL: 7.9 (ref 7–25)
CALCIUM SPEC-SCNC: 9.1 MG/DL (ref 8.6–10.5)
CHLORIDE SERPL-SCNC: 102 MMOL/L (ref 98–107)
CHOLEST SERPL-MCNC: 239 MG/DL (ref 0–200)
CO2 SERPL-SCNC: 23.4 MMOL/L (ref 22–29)
CREAT SERPL-MCNC: 1.14 MG/DL (ref 0.76–1.27)
DEPRECATED RDW RBC AUTO: 41.9 FL (ref 37–54)
EGFRCR SERPLBLD CKD-EPI 2021: 76.9 ML/MIN/1.73
EOSINOPHIL # BLD AUTO: 0.09 10*3/MM3 (ref 0–0.4)
EOSINOPHIL NFR BLD AUTO: 1.5 % (ref 0.3–6.2)
ERYTHROCYTE [DISTWIDTH] IN BLOOD BY AUTOMATED COUNT: 12.8 % (ref 12.3–15.4)
GLOBULIN UR ELPH-MCNC: 2.4 GM/DL
GLUCOSE SERPL-MCNC: 126 MG/DL (ref 65–99)
HCT VFR BLD AUTO: 52 % (ref 37.5–51)
HDLC SERPL-MCNC: 30 MG/DL (ref 40–60)
HGB BLD-MCNC: 17.3 G/DL (ref 13–17.7)
IMM GRANULOCYTES # BLD AUTO: 0.03 10*3/MM3 (ref 0–0.05)
IMM GRANULOCYTES NFR BLD AUTO: 0.5 % (ref 0–0.5)
LDLC SERPL CALC-MCNC: 180 MG/DL (ref 0–100)
LDLC/HDLC SERPL: 5.93 {RATIO}
LYMPHOCYTES # BLD AUTO: 1.59 10*3/MM3 (ref 0.7–3.1)
LYMPHOCYTES NFR BLD AUTO: 26.2 % (ref 19.6–45.3)
MCH RBC QN AUTO: 30.3 PG (ref 26.6–33)
MCHC RBC AUTO-ENTMCNC: 33.3 G/DL (ref 31.5–35.7)
MCV RBC AUTO: 91.1 FL (ref 79–97)
MONOCYTES # BLD AUTO: 0.56 10*3/MM3 (ref 0.1–0.9)
MONOCYTES NFR BLD AUTO: 9.2 % (ref 5–12)
NEUTROPHILS NFR BLD AUTO: 3.78 10*3/MM3 (ref 1.7–7)
NEUTROPHILS NFR BLD AUTO: 62.1 % (ref 42.7–76)
NRBC BLD AUTO-RTO: 0 /100 WBC (ref 0–0.2)
PLATELET # BLD AUTO: 199 10*3/MM3 (ref 140–450)
PMV BLD AUTO: 11.2 FL (ref 6–12)
POTASSIUM SERPL-SCNC: 4.5 MMOL/L (ref 3.5–5.2)
PROT SERPL-MCNC: 6.9 G/DL (ref 6–8.5)
PSA SERPL-MCNC: 0.35 NG/ML (ref 0–4)
RBC # BLD AUTO: 5.71 10*6/MM3 (ref 4.14–5.8)
SODIUM SERPL-SCNC: 137 MMOL/L (ref 136–145)
TRIGL SERPL-MCNC: 156 MG/DL (ref 0–150)
TSH SERPL DL<=0.05 MIU/L-ACNC: 2.56 UIU/ML (ref 0.27–4.2)
VIT B12 BLD-MCNC: 341 PG/ML (ref 211–946)
VLDLC SERPL-MCNC: 29 MG/DL (ref 5–40)
WBC NRBC COR # BLD AUTO: 6.08 10*3/MM3 (ref 3.4–10.8)

## 2025-04-17 PROCEDURE — 80061 LIPID PANEL: CPT | Performed by: INTERNAL MEDICINE

## 2025-04-17 PROCEDURE — 80050 GENERAL HEALTH PANEL: CPT | Performed by: INTERNAL MEDICINE

## 2025-04-17 PROCEDURE — 36415 COLL VENOUS BLD VENIPUNCTURE: CPT | Performed by: INTERNAL MEDICINE

## 2025-04-17 PROCEDURE — 82306 VITAMIN D 25 HYDROXY: CPT | Performed by: INTERNAL MEDICINE

## 2025-04-17 PROCEDURE — G0103 PSA SCREENING: HCPCS | Performed by: INTERNAL MEDICINE

## 2025-04-17 PROCEDURE — 82607 VITAMIN B-12: CPT | Performed by: INTERNAL MEDICINE

## 2025-04-30 ENCOUNTER — OFFICE VISIT (OUTPATIENT)
Dept: INTERNAL MEDICINE | Facility: CLINIC | Age: 55
End: 2025-04-30
Payer: COMMERCIAL

## 2025-04-30 VITALS
HEIGHT: 72 IN | WEIGHT: 303 LBS | RESPIRATION RATE: 18 BRPM | HEART RATE: 73 BPM | TEMPERATURE: 97.2 F | SYSTOLIC BLOOD PRESSURE: 138 MMHG | BODY MASS INDEX: 41.04 KG/M2 | OXYGEN SATURATION: 98 % | DIASTOLIC BLOOD PRESSURE: 85 MMHG

## 2025-04-30 DIAGNOSIS — D35.2 PROLACTINOMA: Primary | ICD-10-CM

## 2025-04-30 DIAGNOSIS — E31.20 MEN (MULTIPLE ENDOCRINE NEOPLASIA): ICD-10-CM

## 2025-04-30 DIAGNOSIS — Z80.0 FAMILY HISTORY OF PANCREATIC CANCER: ICD-10-CM

## 2025-04-30 DIAGNOSIS — K76.0 FATTY LIVER DISEASE, NONALCOHOLIC: ICD-10-CM

## 2025-04-30 DIAGNOSIS — E78.2 MIXED HYPERLIPIDEMIA: ICD-10-CM

## 2025-04-30 RX ORDER — ROSUVASTATIN CALCIUM 5 MG/1
5 TABLET, COATED ORAL DAILY
Qty: 90 TABLET | Refills: 3 | Status: SHIPPED | OUTPATIENT
Start: 2025-04-30

## 2025-04-30 NOTE — PROGRESS NOTES
Chief Complaint   Patient presents with    Abnormal Lab     FOLLOW UP ON LAB WORK        Subjective     History of Present Illness  The patient presents for a follow-up visit regarding abnormal labs.    A slight improvement in his condition is reported since the last visit, during which an antibiotic was prescribed. Ear-related issues and fatigue are noted, but no sinus congestion is present. A re-evaluation of the ear is requested to ensure resolution of the issue.    Currently, he is on a weekly regimen of cabergoline for a pituitary tumor, administered every Saturday. An unusual headache was experienced the following day after taking the medication, identified as a potential side effect of the drug. The headache is described as deep and non-sinus related. No scans have been performed since the initiation of this treatment. Prolactin levels are monitored biannually and have consistently remained within the normal range. An upward trend in blood pressure has been observed since starting the cabergoline treatment. He has also started taking [employer] vitamins 3 weeks ago and reports no use of biotin supplements.    Dietary habits are acknowledged to be inconsistent. Previous use of Wegovy resulted in weight loss, but it was discontinued due to a family history of pancreatic cancer. Interest in genetic testing for MEN2 is expressed. A regular exercise routine is maintained, attending the gym 4 days a week.    SOCIAL HISTORY  He does not drink alcohol or smoke.    FAMILY HISTORY  His sister passed away from pancreatic cancer. He also had an uncle who passed away from pancreatic cancer. His mother has always had a cholesterol problem and is very careful with her diet.    The following portions of the patient's history were reviewed and updated as appropriate: allergies, current medications, past family history, past medical history, past social history, past surgical history and problem list.    Review of Systems    Constitutional:  Positive for fatigue. Negative for chills, diaphoresis and fever.   HENT:  Negative for congestion and sore throat.    Respiratory:  Negative for cough.    Cardiovascular:  Negative for chest pain.   Gastrointestinal:  Negative for abdominal pain, nausea and vomiting.   Genitourinary:  Negative for dysuria.   Musculoskeletal:  Negative for myalgias and neck pain.   Skin:  Negative for rash.   Neurological:  Positive for headaches. Negative for weakness and numbness.       No Known Allergies    Past Medical History:   Diagnosis Date    Allergic rhinitis     BMI 38.0-38.9,adult     Diabetes mellitus     borderline    Diverticulosis of colon without hemorrhage     Dizziness     Heartburn     Hyperlipidemia     Hypopituitarism     Malaise and fatigue     Pituitary adenoma     Polyuria     PONV (postoperative nausea and vomiting)     Sore throat     Testosterone deficiency     Vegetarian diet     since 3 wk ago    Vitamin D deficiency 10/15/21       Social History     Socioeconomic History    Marital status:    Tobacco Use    Smoking status: Former     Current packs/day: 0.00     Average packs/day: 0.5 packs/day for 2.0 years (1.0 ttl pk-yrs)     Types: Cigarettes     Start date: 1988     Quit date: 1990     Years since quittin.3     Passive exposure: Never    Smokeless tobacco: Never    Tobacco comments:     Was never a heavy smoker   Vaping Use    Vaping status: Never Used   Substance and Sexual Activity    Alcohol use: No    Drug use: No    Sexual activity: Defer        Past Surgical History:   Procedure Laterality Date    APPENDECTOMY      CHOLECYSTECTOMY      COLONOSCOPY      complete     COLONOSCOPY N/A 2020    Procedure: COLONOSCOPY;  Surgeon: Ruddy Lara MD;  Location: [facility] ENDOSCOPY;  Service: Gastroenterology    ELBOW PROCEDURE         Family History   Problem Relation Age of Onset    Depression Mother     Cancer Maternal Grandmother     Melanoma Maternal  "Grandmother     Parkinsonism Maternal Grandfather     Diabetes Maternal Aunt     Alcohol abuse Maternal Uncle     Liver disease Maternal Uncle     Colon cancer Neg Hx     Cirrhosis Neg Hx     Liver cancer Neg Hx          Current Outpatient Medications:     amitriptyline (ELAVIL) 10 MG tablet, Take 1 tablet by mouth At Night As Needed for Sleep., Disp: 90 tablet, Rfl: 3    cabergoline (DOSTINEX) 0.5 MG tablet, Take 0.5 tablets by mouth Every 7 (Seven) Days., Disp: 8 tablet, Rfl: 1    cholecalciferol (VITAMIN D3) 25 MCG (1000 UT) tablet, Take 1 tablet by mouth Daily., Disp: , Rfl:     Needle, Disp, (Hypodermic Needle) 22G X 1\" misc, Use for testosterone injection, Disp: 6 each, Rfl: 3    Needle, Disp, (MONOJECT HYPO 18GX1\") 18G X 1\" misc, Use for testosterone injection, Disp: 6 each, Rfl: 3    Testosterone Cypionate (DEPOTESTOTERONE CYPIONATE) 200 MG/ML injection, Inject 0.7 mL into the appropriate muscle as directed by prescriber Every 14 (Fourteen) Days. Discard remainder of vial after each dose., Disp: 13 mL, Rfl: 1    rosuvastatin (Crestor) 5 MG tablet, Take 1 tablet by mouth Daily., Disp: 90 tablet, Rfl: 3    Objective   /85   Pulse 73   Temp 97.2 °F (36.2 °C) (Temporal)   Resp 18   Ht 182.9 cm (72.01\")   Wt (!) 137 kg (303 lb)   SpO2 98%   BMI 41.09 kg/m²     Physical Exam  Vitals and nursing note reviewed.   Constitutional:       Appearance: Normal appearance. He is well-developed. He is obese.   HENT:      Head: Normocephalic and atraumatic.   Eyes:      Extraocular Movements: Extraocular movements intact.      Conjunctiva/sclera: Conjunctivae normal.   Pulmonary:      Effort: Pulmonary effort is normal.   Musculoskeletal:      Cervical back: Normal range of motion and neck supple.   Skin:     General: Skin is warm and dry.      Findings: No rash.   Neurological:      General: No focal deficit present.      Mental Status: He is alert and oriented to person, place, and time.   Psychiatric:         " Mood and Affect: Mood normal.         Behavior: Behavior normal.         Results:  Results for orders placed or performed in visit on 04/16/25   Comprehensive Metabolic Panel    Collection Time: 04/17/25  9:29 AM    Specimen: Blood   Result Value Ref Range    Glucose 126 (H) 65 - 99 mg/dL    BUN 9 6 - 20 mg/dL    Creatinine 1.14 0.76 - 1.27 mg/dL    Sodium 137 136 - 145 mmol/L    Potassium 4.5 3.5 - 5.2 mmol/L    Chloride 102 98 - 107 mmol/L    CO2 23.4 22.0 - 29.0 mmol/L    Calcium 9.1 8.6 - 10.5 mg/dL    Total Protein 6.9 6.0 - 8.5 g/dL    Albumin 4.5 3.5 - 5.2 g/dL    ALT (SGPT) 57 (H) 1 - 41 U/L    AST (SGOT) 41 (H) 1 - 40 U/L    Alkaline Phosphatase 68 39 - 117 U/L    Total Bilirubin 0.6 0.0 - 1.2 mg/dL    Globulin 2.4 gm/dL    A/G Ratio 1.9 g/dL    BUN/Creatinine Ratio 7.9 7.0 - 25.0    Anion Gap 11.6 5.0 - 15.0 mmol/L    eGFR 76.9 >60.0 mL/min/1.73   TSH    Collection Time: 04/17/25  9:29 AM    Specimen: Blood   Result Value Ref Range    TSH 2.560 0.270 - 4.200 uIU/mL   Vitamin B12    Collection Time: 04/17/25  9:29 AM    Specimen: Blood   Result Value Ref Range    Vitamin B-12 341 211 - 946 pg/mL   Vitamin D,25-Hydroxy    Collection Time: 04/17/25  9:29 AM    Specimen: Blood   Result Value Ref Range    25 Hydroxy, Vitamin D 23.6 (L) 30.0 - 100.0 ng/ml   Lipid Panel    Collection Time: 04/17/25  9:29 AM    Specimen: Blood   Result Value Ref Range    Total Cholesterol 239 (H) 0 - 200 mg/dL    Triglycerides 156 (H) 0 - 150 mg/dL    HDL Cholesterol 30 (L) 40 - 60 mg/dL    LDL Cholesterol  180 (H) 0 - 100 mg/dL    VLDL Cholesterol 29 5 - 40 mg/dL    LDL/HDL Ratio 5.93    PSA Screen    Collection Time: 04/17/25  9:29 AM    Specimen: Blood   Result Value Ref Range    PSA 0.354 0.000 - 4.000 ng/mL   CBC Auto Differential    Collection Time: 04/17/25  9:29 AM    Specimen: Blood   Result Value Ref Range    WBC 6.08 3.40 - 10.80 10*3/mm3    RBC 5.71 4.14 - 5.80 10*6/mm3    Hemoglobin 17.3 13.0 - 17.7 g/dL    Hematocrit  52.0 (H) 37.5 - 51.0 %    MCV 91.1 79.0 - 97.0 fL    MCH 30.3 26.6 - 33.0 pg    MCHC 33.3 31.5 - 35.7 g/dL    RDW 12.8 12.3 - 15.4 %    RDW-SD 41.9 37.0 - 54.0 fl    MPV 11.2 6.0 - 12.0 fL    Platelets 199 140 - 450 10*3/mm3    Neutrophil % 62.1 42.7 - 76.0 %    Lymphocyte % 26.2 19.6 - 45.3 %    Monocyte % 9.2 5.0 - 12.0 %    Eosinophil % 1.5 0.3 - 6.2 %    Basophil % 0.5 0.0 - 1.5 %    Immature Grans % 0.5 0.0 - 0.5 %    Neutrophils, Absolute 3.78 1.70 - 7.00 10*3/mm3    Lymphocytes, Absolute 1.59 0.70 - 3.10 10*3/mm3    Monocytes, Absolute 0.56 0.10 - 0.90 10*3/mm3    Eosinophils, Absolute 0.09 0.00 - 0.40 10*3/mm3    Basophils, Absolute 0.03 0.00 - 0.20 10*3/mm3    Immature Grans, Absolute 0.03 0.00 - 0.05 10*3/mm3    nRBC 0.0 0.0 - 0.2 /100 WBC         Assessment & Plan   Diagnoses and all orders for this visit:    1. Prolactinoma (Primary)  -     Prolactin  -     US Abdomen Complete  -     Ambulatory Referral to Genetic Counseling/Testing    2. Family history of pancreatic cancer  -     US Abdomen Complete  -     Ambulatory Referral to Genetic Counseling/Testing    3. Fatty liver disease, nonalcoholic  -     US Abdomen Complete  -     ORTIZ Fibrosure    4. Mixed hyperlipidemia    5. MEN (multiple endocrine neoplasia)  -     Ambulatory Referral to Genetic Counseling/Testing    Other orders  -     rosuvastatin (Crestor) 5 MG tablet; Take 1 tablet by mouth Daily.  Dispense: 90 tablet; Refill: 3        Assessment & Plan  1. Abnormal laboratory results / Chronic Fatigue / Fatty Liver disease   - A1c level is currently at 6.0, which falls within the borderline range. Blood glucose level from the [facility] is consistent with this reading, suggesting a potential upward trend in sugar levels.  - AST and ALT levels are elevated, indicative of early signs of fatty liver disease. Cholesterol levels are also high.  - Advised to maintain a healthy diet and regular exercise regimen. Advised to avoid biotin for a week prior to any  blood work as it can affect the reading of thyroid and TSH.  - Prolactin level test will be ordered. An abdominal ultrasound will be ordered to assess for fatty liver disease and any potential pancreatic abnormalities. A low-dose statin will be initiated to manage cholesterol levels.    2. Pituitary microadenoma.  - Currently on cabergoline for a pituitary microadenoma, with the most recent scan showing a decrease from 5 mm to 2 mm.  - Reports experiencing headaches, which may be a side effect of the medication.  - Advised to hold off on taking vitamins for a week before the prolactin level test. Ok to stop cabergoline until follow up with endocrinology  - Prolactin levels have been consistently on the lower side of normal.    3. Family history of pancreatic cancer / thyroid disease  - Given family history of pancreatic cancer, genetic testing for MEN1/MEN2 needs consideration.  - Referral to a  will be made for further evaluation and testing.  - use of SGL2 medications may be considered based on genetics recommendations.         51 min spent with direct patient care, review of medical chart, discussion with patient, and medical decision making.   Follow up:  Return in about 3 months (around 7/30/2025).     Patient or patient representative verbalized consent for the use of Ambient Listening during the visit with  Matti Rivera DO for chart documentation. 4/30/2025  12:50 EDT  Answers submitted by the patient for this visit:  Problem not listed (Submitted on 4/30/2025)  Chief Complaint: Other medical problem  anorexia: No  joint pain: No  change in stool: No  joint swelling: No  swollen glands: No  vertigo: No  visual change: No  Onset: 1 to 6 months  Chronicity: new  Frequency: constantly

## 2025-05-13 ENCOUNTER — LAB (OUTPATIENT)
Dept: LAB | Facility: HOSPITAL | Age: 55
End: 2025-05-13
Payer: COMMERCIAL

## 2025-05-13 LAB — PROLACTIN SERPL-MCNC: 8.41 NG/ML (ref 4.04–15.2)

## 2025-05-13 PROCEDURE — 82172 ASSAY OF APOLIPOPROTEIN: CPT | Performed by: INTERNAL MEDICINE

## 2025-05-13 PROCEDURE — 84478 ASSAY OF TRIGLYCERIDES: CPT | Performed by: INTERNAL MEDICINE

## 2025-05-13 PROCEDURE — 83883 ASSAY NEPHELOMETRY NOT SPEC: CPT | Performed by: INTERNAL MEDICINE

## 2025-05-13 PROCEDURE — 84450 TRANSFERASE (AST) (SGOT): CPT | Performed by: INTERNAL MEDICINE

## 2025-05-13 PROCEDURE — 82247 BILIRUBIN TOTAL: CPT | Performed by: INTERNAL MEDICINE

## 2025-05-13 PROCEDURE — 84460 ALANINE AMINO (ALT) (SGPT): CPT | Performed by: INTERNAL MEDICINE

## 2025-05-13 PROCEDURE — 82465 ASSAY BLD/SERUM CHOLESTEROL: CPT | Performed by: INTERNAL MEDICINE

## 2025-05-13 PROCEDURE — 84146 ASSAY OF PROLACTIN: CPT | Performed by: INTERNAL MEDICINE

## 2025-05-13 PROCEDURE — 83010 ASSAY OF HAPTOGLOBIN QUANT: CPT | Performed by: INTERNAL MEDICINE

## 2025-05-13 PROCEDURE — 82947 ASSAY GLUCOSE BLOOD QUANT: CPT | Performed by: INTERNAL MEDICINE

## 2025-05-13 PROCEDURE — 82977 ASSAY OF GGT: CPT | Performed by: INTERNAL MEDICINE

## 2025-05-14 ENCOUNTER — TELEPHONE (OUTPATIENT)
Dept: INTERNAL MEDICINE | Facility: CLINIC | Age: 55
End: 2025-05-14
Payer: COMMERCIAL

## 2025-05-14 NOTE — TELEPHONE ENCOUNTER
"Prolactin level is in normal range although slightly higher than prior values. We can continue to monitor while he is off the cabergoline.     Called patient. Reached voicemail. Left detailed message for return call to discuss.            Relay     \"Prolactin level is in normal range although slightly higher than prior values. We can continue to monitor while he is off the cabergoline. \"                  "

## 2025-05-15 LAB
A2 MACROGLOB SERPL-MCNC: 268 MG/DL (ref 110–276)
ALT SERPL W P-5'-P-CCNC: 63 IU/L (ref 0–55)
APO A-I SERPL-MCNC: 84 MG/DL (ref 101–178)
AST SERPL W P-5'-P-CCNC: 41 IU/L (ref 0–40)
BILIRUB SERPL-MCNC: 0.6 MG/DL (ref 0–1.2)
CHOLEST SERPL-MCNC: 159 MG/DL (ref 100–199)
FIBROSIS SCORING:: ABNORMAL
FIBROSIS STAGE SERPL QL: ABNORMAL
GGT SERPL-CCNC: 16 IU/L (ref 0–65)
GLUCOSE SERPL-MCNC: 111 MG/DL (ref 70–99)
HAPTOGLOB SERPL-MCNC: 124 MG/DL (ref 29–370)
LABORATORY COMMENT REPORT: ABNORMAL
LIVER FIBR SCORE SERPL CALC.FIBROSURE: 0.51 (ref 0–0.21)
LIVER STEATOSIS GRADE SERPL QL: ABNORMAL
LIVER STEATOSIS SCORE SERPL: 0.47 (ref 0–0.4)
NASH GRADE SERPL QL: ABNORMAL
NASH INTERPRETATION SERPL-IMP: ABNORMAL
NASH SCORE SERPL: 0.65 (ref 0–0.25)
NASH SCORING: ABNORMAL
STEATOSIS SCORING: ABNORMAL
TEST PERFORMANCE INFO SPEC: ABNORMAL
TEST PERFORMANCE INFO SPEC: ABNORMAL
TRIGL SERPL-MCNC: 93 MG/DL (ref 0–149)

## 2025-06-10 ENCOUNTER — HOSPITAL ENCOUNTER (OUTPATIENT)
Facility: HOSPITAL | Age: 55
Discharge: HOME | End: 2025-06-10
Admitting: INTERNAL MEDICINE
Payer: COMMERCIAL

## 2025-06-10 PROCEDURE — 76700 US EXAM ABDOM COMPLETE: CPT

## 2025-06-13 DIAGNOSIS — K76.0 FATTY LIVER DISEASE, NONALCOHOLIC: Primary | ICD-10-CM

## 2025-06-19 ENCOUNTER — OFFICE VISIT (OUTPATIENT)
Dept: GASTROENTEROLOGY | Facility: CLINIC | Age: 55
End: 2025-06-19
Payer: COMMERCIAL

## 2025-06-19 VITALS
OXYGEN SATURATION: 98 % | SYSTOLIC BLOOD PRESSURE: 140 MMHG | WEIGHT: 293 LBS | DIASTOLIC BLOOD PRESSURE: 98 MMHG | BODY MASS INDEX: 39.73 KG/M2 | HEART RATE: 78 BPM

## 2025-06-19 DIAGNOSIS — K75.81 METABOLIC DYSFUNCTION-ASSOCIATED STEATOHEPATITIS (MASH): Primary | ICD-10-CM

## 2025-06-19 DIAGNOSIS — K74.00 LIVER FIBROSIS: ICD-10-CM

## 2025-06-19 DIAGNOSIS — R16.1 SPLENOMEGALY: ICD-10-CM

## 2025-06-19 DIAGNOSIS — Z86.0101 HISTORY OF ADENOMATOUS POLYP OF COLON: ICD-10-CM

## 2025-06-19 DIAGNOSIS — Z87.19 HISTORY OF DIVERTICULITIS OF COLON: ICD-10-CM

## 2025-06-19 RX ORDER — SODIUM CHLORIDE 9 MG/ML
30 INJECTION, SOLUTION INTRAVENOUS CONTINUOUS PRN
OUTPATIENT
Start: 2025-06-19 | End: 2025-06-20

## 2025-06-19 NOTE — PROGRESS NOTES
New Patient Consult      Date: 2025   Patient Name: Harish Anaya  MRN: [id number]  : 1970     Primary Care Provider: Matti Rivera DO  Referring Provider: Nicole    Chief Complaint   Patient presents with    Diverticulitis    Elevated Hepatic Enzymes     History of Present Illness: Harish Anaya is a 55 y.o. male who is here today as a consultation with Gastroenterology for evaluation of Diverticulitis and Elevated Hepatic Enzymes.    1 week ago, he reports had the suprapubic pain that he has had in the past, thinks related to diverticulitis. This time, flared with an episode of constipation during. BMs occurring daily now, still some lower abdominal pain while having a stool.No antibiotics this time, has taken in the past, he took ibuprofen which helped and had liquid diet. No rectal bleeding. Last colonoscopy was in  with Dr. Lara.     Has fatty liver. Was told after recent US abd that he also has splenomegaly. Has had mildly elevated A1c, elevated cholesterol. He has not been taking any cholesterol medication regularly, prefers to change his diet. Overall, reports a healthy diet. Was on ozempic in the past but he was worried about his pancreas while on it. He discontinued it, a couple of years ago. He lost 50+ lbs a couple of years ago. Has gained some weight back now. No history of any alcoholism. No current alcohol use.     Uncle and sister with pancreatic cancer. No significant upper GI symptoms. No nausea or vomiting. No significant reflux. No dysphagia.     Subjective      Past Medical History:   Diagnosis Date    Allergic rhinitis     BMI 38.0-38.9,adult     Diabetes mellitus     borderline    Diverticulosis of colon without hemorrhage     Dizziness     Heartburn     Hyperlipidemia     Hypopituitarism     Malaise and fatigue     Pituitary adenoma     Polyuria     PONV (postoperative nausea and vomiting)     Sore throat     Testosterone deficiency      "Vegetarian diet     since 3 wk ago    Vitamin D deficiency 10/15/21     Past Surgical History:   Procedure Laterality Date    APPENDECTOMY      CHOLECYSTECTOMY      COLONOSCOPY      complete     COLONOSCOPY N/A 2020    Procedure: COLONOSCOPY;  Surgeon: Ruddy Lara MD;  Location: [facility];  Service: Gastroenterology    ELBOW PROCEDURE       Family History   Problem Relation Age of Onset    Depression Mother     Cancer Maternal Grandmother     Melanoma Maternal Grandmother     Parkinsonism Maternal Grandfather     Diabetes Maternal Aunt     Alcohol abuse Maternal Uncle     Liver disease Maternal Uncle     Colon cancer Neg Hx     Cirrhosis Neg Hx     Liver cancer Neg Hx      Social History     Socioeconomic History    Marital status:    Tobacco Use    Smoking status: Former     Current packs/day: 0.00     Average packs/day: 0.5 packs/day for 2.0 years (1.0 ttl pk-yrs)     Types: Cigarettes     Start date: 1988     Quit date: 1990     Years since quittin.5     Passive exposure: Never    Smokeless tobacco: Never    Tobacco comments:     Was never a heavy smoker   Vaping Use    Vaping status: Never Used   Substance and Sexual Activity    Alcohol use: No    Drug use: No    Sexual activity: Defer     Current Outpatient Medications:     amitriptyline (ELAVIL) 10 MG tablet, Take 1 tablet by mouth At Night As Needed for Sleep., Disp: 90 tablet, Rfl: 3    cholecalciferol (VITAMIN D3) 25 MCG (1000 UT) tablet, Take 1 tablet by mouth Daily., Disp: , Rfl:     Needle, Disp, (Hypodermic Needle) 22G X 1\" misc, Use for testosterone injection, Disp: 6 each, Rfl: 3    Needle, Disp, (MONOJECT HYPO 18GX1\") 18G X 1\" misc, Use for testosterone injection, Disp: 6 each, Rfl: 3    Testosterone Cypionate (DEPOTESTOTERONE CYPIONATE) 200 MG/ML injection, Inject 0.7 mL into the appropriate muscle as directed by prescriber Every 14 (Fourteen) Days. Discard remainder of vial after each dose., Disp: 13 mL, Rfl: " 1    rosuvastatin (Crestor) 5 MG tablet, Take 1 tablet by mouth Daily. (Patient not taking: Reported on 6/19/2025), Disp: 90 tablet, Rfl: 3    No Known Allergies    The following portions of the patient's history were reviewed and updated as appropriate: allergies, current medications, past family history, past medical history, past social history, past surgical history and problem list.    Objective     Physical Exam  Constitutional:       General: He is not in acute distress.     Appearance: Normal appearance. He is well-developed. He is obese. He is not diaphoretic.   HENT:      Head: Normocephalic and atraumatic.      Right Ear: External ear normal.      Left Ear: External ear normal.      Nose: Nose normal.   Eyes:      General: No scleral icterus.        Right eye: No discharge.         Left eye: No discharge.      Conjunctiva/sclera: Conjunctivae normal.   Neck:      Trachea: No tracheal deviation.   Pulmonary:      Effort: Pulmonary effort is normal. No respiratory distress.   Abdominal:      General: Bowel sounds are normal. There is no distension.      Palpations: Abdomen is soft. There is no mass.      Tenderness: There is no abdominal tenderness. There is no guarding.      Hernia: No hernia is present.   Musculoskeletal:         General: Normal range of motion.      Cervical back: Normal range of motion.   Skin:     Coloration: Skin is not pale.      Findings: No erythema or rash.   Neurological:      Mental Status: He is alert and oriented to person, place, and time.      Coordination: Coordination normal.   Psychiatric:         Mood and Affect: Mood normal.         Behavior: Behavior normal.         Thought Content: Thought content normal.         Judgment: Judgment normal.       Vitals:    06/19/25 1455   BP: 140/98   Pulse: 78   SpO2: 98%   Weight: 133 kg (293 lb)     Results Review:   I have reviewed the patient's new clinical and imaging results.    Office Visit on 04/30/2025   Component Date Value  Ref Range Status    Prolactin 05/13/2025 8.41  4.04 - 15.20 ng/mL Final    Fibrosis Score 05/13/2025 0.51 (H)  0.00 - 0.21 Final    Fibrosis Stage 05/13/2025 Comment   Final                F2 - Bridging fibrosis with few septa    Steatosis Score (Reference) 05/13/2025 0.47 (H)  0.00 - 0.40 Final    Steatosis Grade (Reference) 05/13/2025 Comment   Final                    S1 - Mild Steatosis                       (But Clinically Significant) (5-33%)    ORTIZ Score (Reference) 05/13/2025 0.65 (H)  0.00 - 0.25 Final    Ortiz Grade (Reference) 05/13/2025 Comment   Final                       N2 - Moderate ORTIZ    Methodology: 05/13/2025 Comment   Final    The analytes tested are performed by FibroSure-Specific methods.  Not intended for use with other diagnostic considerations.    Alpha 2-Macroglobulins, Qn 05/13/2025 268  110 - 276 mg/dL Final    Haptoglobin 05/13/2025 124  29 - 370 mg/dL Final    Apolipoprotein A-1 05/13/2025 84 (L)  101 - 178 mg/dL Final    Total Bilirubin 05/13/2025 0.6  0.0 - 1.2 mg/dL Final    GGT 05/13/2025 16  0 - 65 IU/L Final    ALT (SGPT) 05/13/2025 63 (H)  0 - 55 IU/L Final    AST (SGOT) P5P (Reference) 05/13/2025 41 (H)  0 - 40 IU/L Final    Cholesterol, Total (Reference) 05/13/2025 159  100 - 199 mg/dL Final    Glucose, Serum (Reference) 05/13/2025 111 (H)  70 - 99 mg/dL Final    Triglycerides 05/13/2025 93  0 - 149 mg/dL Final    Interpretations: (Reference) 05/13/2025 Comment   Final    Comment: Quantitative results of 10 biochemicals in combination with age and  gender, are analyzed using a computational algorithm to provide a  quantitative surrogate marker (0.0-1.0) of liver fibrosis (Metavir  F0-F4), hepatic steatosis (0.0-1.0, S0-S3), and Non-Alcoholic  Steato-Hepatitis (ORTIZ) (0.0-1.0, N0-N3), now known as Metabolic  Dysfunction-Associated Steatohepatitis (MASH). The absence of  steatosis (S<0.40) precludes the diagnosis of ORTIZ/MASH.  Fibrosis marker: In a study of 171 Non-Alcoholic  Fatty Liver Disease  (NAFLD), now known as Metabolic Dysfunction-Associated Steatotic  Liver Disease (MASLD), patients where 23% had significant NAFLD/MASLD  fibrosis (Metavir F2-F4) and 11% had cirrhosis by liver biopsy, a  fibrosis result of >0.3 yielded a sensitivity of 83% and a  specificity of 78% for the detection of significant fibrosis.[1]  Steatosis marker: In a population of 2997 patients, where 61% had  significant steatosis (>=5%) on a liver biopsy, a steatosis score  >0.4 had a                            sensitivity of 79% and a specificity of 50% for  identification of significant steatosis.[2]  ORTIZ/MASH marker: In a population of 1081 NAFLD/MASLD patients, where  51% had at least some ORTIZ/MASH by liver biopsy, a prediction of  ORTIZ/MASH had a sensitivity of 72% for identifying ORTIZ/MASH and a  specificity of 71%.[3]    Fibrosis Scorin2025 Comment   Final         <=0.21 = Stage F0 - No fibrosis  0.21 - 0.27 = Stage F0 - F1  0.27 - 0.31 = Stage F1 - Portal fibrosis  0.31 - 0.48 = Stage F1 - F2  0.48 - 0.58 = Stage F2 - Bridging fibrosis with few septa  0.58 - 0.72 = Stage F3 - Bridging fibrosis with many septa  0.72 - 0.74 = Stage F3 - F4        >0.74 = Stage F4 - Cirrhosis    Steatosis Scoring 2025 Comment   Final         <=0.40 = S0  - No Steatosis (<5%)  0.40 - 0.55 = S1  - Mild Steatosis                      (but Clinically Significant) (5-33%)        >0.55 = S2S3- Moderate to Severe Steatosis                      (Clinically Significant) (%)    ORTIZ Scoring 2025 Comment   Final         <=0.25 = N0 - No ORTIZ/MASH  0.25 - 0.50 = N1 - Mild ORTIZ/MASH  0.50 - 0.75 = N2 - Moderate ORTIZ/MASH        >0.75 = N3 - Severe ORTIZ/MASH    Limitations: 2025 Comment   Final    ORTIZ FibroSure(R) Plus is recommended for patients with suspected  non-alcoholic fatty liver disease, now known as Metabolic  Dysfunction-Associated Steatotic Liver Disease or MASLD.  It is not recommended  for patients with other liver diseases.  It is also not recommended in patients with Gilbert Disease,  acute hemolysis, acute viral hepatitis, drug induced hepatitis,  genetic liver disease, autoimmune hepatitis and/or extra-hepatic  cholestasis. Any of these clinical situations may lead to  inaccurate quantitative predictions of fibrosis.    Comment 05/13/2025 Comment   Final    This test was developed and its performance characteristics  determined by [employer]. It has not been cleared or approved  by the Food and Drug Administration.  For questions regarding this report please contact customer service  at [phone].  References:  1.  Agata ESPARZA et al. Diagnostic Value of Biochemical Markers  (FibroTest) for the prediction of Liver Fibrosis in patients with  Non-Alcoholic Fatty Liver Disease. BMC Gastroenterology 2006; 6:6.  2.  Dejah PATEL. et al. The Diagnostic Performance of a Simplified  Blood Test (SteatoTest-2) for the Prediction of Liver Steatosis.  Eur J Gastroenterol Hepatol. 2019; 31:393-402.  3.  Dejah PATEL. et al. Diagnostic performance of a new noninvasive  test for nonalcoholic steatohepatitis using a simplified histological  reference. Eur J Gastroenterol Hepatol. 2018 May; 30:569-577.   Office Visit on 04/16/2025   Component Date Value Ref Range Status    Glucose 04/17/2025 126 (H)  65 - 99 mg/dL Final    BUN 04/17/2025 9  6 - 20 mg/dL Final    Creatinine 04/17/2025 1.14  0.76 - 1.27 mg/dL Final    Sodium 04/17/2025 137  136 - 145 mmol/L Final    Potassium 04/17/2025 4.5  3.5 - 5.2 mmol/L Final    Slight hemolysis detected by analyzer. Result may be falsely elevated.    Chloride 04/17/2025 102  98 - 107 mmol/L Final    CO2 04/17/2025 23.4  22.0 - 29.0 mmol/L Final    Calcium 04/17/2025 9.1  8.6 - 10.5 mg/dL Final    Total Protein 04/17/2025 6.9  6.0 - 8.5 g/dL Final    Albumin 04/17/2025 4.5  3.5 - 5.2 g/dL Final    ALT (SGPT) 04/17/2025 57 (H)  1 - 41 U/L Final    AST (SGOT) 04/17/2025 41 (H)  1 -  40 U/L Final    Alkaline Phosphatase 04/17/2025 68  39 - 117 U/L Final    Total Bilirubin 04/17/2025 0.6  0.0 - 1.2 mg/dL Final    Globulin 04/17/2025 2.4  gm/dL Final    A/G Ratio 04/17/2025 1.9  g/dL Final    BUN/Creatinine Ratio 04/17/2025 7.9  7.0 - 25.0 Final    Anion Gap 04/17/2025 11.6  5.0 - 15.0 mmol/L Final    eGFR 04/17/2025 76.9  >60.0 mL/min/1.73 Final    TSH 04/17/2025 2.560  0.270 - 4.200 uIU/mL Final    Vitamin B-12 04/17/2025 341  211 - 946 pg/mL Final    25 Hydroxy, Vitamin D 04/17/2025 23.6 (L)  30.0 - 100.0 ng/ml Final    Total Cholesterol 04/17/2025 239 (H)  0 - 200 mg/dL Final    Triglycerides 04/17/2025 156 (H)  0 - 150 mg/dL Final    HDL Cholesterol 04/17/2025 30 (L)  40 - 60 mg/dL Final    LDL Cholesterol  04/17/2025 180 (H)  0 - 100 mg/dL Final    VLDL Cholesterol 04/17/2025 29  5 - 40 mg/dL Final    LDL/HDL Ratio 04/17/2025 5.93   Final    PSA 04/17/2025 0.354  0.000 - 4.000 ng/mL Final    WBC 04/17/2025 6.08  3.40 - 10.80 10*3/mm3 Final    RBC 04/17/2025 5.71  4.14 - 5.80 10*6/mm3 Final    Hemoglobin 04/17/2025 17.3  13.0 - 17.7 g/dL Final    Hematocrit 04/17/2025 52.0 (H)  37.5 - 51.0 % Final    MCV 04/17/2025 91.1  79.0 - 97.0 fL Final    MCH 04/17/2025 30.3  26.6 - 33.0 pg Final    MCHC 04/17/2025 33.3  31.5 - 35.7 g/dL Final    RDW 04/17/2025 12.8  12.3 - 15.4 % Final    RDW-SD 04/17/2025 41.9  37.0 - 54.0 fl Final    MPV 04/17/2025 11.2  6.0 - 12.0 fL Final    Platelets 04/17/2025 199  140 - 450 10*3/mm3 Final    Neutrophil % 04/17/2025 62.1  42.7 - 76.0 % Final    Lymphocyte % 04/17/2025 26.2  19.6 - 45.3 % Final    Monocyte % 04/17/2025 9.2  5.0 - 12.0 % Final    Eosinophil % 04/17/2025 1.5  0.3 - 6.2 % Final    Basophil % 04/17/2025 0.5  0.0 - 1.5 % Final    Immature Grans % 04/17/2025 0.5  0.0 - 0.5 % Final    Neutrophils, Absolute 04/17/2025 3.78  1.70 - 7.00 10*3/mm3 Final    Lymphocytes, Absolute 04/17/2025 1.59  0.70 - 3.10 10*3/mm3 Final    Monocytes, Absolute 04/17/2025  0.56  0.10 - 0.90 10*3/mm3 Final    Eosinophils, Absolute 04/17/2025 0.09  0.00 - 0.40 10*3/mm3 Final    Basophils, Absolute 04/17/2025 0.03  0.00 - 0.20 10*3/mm3 Final    Immature Grans, Absolute 04/17/2025 0.03  0.00 - 0.05 10*3/mm3 Final    nRBC 04/17/2025 0.0  0.0 - 0.2 /100 WBC Final   Lab on 03/31/2025   Component Date Value Ref Range Status    Testosterone, Total 03/31/2025 888.3  264.0 - 916.0 ng/dL Final    This [facility] LC/MS-MS method is currently certified by the CDC  Hormone Standardization Program (HoSt). Adult male reference  interval is based on a population of healthy nonobese males  (BMI <30) between 19 and 39 years old. Waleska et.al. JCEM  2017,102;3234-8259. PMID: 57684198.    Testosterone, Free 03/31/2025 22.6  7.2 - 24.0 pg/mL Final    Hemoglobin A1C 03/31/2025 6.00 (H)  4.80 - 5.60 % Final      US Abdomen Complete  Result Date: 6/11/2025  Impression: 1.Hepatic steatosis. 2.Splenomegaly.      Dated May 14, 2014 the patient underwent a colonoscopy to the terminal ileum which revealed: Left-sided diverticulosis, with an area of significant erythema and swelling in the left colon consistent with underlying diverticulitis, colon polyps, inverted diverticula, rectal nodule, and internal hemorrhoids.  Mucosa was noted to be somewhat flattened within the terminal ileum. Small bowel, terminal ileum, biopsy revealed no pathologic alterations, negative for celiac disease.  Random colon biopsy revealed no significant pathologic alterations.  Sigmoid colon polyp, biopsy revealed sessile serrated adenoma's without high-grade dysplasia (x2).  Sigmoid colon polyp, biopsy revealed inflammatory polyps (x2).  Rectum, biopsy revealed mucosal lymphoid aggregates (x4).     Dated May 14, 2014 the patient underwent an upper endoscopy which revealed: Severe erosive distal esophagitis grade 4, free gastroesophageal reflux, distal esophageal diverticulum (small), early stricturing of the distal esophagus, polypoid  areas at the cardia, sliding hiatal hernia (less than 3 cm), and erythematous gastritis.  A possible underlying Pierce's cannot be excluded.  No scalloping was seen within the second portion of duodenum.  A significant amount of bile was noted within the stomach and this was suctioned.  Second portion of duodenum, biopsy revealed no pathologic alterations.  Antrum and body, biopsy revealed no pathologic alterations.  No H. pylori identified (negative CFV stain).  Stomach, cardia, biopsy revealed chronic active carditis.  No H. pylori identified (negative CFV stain, adequate control).  Negative for metaplasia, dysplasia, or malignancy.  Mid base, esophagus, left lateral, biopsy revealed reflux esophagitis.  No metaplasia identified (negative AB/PAS stain, adequate control).     Dated January 17, 2020 the patient underwent colonoscopy to terminal ileum which revealed: Pandiverticulosis.  Colon polyps.  4 were removed.  3-5 mm in size.  Internal hemorrhoids.  Verrucoid skin lesion in the perineum.  Biopsied.  Transverse colon polyp, biopsy revealed early hyperplastic polyp.  Descending colon polyp, biopsy revealed adenomatous polyp (tubular adenoma).  Negative for high-grade dysplasia.  Rectal, area of previous resection, biopsy revealed colonic type mucosa with nonspecific surface erosions.  No additional abnormalities identified.  Cecum polyp, biopsy revealed benign mucosal fold, multiple deeper levels evaluated.  Ascending colon polyp, biopsy revealed sessile serrated adenoma.  Negative for cytologic dysplasia.  Anal, polypoid area, biopsy revealed mildly polypoid squamous mucosa with no evidence of viral cytopathic change or dysplasia.  Negative for P16 (immunohistochemical stain with good control, requested by ordering clinician).    Assessment / Plan      1. Metabolic dysfunction-associated steatohepatitis (MASH)  2. Splenomegaly  3. Liver fibrosis  Recent US abd 6/2025 showed fatty liver and splenomegaly.  Nonalcoholic. BMI is 39. He lost 50+ lbs a couple of years ago but has gained some weight back now. Had labs with PCP recently including ORTIZ fibroSURE showed F2 fibrosis, SI, N2. Has had elevated liver enzymes intermittently. Labs 4/2025 with AST 41, ALT 57. TBili normal. Alk phos normal. Plt borderline decreased at 199,000. He has signs of portal hypertension with splenomegaly, borderline platelets in setting of liver fibrosis (F2).    Work on weight loss of 29 lbs in the next 6 months  Consider GLP-1 for weight loss  Long discussion regarding possible Rezdiffra tx for STEVE with fibrosis, he wants to think on this  If no medication started, will consider repeat FibroSURE lab in 6 months to 1 year  Better glucose and cholesterol control  Repeat CMP with PCP in 3-6 months    4. History of diverticulitis of colon  5. History of adenomatous polyp of colon  1 week ago, he reports had the suprapubic pain that he has had in the past, thinks related to diverticulitis. This time, flared with an episode of constipation. BMs occurring daily now, still some lower abdominal pain while having a stool. No  recent antibiotics. Symptoms improved after he took ibuprofen= and had liquid diet. No rectal bleeding. Last colonoscopy was in 2020 with Dr. Lara, has personal history of colon polyps.     Offered CTAP, declined at this time  If pain returns, should have CTAP  High fiber diet gradually after symptoms resolve  Start Miralax PRN constipation  Colonoscopy in 2-3 months     He will have a colonoscopy performed with monitored anesthesia care. The indications, technique, alternatives and potential risk and complications were discussed with the patient including but not limited to bleeding, bowel perforations, missing lesions and anesthetic complications. The patient understands and wishes to proceed with the procedure and has given their verbal consent. Written patient education information was given to the patient. He should  follow up in the office after this procedure to discuss the results and further recommendations can be made at that time. The patient will call if they have further questions before procedure.  - Case Request  - Suprep to send closer to procedure date at his request    Follow Up:   Return for follow up after procedure to discuss results.    Mireya Hahn PA-C  [facility]  6/19/2025  17:18 EDT

## 2025-06-19 NOTE — PATIENT INSTRUCTIONS
Work on weight loss, goal is to lose 29 lbs in the next 6 months      Fiber Foods  It is recommended that you consume 25-45 grams daily.    Fresh & Dried Fruit  Serving Size Fiber (g)    Apples with skin  1 medium 5.0    Apricot  3 medium 1.0    Apricots, dried  4 pieces 2.9    Banana  1 medium 3.9    Blueberries  1 cup 4.2    Cantaloupe, cubes  1 cup 1.3    Figs, dried  2 medium 3.7    Grapefruit  1/2 medium 3.1    Orange, navel  1 medium 3.4    Peach  1 medium 2.0    Peaches, dried  3 pieces 3.2    Pear  1 medium 5.1    Plum  1 medium 1.1    Raisins  1.5 oz box 1.6    Raspberries  1 cup 6.4    Strawberries  1 cup 4.4      Grains, Beans, Nuts & Seeds  Serving Size Fiber (g)    Almonds  1 oz 4.2    Black beans, cooked  1 cup 13.9    Bran cereal  1 cup 19.9    Bread, whole wheat  1 slice 2.0    Brown rice, dry  1 cup 7.9    Cashews  1 oz 1.0    Flax seeds  3 Tbsp. 6.9    Garbanzo beans, cooked  1 cup 5.8    Kidney beans, cooked  1 cup 11.6    Lentils, red cooked  1 cup 13.6    Lima beans, cooked  1 cup 8.6    Oats, rolled dry  1 cup 12.0    Quinoa (seeds) dry  1/4 cup 6.2    Quinoa, cooked  1 cup 8.4    Pasta, whole wheat  1 cup 6.3    Peanuts  1 oz 2.3    Pistachio nuts  1 oz 3.1    Pumpkin seeds  1/4 cup 4.1    Soybeans, cooked  1 cup 8.6    Sunflower seeds  1/4 cup 3.0    Walnuts  1 oz 3.1            Vegetables  Serving Size Fiber (g)    Avocado (fruit)  1 medium 11.8    Beets, cooked  1 cup 2.8    Beet greens  1 cup 4.2    Bok sharon, cooked  1 cup 2.8    Broccoli, cooked  1 cup 4.5    [place] sprouts, cooked  1 cup 3.6    Cabbage, cooked  1 cup 4.2    Carrot  1 medium 2.6    Carrot, cooked  1 cup 5.2    Cauliflower, cooked  1 cup 3.4    Vishnu slaw  1 cup 4.0    Casey greens, cooked  1 cup 2.6    Corn, sweet  1 cup 4.6    Green beans  1 cup 4.0    Celery  1 stalk 1.1    Kale, cooked  1 cup 7.2    Onions, raw  1 cup 2.9    Peas, cooked  1 cup 8.8    Peppers, sweet  1 cup 2.6    Pop corn, air-popped  3 cups 3.6     Potato, baked w/ skin  1 medium 4.8    Spinach, cooked  1 cup 4.3    Summer squash, cooked  1 cup 2.5    Sweet potato, cooked  1 medium 4.9    Swiss chard, cooked  1 cup 3.7    Tomato  1 medium 1.0    Winter squash, cooked  1 cup 6.2    Zucchini, cooked  1 cup 2.6

## 2025-06-26 ENCOUNTER — OFFICE VISIT (OUTPATIENT)
Dept: ENDOCRINOLOGY | Facility: CLINIC | Age: 55
End: 2025-06-26
Payer: COMMERCIAL

## 2025-06-26 VITALS
DIASTOLIC BLOOD PRESSURE: 80 MMHG | HEIGHT: 72 IN | SYSTOLIC BLOOD PRESSURE: 122 MMHG | BODY MASS INDEX: 39.9 KG/M2 | OXYGEN SATURATION: 94 % | HEART RATE: 57 BPM | WEIGHT: 294.6 LBS

## 2025-06-26 DIAGNOSIS — E29.1 HYPOGONADISM IN MALE: ICD-10-CM

## 2025-06-26 DIAGNOSIS — R73.03 PREDIABETES: ICD-10-CM

## 2025-06-26 DIAGNOSIS — D35.2 PROLACTINOMA: Primary | ICD-10-CM

## 2025-06-26 PROBLEM — E22.1 HYPERPROLACTINEMIA: Status: ACTIVE | Noted: 2025-06-26

## 2025-06-26 LAB
EXPIRATION DATE: NORMAL
EXPIRATION DATE: NORMAL
GLUCOSE BLDC GLUCOMTR-MCNC: 90 MG/DL (ref 70–130)
HBA1C MFR BLD: 5.5 % (ref 4.5–5.7)
Lab: NORMAL
Lab: NORMAL
PROLACTIN SERPL-MCNC: 27.7 NG/ML (ref 4.04–15.2)

## 2025-06-26 PROCEDURE — 84402 ASSAY OF FREE TESTOSTERONE: CPT | Performed by: INTERNAL MEDICINE

## 2025-06-26 PROCEDURE — 84403 ASSAY OF TOTAL TESTOSTERONE: CPT | Performed by: INTERNAL MEDICINE

## 2025-06-26 PROCEDURE — 84146 ASSAY OF PROLACTIN: CPT | Performed by: INTERNAL MEDICINE

## 2025-06-26 NOTE — ASSESSMENT & PLAN NOTE
Diagnosed 2020. Was stable on cabergoline 0.25 mg weekly with normal prolactin level. Stopped cabergoline 4/2025 after discussion with PCP due to headaches. Thankfully, headaches have improved.   Recheck level today. At risk for recurrence since stopping the med.   MRI from 8/10/2021 showed decrease in size of the pituitary adenoma, down to 2 mm.  Prolactin levels responding appropriately. Routine imaging isn't needed if prolactin levels remain stable (while being treated).   Pending referral to genetics per request to his PCP. I screened him for associated conditions for MEN 1 (due to pituitary adenoma, concern more for MEN 1, not 2) in 2023 and PTH/calcitonin was normal. Recent abd US with no noted pancreatic lesions.

## 2025-06-26 NOTE — ASSESSMENT & PLAN NOTE
A1c down to 5.5.   He has not wanted to continue ozempic due to concern about pancreatic impact (family history of pancreatic cancer).   Not needed with normal A1c.

## 2025-06-26 NOTE — PROGRESS NOTES
"Chief Complaint   Patient presents with    Hypogonadism    Hyperprolactinemia    Diabetes     Prediabetes    Pituitary Adenoma          HPI   Harish Anaya is a 55 y.o. male had concerns including Hypogonadism, Hyperprolactinemia, Diabetes (Prediabetes), and Pituitary Adenoma.      He stopped cabergoline in April 2025 due to headaches after discussion with PCP.   Headaches are improved since stopping the med.     A1c is down to 5.5.     Has had a bad year due to sickness, just not feeling well.     PCP referred to genetics for possible MEN screening.   Has fatty liver.   Hadn't wanted to continue ozempic due to concern about the pancreas. Has a family history of pancreatic cancer (uncle and sister).       The following portions of the patient's history were reviewed and updated as appropriate: allergies, current medications, past family history, past medical history, past social history, past surgical history, and problem list.      Review of Systems   Constitutional: Negative.    Endocrine: Negative.         Physical Exam  Vitals reviewed.   Constitutional:       Appearance: Normal appearance. He is obese.      Comments: Body mass index is 39.95 kg/m².   Cardiovascular:      Rate and Rhythm: Normal rate.   Pulmonary:      Effort: Pulmonary effort is normal.   Neurological:      General: No focal deficit present.      Mental Status: He is alert. Mental status is at baseline.   Psychiatric:         Mood and Affect: Mood normal.         Behavior: Behavior normal.        /80 (BP Location: Right arm, Patient Position: Sitting)   Pulse 57   Ht 182.9 cm (72.01\")   Wt 134 kg (294 lb 9.6 oz)   SpO2 94%   BMI 39.95 kg/m²      Labs and imaging    A1C:  Lab Results   Component Value Date    HGBA1C 5.5 06/26/2025    HGBA1C 6.00 (H) 03/31/2025      Glucose:  Lab Results   Component Value Date    POCGLU 90 06/26/2025      CMP:  Lab Results   Component Value Date    GLUCOSE 126 (H) 04/17/2025    BUN 9 " "04/17/2025    CREATININE 1.14 04/17/2025     04/17/2025    K 4.5 04/17/2025     04/17/2025    CALCIUM 9.1 04/17/2025    PROTEINTOT 6.9 04/17/2025    ALBUMIN 4.5 04/17/2025    ALT 57 (H) 04/17/2025    AST 41 (H) 04/17/2025    ALKPHOS 68 04/17/2025    BILITOT 0.6 04/17/2025    GLOB 2.4 04/17/2025    AGRATIO 1.9 04/17/2025    BCR 7.9 04/17/2025    ANIONGAP 11.6 04/17/2025    EGFR 76.9 04/17/2025      Lipid Panel:  Lab Results   Component Value Date    CHOL 239 (H) 04/17/2025    CHLPL 243 (H) 03/16/2017    TRIG 93 05/13/2025    HDL 30 (L) 04/17/2025     (H) 04/17/2025      Urine Microalbumin:  No results found for: \"MALBCRERATIO\"   TSH:  Lab Results   Component Value Date    TSH 2.560 04/17/2025        Lab Results   Component Value Date    PROLACTIN 8.41 05/13/2025    PROLACTIN 7.55 12/21/2024    PROLACTIN 5.41 06/11/2024    PROLACTIN 5.86 11/28/2023    PROLACTIN 9.08 05/31/2023    PROLACTIN 6.23 01/13/2023    PROLACTIN 9.47 05/28/2022    PROLACTIN 22.30 (H) 02/05/2022     Lab Results   Component Value Date    TESTOSTEROTT 165 (L) 09/25/2021    TESTOSTERONE 888.3 03/31/2025    TESTOSTERONE 1,070.00 (H) 12/21/2024    TESTOSTERONE 547.00 06/11/2024       Lab Results   Component Value Date    PTH 52.9 05/31/2023    CALCITONIN <2.0 02/05/2022       PROCEDURE: MRI PITUITARY W WO CONTRAST-     HISTORY: Pituitary adenoma, known or suspected; D35.2-Benign neoplasm of  pituitary gland     PROCEDURE: Multiplanar multisequence imaging of the brain was performed  both before and following the administration of 15 mL MultiHance  intravenous contrast.     COMPARISON: 8/27/2020 and 2/27/2020.     FINDINGS: There are no significant white matter abnormalities. There is  no mass, mass effect or midline shift. There is no hydrocephalus. There  are no areas of restricted diffusion. There is no pathologic contrast  enhancement.     The midbrain, yasmin, cerebellum and craniocervical junction are  unremarkable. Dynamic " imaging through the pituitary gland demonstrates  interval decrease in size of the lesion in the left aspects of the  pituitary gland which measures approximately 2 mm on today's exam. The  major intracranial vasculature demonstrates the expected flow related  signal. The paranasal sinuses are clear.     IMPRESSION:  Decrease in size of the patient's pituitary adenoma which  measures 2 mm on today's exam.        This report was finalized on 8/10/2021 3:33 PM by April Nagy M.D.       Assessment and plan  Diagnoses and all orders for this visit:    1. Prolactinoma (Primary)  Assessment & Plan:  Diagnosed 2020. Was stable on cabergoline 0.25 mg weekly with normal prolactin level. Stopped cabergoline 4/2025 after discussion with PCP due to headaches. Thankfully, headaches have improved.   Recheck level today. At risk for recurrence since stopping the med.   MRI from 8/10/2021 showed decrease in size of the pituitary adenoma, down to 2 mm.  Prolactin levels responding appropriately. Routine imaging isn't needed if prolactin levels remain stable (while being treated).   Pending referral to genetics per request to his PCP. I screened him for associated conditions for MEN 1 (due to pituitary adenoma, concern more for MEN 1, not 2) in 2023 and PTH/calcitonin was normal. Recent abd US with no noted pancreatic lesions.     Orders:  -     Prolactin    2. Prediabetes  Assessment & Plan:  A1c down to 5.5.   He has not wanted to continue ozempic due to concern about pancreatic impact (family history of pancreatic cancer).   Not needed with normal A1c.       Orders:  -     POC Glucose, Blood  -     POC Glycosylated Hemoglobin (Hb A1C)    3. Hypogonadism in male  Assessment & Plan:  On 0.7 mL every 2 weeks.  Dose reduced for high normal level on last check.  Recheck today (one day before midpoint).    Orders:  -     Testosterone (Free & Total), LC / MS         Return in about 6 months (around 12/26/2025) for next scheduled  follow up. The patient was instructed to contact the clinic with any interval questions or concerns.    Electronically signed by: Elma Thomas DO   Endocrinologist    Please note that portions of this note were completed with a voice recognition program.

## 2025-06-26 NOTE — ASSESSMENT & PLAN NOTE
Diagnosed 2020. Was stable on cabergoline 0.25 mg weekly with normal prolactin level. Stopped cabergoline 4/2025 after discussion with PCP due to headaches. Thankfully, headaches have improved.   Recheck level today. At risk for recurrence since stopping the med.   MRI from 8/10/2021 showed decrease in size of the pituitary adenoma, down to 2 mm.  Prolactin levels responding appropriately. Routine imaging isn't needed if prolactin levels remain stable (while being treated).

## 2025-06-26 NOTE — ASSESSMENT & PLAN NOTE
On 0.7 mL every 2 weeks.  Dose reduced for high normal level on last check.  Recheck today (one day before midpoint).

## 2025-06-30 DIAGNOSIS — D35.2 PROLACTINOMA: Primary | ICD-10-CM

## 2025-06-30 LAB
TESTOST FREE SERPL-MCNC: 24.1 PG/ML (ref 7.2–24)
TESTOST SERPL-MCNC: 850.1 NG/DL (ref 264–916)

## 2025-06-30 RX ORDER — CABERGOLINE 0.5 MG/1
0.25 TABLET ORAL
Qty: 8 TABLET | Refills: 0 | Status: SHIPPED | OUTPATIENT
Start: 2025-06-30 | End: 2026-06-30

## 2025-07-16 ENCOUNTER — OFFICE VISIT (OUTPATIENT)
Dept: INTERNAL MEDICINE | Facility: CLINIC | Age: 55
End: 2025-07-16
Payer: COMMERCIAL

## 2025-07-16 VITALS
DIASTOLIC BLOOD PRESSURE: 96 MMHG | OXYGEN SATURATION: 96 % | BODY MASS INDEX: 39.25 KG/M2 | WEIGHT: 289.8 LBS | TEMPERATURE: 96.9 F | SYSTOLIC BLOOD PRESSURE: 128 MMHG | HEIGHT: 72 IN | HEART RATE: 68 BPM

## 2025-07-16 DIAGNOSIS — E66.812 CLASS 2 SEVERE OBESITY DUE TO EXCESS CALORIES WITH SERIOUS COMORBIDITY AND BODY MASS INDEX (BMI) OF 39.0 TO 39.9 IN ADULT: ICD-10-CM

## 2025-07-16 DIAGNOSIS — D35.2 PROLACTINOMA: ICD-10-CM

## 2025-07-16 DIAGNOSIS — E66.01 CLASS 2 SEVERE OBESITY DUE TO EXCESS CALORIES WITH SERIOUS COMORBIDITY AND BODY MASS INDEX (BMI) OF 39.0 TO 39.9 IN ADULT: ICD-10-CM

## 2025-07-16 DIAGNOSIS — E78.2 MIXED HYPERLIPIDEMIA: ICD-10-CM

## 2025-07-16 DIAGNOSIS — R53.82 CHRONIC FATIGUE: ICD-10-CM

## 2025-07-16 DIAGNOSIS — G47.09 OTHER INSOMNIA: ICD-10-CM

## 2025-07-16 DIAGNOSIS — Z00.00 ENCOUNTER FOR PREVENTIVE HEALTH EXAMINATION: Primary | ICD-10-CM

## 2025-07-16 DIAGNOSIS — E29.1 HYPOGONADISM IN MALE: ICD-10-CM

## 2025-07-16 DIAGNOSIS — E55.9 VITAMIN D DEFICIENCY: ICD-10-CM

## 2025-07-16 PROCEDURE — 99396 PREV VISIT EST AGE 40-64: CPT | Performed by: INTERNAL MEDICINE

## 2025-07-16 RX ORDER — AMITRIPTYLINE HYDROCHLORIDE 10 MG/1
10 TABLET ORAL NIGHTLY PRN
Qty: 90 TABLET | Refills: 3 | Status: SHIPPED | OUTPATIENT
Start: 2025-07-16

## 2025-07-16 NOTE — PROGRESS NOTES
Chief Complaint   Patient presents with    Annual Exam       Subjective     History of Present Illness  The patient is a 55-year-old male presenting for an annual physical.    He reports no significant changes since his last visit. He is not currently on any daily medications. He has been making efforts to improve his diet and increase his physical activity. He has been struggling to take his cholesterol medication daily but is committed to improving this. He is also taking vitamin D supplements nightly. He reports no shortness of breath or chest pain.    He attempted to discontinue cabergoline, which resulted in a rapid increase in his prolactin levels. His endocrinologist agreed to reduce the dosage, which he tolerated well. He felt better after discontinuing the medication. His current regimen includes cabergoline every other week at a low dose, with plans to retest his prolactin levels in 3 months.    He has been referred to a gastroenterologist for fatty liver disease. He is considering Ozempic injections if other treatments prove ineffective.    Social History:  Diet: He is making efforts to improve his diet.    The following portions of the patient's history were reviewed and updated as appropriate: allergies, current medications, past family history, past medical history, past social history, past surgical history and problem list.    Review of Systems   Constitutional:  Negative for chills, fatigue and fever.   HENT:  Negative for congestion, ear pain, rhinorrhea, sinus pressure and sore throat.    Eyes:  Negative for visual disturbance.   Respiratory:  Negative for cough, chest tightness, shortness of breath and wheezing.    Cardiovascular:  Negative for chest pain, palpitations and leg swelling.   Gastrointestinal:  Negative for abdominal pain, blood in stool, constipation, diarrhea, nausea and vomiting.   Endocrine: Negative for polydipsia and polyuria.   Genitourinary:  Negative for dysuria and  hematuria.   Musculoskeletal:  Negative for arthralgias and back pain.   Skin:  Negative for rash.   Neurological:  Negative for dizziness, light-headedness, numbness and headaches.   Psychiatric/Behavioral:  Negative for dysphoric mood and sleep disturbance. The patient is not nervous/anxious.        No Known Allergies    Past Medical History:   Diagnosis Date    Allergic rhinitis     BMI 38.0-38.9,adult     Diabetes mellitus     borderline    Diverticulosis of colon without hemorrhage     Dizziness     Heartburn     Hyperlipidemia     Hypopituitarism     Malaise and fatigue     Pituitary adenoma     Polyuria     PONV (postoperative nausea and vomiting)     Sore throat     Testosterone deficiency     Vegetarian diet     since 3 wk ago    Vitamin D deficiency 10/15/21       Social History     Socioeconomic History    Marital status:    Tobacco Use    Smoking status: Former     Current packs/day: 0.00     Average packs/day: 0.5 packs/day for 2.0 years (1.0 ttl pk-yrs)     Types: Cigarettes     Start date: 1988     Quit date: 1990     Years since quittin.5     Passive exposure: Never    Smokeless tobacco: Never    Tobacco comments:     Was never a heavy smoker   Vaping Use    Vaping status: Never Used   Substance and Sexual Activity    Alcohol use: No    Drug use: No    Sexual activity: Defer        Past Surgical History:   Procedure Laterality Date    APPENDECTOMY      CHOLECYSTECTOMY      COLONOSCOPY      complete     COLONOSCOPY N/A 2020    Procedure: COLONOSCOPY;  Surgeon: Ruddy Lara MD;  Location: [facility] ENDOSCOPY;  Service: Gastroenterology    ELBOW PROCEDURE         Family History   Problem Relation Age of Onset    Depression Mother     Cancer Maternal Grandmother     Melanoma Maternal Grandmother     Parkinsonism Maternal Grandfather     Diabetes Maternal Aunt     Alcohol abuse Maternal Uncle     Liver disease Maternal Uncle     Colon cancer Neg Hx     Cirrhosis Neg Hx     Liver  "cancer Neg Hx          Current Outpatient Medications:     amitriptyline (ELAVIL) 10 MG tablet, Take 1 tablet by mouth At Night As Needed for Sleep., Disp: 90 tablet, Rfl: 3    cabergoline (DOSTINEX) 0.5 MG tablet, Take 1/2 tablets (0.25 mg) by mouth Every 14 (Fourteen) Days., Disp: 8 tablet, Rfl: 0    cholecalciferol (VITAMIN D3) 25 MCG (1000 UT) tablet, Take 1 tablet by mouth Daily., Disp: , Rfl:     Needle, Disp, (Hypodermic Needle) 22G X 1\" misc, Use for testosterone injection, Disp: 6 each, Rfl: 3    Needle, Disp, (MONOJECT HYPO 18GX1\") 18G X 1\" misc, Use for testosterone injection, Disp: 6 each, Rfl: 3    rosuvastatin (Crestor) 5 MG tablet, Take 1 tablet by mouth Daily., Disp: 90 tablet, Rfl: 3    Testosterone Cypionate (DEPOTESTOTERONE CYPIONATE) 200 MG/ML injection, Inject 0.6 mL into the appropriate muscle as directed by prescriber Every 14 (Fourteen) Days. Discard remainder of vial after each dose., Disp: 13 mL, Rfl: 1    Objective   /96   Pulse 68   Temp 96.9 °F (36.1 °C) (Temporal)   Ht 182.9 cm (72.01\")   Wt 131 kg (289 lb 12.8 oz)   SpO2 96%   BMI 39.30 kg/m²     Physical Exam  Vitals and nursing note reviewed.   Constitutional:       Appearance: Normal appearance. He is well-developed. He is obese.   HENT:      Head: Normocephalic and atraumatic.      Right Ear: Tympanic membrane and external ear normal.      Left Ear: Tympanic membrane and external ear normal.      Nose: Nose normal. No congestion.      Mouth/Throat:      Mouth: Mucous membranes are moist.      Pharynx: No oropharyngeal exudate.   Eyes:      General: No scleral icterus.        Right eye: No discharge.      Pupils: Pupils are equal, round, and reactive to light.   Neck:      Thyroid: No thyromegaly.      Vascular: No JVD.   Cardiovascular:      Rate and Rhythm: Normal rate and regular rhythm.      Heart sounds: Normal heart sounds. No murmur heard.     No friction rub.   Pulmonary:      Effort: Pulmonary effort is normal. " No respiratory distress.      Breath sounds: Normal breath sounds. No stridor. No wheezing.   Abdominal:      General: Bowel sounds are normal. There is no distension.      Palpations: Abdomen is soft.      Tenderness: There is no abdominal tenderness. There is no guarding.   Genitourinary:     Comments: Deferred  Musculoskeletal:         General: No tenderness. Normal range of motion.      Cervical back: Normal range of motion and neck supple.   Lymphadenopathy:      Cervical: No cervical adenopathy.   Skin:     General: Skin is warm and dry.      Findings: No rash.   Neurological:      Mental Status: He is alert and oriented to person, place, and time.      Cranial Nerves: No cranial nerve deficit.   Psychiatric:         Mood and Affect: Mood normal.         Behavior: Behavior normal.         Thought Content: Thought content normal.           Results for orders placed or performed in visit on 06/26/25   POC Glucose, Blood    Collection Time: 06/26/25  9:26 AM    Specimen: Blood   Result Value Ref Range    Glucose 90 70 - 130 mg/dL    Lot Number [id number]     Expiration Date 01/14/2026    POC Glycosylated Hemoglobin (Hb A1C)    Collection Time: 06/26/25  9:28 AM    Specimen: Blood   Result Value Ref Range    Hemoglobin A1C 5.5 4.5 - 5.7 %    Lot Number [id number]     Expiration Date 03/14/2027    Prolactin    Collection Time: 06/26/25 10:01 AM    Specimen: Blood   Result Value Ref Range    Prolactin 27.70 (H) 4.04 - 15.20 ng/mL   Testosterone (Free & Total), LC / MS    Collection Time: 06/26/25 10:01 AM    Specimen: Arm, Left; Blood   Result Value Ref Range    Testosterone, Total 850.1 264.0 - 916.0 ng/dL    Testosterone, Free 24.1 (H) 7.2 - 24.0 pg/mL       Assessment & Plan   Diagnoses and all orders for this visit:    1. Encounter for preventive health examination (Primary)  -     CBC & Differential  -     Comprehensive Metabolic Panel  -     Lipid Panel  -     Hemoglobin A1c  -     Vitamin D,25-Hydroxy    2.  Prolactinoma    3. Mixed hyperlipidemia  -     CBC & Differential  -     Comprehensive Metabolic Panel  -     Lipid Panel  -     Hemoglobin A1c    4. Chronic fatigue  -     CBC & Differential  -     Comprehensive Metabolic Panel  -     Lipid Panel  -     Hemoglobin A1c  -     Vitamin D,25-Hydroxy    5. Hypogonadism in male    6. Class 2 severe obesity due to excess calories with serious comorbidity and body mass index (BMI) of 39.0 to 39.9 in adult    7. Vitamin D deficiency  -     Vitamin D,25-Hydroxy    8. Other insomnia  -     amitriptyline (ELAVIL) 10 MG tablet; Take 1 tablet by mouth At Night As Needed for Sleep.  Dispense: 90 tablet; Refill: 3        Discussion Summary:  Patient is a 55 y.o. male presenting for annual physical    Preventive Health Maintenance  - Baseline labs are up-to-date or ordered per above.  - Vaccines reviewed and updated  - Preventive health measures were discussed including: healthy diet with increase in fruits and vegetables, regular exercise at least 3 times a week, safe sex practices, avoidance of drugs, tobacco, and alcohol, and regular seatbelt use.    Assessment & Plan  Health maintenance.  - Weight has decreased slightly, and blood pressure readings are within the normal range.  - Advised to maintain a consistent routine for taking cholesterol medication, preferably at night with a small amount of water.  - Recheck of vitamin D levels will be conducted.  - Encouraged to receive the pneumonia vaccine and shingles vaccine series at the pharmacy. Routine blood tests have been ordered.    Prolactinoma  - Prolactin levels increased after discontinuing cabergoline.  - Currently on a reduced dose of cabergoline, taken every other week.  - Prolactin levels will be retested in 3 months to monitor the effectiveness of the adjusted dosage.  - Endocrinology is managing this condition.    Fatty liver disease.  - Referred to a gastroenterologist for further management.  - Considering medical  options like Ozempic for weight loss and fatty liver management.  - Potential benefits and side effects of these medications were discussed.  - Monitoring diet and exercise as part of management.             Follow up:  Return in about 1 year (around 7/16/2026) for Annual physical.     Patient Instructions:  Patient instructions were provided.    Patient or patient representative verbalized consent for the use of Ambient Listening during the visit with  Matti Rivera DO for chart documentation. 7/16/2025  11:12 EDT

## 2025-08-11 DIAGNOSIS — Z86.0101 HISTORY OF ADENOMATOUS POLYP OF COLON: Primary | ICD-10-CM

## 2025-08-11 RX ORDER — SODIUM, POTASSIUM,MAG SULFATES 17.5-3.13G
SOLUTION, RECONSTITUTED, ORAL ORAL
Qty: 354 ML | Refills: 0 | Status: SHIPPED | OUTPATIENT
Start: 2025-08-11 | End: 2025-08-15 | Stop reason: HOSPADM

## 2025-08-15 ENCOUNTER — ANESTHESIA EVENT (OUTPATIENT)
Dept: GASTROENTEROLOGY | Facility: HOSPITAL | Age: 55
End: 2025-08-15
Payer: COMMERCIAL

## 2025-08-15 ENCOUNTER — ANESTHESIA (OUTPATIENT)
Dept: GASTROENTEROLOGY | Facility: HOSPITAL | Age: 55
End: 2025-08-15
Payer: COMMERCIAL

## 2025-08-15 ENCOUNTER — HOSPITAL ENCOUNTER (OUTPATIENT)
Facility: HOSPITAL | Age: 55
Setting detail: HOSPITAL OUTPATIENT SURGERY
Discharge: HOME | End: 2025-08-15
Attending: INTERNAL MEDICINE | Admitting: INTERNAL MEDICINE
Payer: COMMERCIAL

## 2025-08-15 VITALS
SYSTOLIC BLOOD PRESSURE: 134 MMHG | HEART RATE: 75 BPM | TEMPERATURE: 97.4 F | RESPIRATION RATE: 16 BRPM | OXYGEN SATURATION: 94 % | DIASTOLIC BLOOD PRESSURE: 80 MMHG

## 2025-08-15 DIAGNOSIS — Z86.0101 HISTORY OF ADENOMATOUS POLYP OF COLON: ICD-10-CM

## 2025-08-15 PROCEDURE — 25010000002 PROPOFOL 200 MG/20ML EMULSION: Performed by: NURSE ANESTHETIST, CERTIFIED REGISTERED

## 2025-08-15 PROCEDURE — 45385 COLONOSCOPY W/LESION REMOVAL: CPT | Performed by: INTERNAL MEDICINE

## 2025-08-15 PROCEDURE — 25810000003 SODIUM CHLORIDE 0.9 % SOLUTION: Performed by: NURSE ANESTHETIST, CERTIFIED REGISTERED

## 2025-08-15 PROCEDURE — 25010000002 LIDOCAINE HCL (CARDIAC) 100 MG/5ML SOLUTION PREFILLED SYRINGE: Performed by: NURSE ANESTHETIST, CERTIFIED REGISTERED

## 2025-08-15 PROCEDURE — 25010000002 PROPOFOL 10 MG/ML EMULSION: Performed by: NURSE ANESTHETIST, CERTIFIED REGISTERED

## 2025-08-15 RX ORDER — SIMETHICONE 40MG/0.6ML
SUSPENSION, DROPS(FINAL DOSAGE FORM)(ML) ORAL AS NEEDED
Status: DISCONTINUED | OUTPATIENT
Start: 2025-08-15 | End: 2025-08-15 | Stop reason: HOSPADM

## 2025-08-15 RX ORDER — SODIUM CHLORIDE 9 MG/ML
INJECTION, SOLUTION INTRAVENOUS CONTINUOUS PRN
Status: DISCONTINUED | OUTPATIENT
Start: 2025-08-15 | End: 2025-08-15 | Stop reason: SURG

## 2025-08-15 RX ORDER — LIDOCAINE HCL/PF 100 MG/5ML
SYRINGE (ML) INJECTION AS NEEDED
Status: DISCONTINUED | OUTPATIENT
Start: 2025-08-15 | End: 2025-08-15 | Stop reason: SURG

## 2025-08-15 RX ORDER — PROPOFOL 10 MG/ML
INJECTION, EMULSION INTRAVENOUS AS NEEDED
Status: DISCONTINUED | OUTPATIENT
Start: 2025-08-15 | End: 2025-08-15 | Stop reason: SURG

## 2025-08-15 RX ORDER — SODIUM CHLORIDE 9 MG/ML
30 INJECTION, SOLUTION INTRAVENOUS CONTINUOUS PRN
Status: DISCONTINUED | OUTPATIENT
Start: 2025-08-15 | End: 2025-08-15 | Stop reason: HOSPADM

## 2025-08-15 RX ADMIN — Medication 40 MG: at 10:52

## 2025-08-15 RX ADMIN — SODIUM CHLORIDE: 9 INJECTION, SOLUTION INTRAVENOUS at 10:42

## 2025-08-15 RX ADMIN — PROPOFOL 140 MCG/KG/MIN: 10 INJECTION, EMULSION INTRAVENOUS at 10:52

## 2025-08-15 RX ADMIN — PROPOFOL 100 MG: 10 INJECTION, EMULSION INTRAVENOUS at 10:52

## 2025-08-18 LAB — REF LAB TEST METHOD: NORMAL

## (undated) DEVICE — Device

## (undated) DEVICE — LUBE JELLY PK/2.75GM STRL BX/144

## (undated) DEVICE — CANISTER, RIGID, 2000CC: Brand: MEDLINE INDUSTRIES, INC.

## (undated) DEVICE — SNAR POLYP SENSATION STDOVL 27 240 BX40

## (undated) DEVICE — SYR LUER SLPTP 50ML

## (undated) DEVICE — QUICK CATCH IN-LINE SUCTION POLYP TRAP IS USED FOR SUCTION RETRIEVAL OF ENDOSCOPICALLY REMOVED POLYPS.

## (undated) DEVICE — HYBRID TUBING/CAP SET FOR OLYMPUS® SCOPES: Brand: ERBE

## (undated) DEVICE — PAD GRND REM POLYHESIVE A/ DISP

## (undated) DEVICE — VLV SXN AIR/H2O ORCAPOD3 1P/U STRL

## (undated) DEVICE — SINGLE-USE POLYPECTOMY SNARE: Brand: CAPTIVATOR II

## (undated) DEVICE — ENDOSCOPY PORT CONNECTOR FOR OLYMPUS® SCOPES: Brand: ERBE

## (undated) DEVICE — FRCP BIOP COLD ENDOJAW ALLGTR W/NDL 2.8X2300MM BLU

## (undated) DEVICE — TRAP,MUCUS SPECIMEN,40CC: Brand: MEDLINE

## (undated) DEVICE — HYBRID CO2 TUBING/CAP SET FOR OLYMPUS® SCOPES & CO2 SOURCE: Brand: ERBE

## (undated) DEVICE — Device: Brand: DEFENDO AIR/WATER/SUCTION AND BIOPSY VALVE